# Patient Record
Sex: FEMALE | Race: WHITE | NOT HISPANIC OR LATINO | Employment: OTHER | URBAN - METROPOLITAN AREA
[De-identification: names, ages, dates, MRNs, and addresses within clinical notes are randomized per-mention and may not be internally consistent; named-entity substitution may affect disease eponyms.]

---

## 2018-06-22 ENCOUNTER — HOSPITAL ENCOUNTER (EMERGENCY)
Facility: CLINIC | Age: 83
Discharge: HOME | End: 2018-06-22
Attending: EMERGENCY MEDICINE | Admitting: EMERGENCY MEDICINE
Payer: COMMERCIAL

## 2018-06-22 VITALS
HEIGHT: 65 IN | BODY MASS INDEX: 30.66 KG/M2 | OXYGEN SATURATION: 96 % | WEIGHT: 184 LBS | HEART RATE: 84 BPM | SYSTOLIC BLOOD PRESSURE: 136 MMHG | DIASTOLIC BLOOD PRESSURE: 83 MMHG | RESPIRATION RATE: 16 BRPM | TEMPERATURE: 97.6 F

## 2018-06-22 DIAGNOSIS — S81.001A OPEN KNEE WOUND, RIGHT, INITIAL ENCOUNTER: ICD-10-CM

## 2018-06-22 LAB
BASOPHILS # BLD AUTO: 0.1 10E9/L (ref 0–0.2)
BASOPHILS NFR BLD AUTO: 0.5 %
DIFFERENTIAL METHOD BLD: NORMAL
EOSINOPHIL # BLD AUTO: 0.3 10E9/L (ref 0–0.7)
EOSINOPHIL NFR BLD AUTO: 3 %
ERYTHROCYTE [DISTWIDTH] IN BLOOD BY AUTOMATED COUNT: 14 % (ref 10–15)
HCT VFR BLD AUTO: 40.4 % (ref 35–47)
HGB BLD-MCNC: 13.2 G/DL (ref 11.7–15.7)
IMM GRANULOCYTES # BLD: 0 10E9/L (ref 0–0.4)
IMM GRANULOCYTES NFR BLD: 0.4 %
LYMPHOCYTES # BLD AUTO: 1.6 10E9/L (ref 0.8–5.3)
LYMPHOCYTES NFR BLD AUTO: 17.2 %
MCH RBC QN AUTO: 29.6 PG (ref 26.5–33)
MCHC RBC AUTO-ENTMCNC: 32.7 G/DL (ref 31.5–36.5)
MCV RBC AUTO: 91 FL (ref 78–100)
MONOCYTES # BLD AUTO: 0.5 10E9/L (ref 0–1.3)
MONOCYTES NFR BLD AUTO: 5.8 %
NEUTROPHILS # BLD AUTO: 6.8 10E9/L (ref 1.6–8.3)
NEUTROPHILS NFR BLD AUTO: 73.1 %
NRBC # BLD AUTO: 0 10*3/UL
NRBC BLD AUTO-RTO: 0 /100
PLATELET # BLD AUTO: 274 10E9/L (ref 150–450)
RBC # BLD AUTO: 4.46 10E12/L (ref 3.8–5.2)
WBC # BLD AUTO: 9.4 10E9/L (ref 4–11)

## 2018-06-22 PROCEDURE — 85025 COMPLETE CBC W/AUTO DIFF WBC: CPT | Performed by: EMERGENCY MEDICINE

## 2018-06-22 PROCEDURE — 99283 EMERGENCY DEPT VISIT LOW MDM: CPT | Mod: 25

## 2018-06-22 PROCEDURE — 11042 DBRDMT SUBQ TIS 1ST 20SQCM/<: CPT

## 2018-06-22 ASSESSMENT — ENCOUNTER SYMPTOMS
WOUND: 1
FEVER: 0
MYALGIAS: 0
NUMBNESS: 0
FATIGUE: 1
CHILLS: 0
JOINT SWELLING: 1

## 2018-06-22 NOTE — ED PROVIDER NOTES
History     Chief Complaint:  Wound Check     HPI   Floridalma Cunningham is a 83 year old female, with a history of hypertension, who presents with her daughter to the ED for evaluation of right knee wound check. The patient reports she fell onto vinyl floors while wearing [place] jeans 9 days ago. She was initially evaluated and had a x-ray done. The wound was bandaged. The patient notes she was advised to visit her PCP, and she went 2-3 times with the most recent visit yesterday. She was switched to Clindamycin 150mg TID. She was advised to be reevaluated for worsening redness and swelling, prompting her visit to the ED today. The patient report she has felt more fatigued and is sleeping more. She normally ambulates with a walker. The patient denies any fever, chills, calf pain, or new numbness/tingling. She otherwise feels well and is able to ambulate on the affected leg. No significant pain on movement of the right knee.     Right XR Knee 3 Views, 6/13/2018  FINDINGS/IMPRESSION:  1.  No fracture or malalignment.  2.  Moderate degenerative changes of the medial and lateral compartments as well as the patellofemoral joint. There may be a small joint effusion.  Ty Ulloa MD      Allergies:  Sulfa drugs      Medications:    Atorvastatin  Hydrochlorothiazide   Lisinopril      Past Medical History:    HTN    Past Surgical History:    History reviewed. No pertinent surgical history.    Family History:    History reviewed. No pertinent family history.     Social History:  Smoking status: Never smoker   Alcohol use: No  Presents to ED with daughter    Marital Status:   [5]     Review of Systems   Constitutional: Positive for fatigue. Negative for chills and fever.   Musculoskeletal: Positive for joint swelling. Negative for myalgias.   Skin: Positive for rash and wound.   Neurological: Negative for numbness.   All other systems reviewed and are negative.    Physical Exam     Patient Vitals for the past 24  "hrs:   BP Temp Temp src Pulse Resp SpO2 Height Weight   06/22/18 1711 - - - - - 96 % - -   06/22/18 1706 136/83 - - - - 96 % - -   06/22/18 1422 148/83 97.6  F (36.4  C) Temporal 84 16 95 % 1.651 m (5' 5\") 83.5 kg (184 lb)     Physical Exam  General: Well appearing, nontoxic. Resting comfortably  Head:  Scalp, face, and head appear normal  Eyes:  Pupils are equal, round, and reactive to light    Conjunctivae non-injected and sclerae white  ENT:    The external nose is normal    Pinnae are normal    The oropharynx is normal, mucous membranes moist    Uvula is in the midline  Neck:  Normal range of motion    There is no rigidity noted    Trachea is in the midline  CV:  Regular rate and rhythm     Normal S1/S2, no S3/S4    No murmur or rub  Resp:  Lungs are clear and equal bilaterally    There is no tachypnea    No increased work of breathing    No rales, wheezing, or rhonchi  GI:  Abdomen is soft, no rigidity or guarding    No distension    No tenderness or rebound tenderness   MS:  Normal muscular tone. Ecchymosis left medial knee    Right knee with large 7x5 open soft tissue avulsion wound with central hematoma. The lateral aspect of the wound covered by devitalized superficial skin flap with a small area of trapped serous fluid at the inferior aspect. No active bleeding. Minimal surrounding erythema. No induration or significant warmth.     Symmetric motor strength    No lower extremity edema  Skin:  Wound as noted above  Neuro:  Awake and alert    Speech is normal and fluent    Moves all extremities spontaneously  Psych: Normal affect.  Appropriate interactions.             Emergency Department Course     Laboratory:  CBC: o/w WNL (WBC 9.4, HGB 13.2, )    Procedures:     Wound Care     PERFORMED BY: Marco Trejo MD    LOCATION: Right knee     FUNCTION:  Distally sensation, circulation, motor, and tendon are intact.    ANESTHESIA: None    NOTE: the devitalized superficial tissue/skin flap that was " predominantly overlying the lateral aspect of the wound was debrided with blunt and sharp dissection. Patient tolerated the procedure well with no immediate complications.    Emergency Department Course:  Past medical records, nursing notes, and vitals reviewed.  1603: I performed an exam of the patient and obtained history, as documented above.    1632: I performed the wound care as noted above.    1734: I rechecked the patient.    Findings and plan explained to the Patient and daughter. Patient discharged home with instructions regarding supportive care, medications, and reasons to return. The importance of close follow-up was reviewed.     Impression & Plan      Medical Decision Making:  Floridalma Cunningham is a 83 year old female who presents for evaluation of an open wound on the right knee that she sustained after falling. This is consistent with soft tissue avulsion wound. Patient started on clindamycin 150mg TID by her PCP. There is mild surrounding erythema but no evidence to suggest significant soft tissue infection, cellulitis, or necrotizing infection. There was devitalized tissue overlying a portion of the wound which was debrided as noted above. Patient was full active and passive ROM of the right knee without significant pain making septic arthritis very unlikely. In addition patient as no systemic signs or symptoms of infection including fever, malaise, body aches or other symptoms. CBC including WBC entirely within normal limits. Based on appearance and risk for poor healing, will have them see wound specialist for reevaluation. Mepilex dressing with gauze and kerlix applied to the wound. Dressing supplies given for home.  Patient is already prescribed Clindamycin by her primary care physician and should continue this.  No signs of lymphadenitis, lymphangitis, necrotizing fascitis, osteomyelitis, abscess, cellulitis, etc. The area of erythema was outlined with skin marker. Very close return precautions  provided including worsening erythema, fever, malaise, joint pain or any other worsening of her condition which should prompt immediate return to the ED. Follow up at the wound clinic and PCP recommended. Patient discharged in stable condition.     Diagnosis:    ICD-10-CM   1. Open knee wound, right, initial encounter S81.001A     Disposition: Patient discharged to home with daughter     Trang Souza  6/22/2018    EMERGENCY DEPARTMENT    Scribe Disclosure:  I, Trang Souza, am serving as a scribe at 4:03 PM on 6/22/2018 to document services personally performed by Marco Trejo MD based on my observations and the provider's statements to me.        Marco Trejo MD  06/23/18 0103

## 2018-06-22 NOTE — DISCHARGE INSTRUCTIONS
Keep the dressing on for 2-3 days before changing it. Change it sooner if it comes soiled or soaked through. Follow up with the [facility]. Call today to schedule an appointment. Continue taking the Clindamycin as prescribed. Return immediately for any worsening fevers, spreading redness, lethargy, malaise, or worsening of your condition.    Skin Tear (Skin Avulsion)  A skin avulsion is a tearing of the top layer of skin. This commonly happens after a fall or other injury. It also tends to be more common in older people, or those taking blood thinners or steroids for long periods of time.  Home care  These guidelines will help you care for your wound at home:    Keep the wound clean and dry for the first 24 to 48 hours, or as your healthcare provider advises.    If there is a dressing or bandage, change it when it gets wet or dirty. Otherwise, leave it on for the first 24 hours, then change it once a day or as often as the doctor says.    If stitches or staples were used, check the wound every day.    After taking off the dressing, wash the area gently with soap and water. Clean as close to the stitches as you can. Avoid washing or rubbing the stitches directly.    After 3 days you can keep the bandages off the wound, unless told otherwise, or there is continued drainage.  Allow the wound to be open to the air.    Keep a thin layer of antibiotic ointment on the cut. This will keep the wound clean, make it easier to remove the stitches, and reduce scarring.    If your wound is oozing, you can put a nonstick dressing over it. Then, reapply the bandage or dressing as you were told.    You can shower as usual after the first 24 hours, but don't soak the area in water (no baths or swimming) until the stitches or staples are taken out.    If surgical tape was used, keep the area clean and dry. If it becomes wet, blot it dry with a clean towel.    If skin glue was used, don't put any creams, lotions, or  antibiotic ointments on it. These can dissolve the glue. Usually the glue will flake off in about 5 to 10 days by itself. Try to resist picking it off before that so the wound doesn't open up. When it gets wet, pat it dry.  Here is some information about medicine:    You may use over-the-counter medicine such as acetaminophen or ibuprofen to control pain, unless another pain medicine was given. If you have chronic liver or kidney disease or ever had a stomach ulcer or gastrointestinal bleeding, talk with your doctor before using these medicines.    If you were given antibiotics, take them until they are all used up. It is important to finish the antibiotics even if the wound looks better. This will ensure that the infection has cleared.  Follow-up care  Follow up with your healthcare provider, or as advised.    Watch for any signs of infection, such as increasing redness, swelling, or pus coming out. If this happens, don't wait for your scheduled visit. Instead, see a doctor sooner.    Stitches or staples are usually taken out within 5 to 14 days. This varies depending on what part of your body they are on, and the type of wound. The doctor will tell you how long stitches should be left in.     If surgical tape was used, it is usually left on for 7 to 10 days. You can remove surgical tape after that unless you were told otherwise. If you try to remove it, and it is too hard, soaking can help. Surgical tape strips will eventually fall off on their own. If the edges of the cut pull apart, stop removing the tape or strips and follow up with your doctor    As mentioned above, skin glue will flake off by itself in 5 to 10 days, so you don't need to pull it off.  If any X-rays were done, you will be notified of any changes that may affect your care.  When to seek medical advice  Call your healthcare provider right away if any of these occur:    Increasing pain in the wound    Redness, swelling, or pus coming from the  wound    Fever of 100.4 F (38 C) or higher, or as directed by your healthcare provider    Sutures or staples come apart or fall out before your next appointment and the wound edges look as if they will re-open    Surgical tape closures fall off before 7 days, and the wound edges look as if they will re-open    Bleeding not controlled by direct pressure  Date Last Reviewed: 9/1/2016 2000-2017 [employer], [employer]. [address] [place] [address], [place] [address]. All rights reserved. This information is not intended as a substitute for professional medical care. Always follow your healthcare professional's instructions.

## 2018-06-22 NOTE — ED AVS SNAPSHOT
Emergency Department    [address] [place] [id number]    Phone:  [phone]    Fax:  [phone]                                       Floridalma Cunningham   MRN: [id number]    Department:   Emergency Department   Date of Visit:  6/22/2018           After Visit Summary Signature Page     I have received my discharge instructions, and my questions have been answered. I have discussed any challenges I see with this plan with the nurse or doctor.    ..........................................................................................................................................  Patient/Patient Representative Signature      ..........................................................................................................................................  Patient Representative Print Name and Relationship to Patient    ..................................................               ................................................  Date                                            Time    ..........................................................................................................................................  Reviewed by Signature/Title    ...................................................              ..............................................  Date                                                            Time

## 2018-06-22 NOTE — ED AVS SNAPSHOT
Emergency Department    [address] [facility] [address]    Phone:  [phone]    Fax:  [phone]                                       Floridalma Cunningham   MRN: [id number]    Department:   Emergency Department   Date of Visit:  6/22/2018           Patient Information     Date Of Birth          1935        Your diagnoses for this visit were:     Open knee wound, right, initial encounter        You were seen by Marco Trejo MD.      Follow-up Information     Follow up with [facility]. Schedule an appointment as soon as possible for a visit in 2 days.    Why:  For close follow up of your wound    Contact information:    [facility]  [address], [address]  [place] [address] [phone]        Go to  Emergency Department.    Specialty:  EMERGENCY MEDICINE    Why:  If symptoms worsen    Contact information:    [address] [facility] [address] [phone]        Follow up with Naz Peacock MD. Schedule an appointment as soon as possible for a visit in 3 days.    Why:  For wound re-check    Contact information:    [employer]  [address] [facility] [id number] [phone]          Discharge Instructions         Keep the dressing on for 2-3 days before changing it. Change it sooner if it comes soiled or soaked through. Follow up with the [facility]. Call today to schedule an appointment. Continue taking the Clindamycin as prescribed. Return immediately for any worsening fevers, spreading redness, lethargy, malaise, or worsening of your condition.    Skin Tear (Skin Avulsion)  A skin avulsion is a tearing of the top layer of skin. This commonly happens after a fall or other injury. It also tends to be more common in older people, or those taking blood thinners or steroids for long periods of time.  Home care  These guidelines will help you care for your wound at home:    Keep the wound clean and dry  for the first 24 to 48 hours, or as your healthcare provider advises.    If there is a dressing or bandage, change it when it gets wet or dirty. Otherwise, leave it on for the first 24 hours, then change it once a day or as often as the doctor says.    If stitches or staples were used, check the wound every day.    After taking off the dressing, wash the area gently with soap and water. Clean as close to the stitches as you can. Avoid washing or rubbing the stitches directly.    After 3 days you can keep the bandages off the wound, unless told otherwise, or there is continued drainage.  Allow the wound to be open to the air.    Keep a thin layer of antibiotic ointment on the cut. This will keep the wound clean, make it easier to remove the stitches, and reduce scarring.    If your wound is oozing, you can put a nonstick dressing over it. Then, reapply the bandage or dressing as you were told.    You can shower as usual after the first 24 hours, but don't soak the area in water (no baths or swimming) until the stitches or staples are taken out.    If surgical tape was used, keep the area clean and dry. If it becomes wet, blot it dry with a clean towel.    If skin glue was used, don't put any creams, lotions, or antibiotic ointments on it. These can dissolve the glue. Usually the glue will flake off in about 5 to 10 days by itself. Try to resist picking it off before that so the wound doesn't open up. When it gets wet, pat it dry.  Here is some information about medicine:    You may use over-the-counter medicine such as acetaminophen or ibuprofen to control pain, unless another pain medicine was given. If you have chronic liver or kidney disease or ever had a stomach ulcer or gastrointestinal bleeding, talk with your doctor before using these medicines.    If you were given antibiotics, take them until they are all used up. It is important to finish the antibiotics even if the wound looks better. This will ensure that  the infection has cleared.  Follow-up care  Follow up with your healthcare provider, or as advised.    Watch for any signs of infection, such as increasing redness, swelling, or pus coming out. If this happens, don't wait for your scheduled visit. Instead, see a doctor sooner.    Stitches or staples are usually taken out within 5 to 14 days. This varies depending on what part of your body they are on, and the type of wound. The doctor will tell you how long stitches should be left in.     If surgical tape was used, it is usually left on for 7 to 10 days. You can remove surgical tape after that unless you were told otherwise. If you try to remove it, and it is too hard, soaking can help. Surgical tape strips will eventually fall off on their own. If the edges of the cut pull apart, stop removing the tape or strips and follow up with your doctor    As mentioned above, skin glue will flake off by itself in 5 to 10 days, so you don't need to pull it off.  If any X-rays were done, you will be notified of any changes that may affect your care.  When to seek medical advice  Call your healthcare provider right away if any of these occur:    Increasing pain in the wound    Redness, swelling, or pus coming from the wound    Fever of 100.4 F (38 C) or higher, or as directed by your healthcare provider    Sutures or staples come apart or fall out before your next appointment and the wound edges look as if they will re-open    Surgical tape closures fall off before 7 days, and the wound edges look as if they will re-open    Bleeding not controlled by direct pressure  Date Last Reviewed: 9/1/2016 2000-2017 [employer]. [address] [place] [address]. All rights reserved. This information is not intended as a substitute for professional medical care. Always follow your healthcare professional's instructions.          24 Hour Appointment Hotline       To make an appointment at any [facility], call  [phone] ([phone]). If you don't have a family doctor or clinic, we will help you find one. [facility] clinics are conveniently located to serve the needs of you and your family.             Review of your medicines      Our records show that you are taking the medicines listed below. If these are incorrect, please call your family doctor or clinic.        Dose / Directions Last dose taken    ATORVASTATIN CALCIUM PO        Refills:  0        HYDROCHLOROTHIAZIDE PO        Refills:  0        LISINOPRIL PO        Refills:  0                Procedures and tests performed during your visit     CBC with platelets differential      Orders Needing Specimen Collection     None      Pending Results     No orders found from 6/20/2018 to 6/23/2018.            Pending Culture Results     No orders found from 6/20/2018 to 6/23/2018.            Pending Results Instructions     If you had any lab results that were not finalized at the time of your Discharge, you can call the ED Lab Result RN at [phone]. You will be contacted by this team for any positive Lab results or changes in treatment. The nurses are available 7 days a week from 10A to 6:30P.  You can leave a message 24 hours per day and they will return your call.        Test Results From Your Hospital Stay        6/22/2018  5:04 PM      Component Results     Component Value Ref Range & Units Status    WBC 9.4 4.0 - 11.0 10e9/L Final    RBC Count 4.46 3.8 - 5.2 10e12/L Final    Hemoglobin 13.2 11.7 - 15.7 g/dL Final    Hematocrit 40.4 35.0 - 47.0 % Final    MCV 91 78 - 100 fl Final    MCH 29.6 26.5 - 33.0 pg Final    MCHC 32.7 31.5 - 36.5 g/dL Final    RDW 14.0 10.0 - 15.0 % Final    Platelet Count 274 150 - 450 10e9/L Final    Diff Method Automated Method  Final    % Neutrophils 73.1 % Final    % Lymphocytes 17.2 % Final    % Monocytes 5.8 % Final    % Eosinophils 3.0 % Final    % Basophils 0.5 % Final    % Immature Granulocytes 0.4 % Final    Nucleated RBCs  0 0 /100 Final    Absolute Neutrophil 6.8 1.6 - 8.3 10e9/L Final    Absolute Lymphocytes 1.6 0.8 - 5.3 10e9/L Final    Absolute Monocytes 0.5 0.0 - 1.3 10e9/L Final    Absolute Eosinophils 0.3 0.0 - 0.7 10e9/L Final    Absolute Basophils 0.1 0.0 - 0.2 10e9/L Final    Abs Immature Granulocytes 0.0 0 - 0.4 10e9/L Final    Absolute Nucleated RBC 0.0  Final                Clinical Quality Measure: Blood Pressure Screening     Your blood pressure was checked while you were in the emergency department today. The last reading we obtained was  BP: 136/83 . Please read the guidelines below about what these numbers mean and what you should do about them.  If your systolic blood pressure (the top number) is less than 120 and your diastolic blood pressure (the bottom number) is less than 80, then your blood pressure is normal. There is nothing more that you need to do about it.  If your systolic blood pressure (the top number) is 120-139 or your diastolic blood pressure (the bottom number) is 80-89, your blood pressure may be higher than it should be. You should have your blood pressure rechecked within a year by a primary care provider.  If your systolic blood pressure (the top number) is 140 or greater or your diastolic blood pressure (the bottom number) is 90 or greater, you may have high blood pressure. High blood pressure is treatable, but if left untreated over time it can put you at risk for heart attack, stroke, or kidney failure. You should have your blood pressure rechecked by a primary care provider within the next 4 weeks.  If your provider in the emergency department today gave you specific instructions to follow-up with your doctor or provider even sooner than that, you should follow that instruction and not wait for up to 4 weeks for your follow-up visit.        Thank you for choosing [facility]       Thank you for choosing [facility] for your care. Our goal is always to provide you with excellent care. Hearing back  "from our patients is one way we can continue to improve our services. Please take a few minutes to complete the written survey that you may receive in the mail after you visit with us. Thank you!        [employer] Information     [employer] lets you send messages to your doctor, view your test results, renew your prescriptions, schedule appointments and more. To sign up, go to [email or web address] . Click on \"Log in\" on the left side of the screen, which will take you to the Welcome page. Then click on \"Sign up Now\" on the right side of the page.     You will be asked to enter the access code listed below, as well as some personal information. Please follow the directions to create your username and password.     Your access code is: [email or web address]  Expires: 2018  5:36 PM     Your access code will  in 90 days. If you need help or a new code, please call your [facility] clinic or [phone].        Care EveryWhere ID     This is your Care EveryWhere ID. This could be used by other organizations to access your [facility] medical records  [id number]        Equal Access to Services     ASIM GOMEZ : Hadwallace Paige, stephany frazier, maria elena barrow, alena ford . So [facility] [phone].    ATENCIÓN: Si habla español, tiene a babin disposición servicios gratuitos de asistencia lingüística. Colleen al [phone].    We comply with applicable federal civil rights laws and [place] laws. We do not discriminate on the basis of race, color, national origin, age, disability, sex, sexual orientation, or gender identity.            After Visit Summary       This is your record. Keep this with you and show to your community pharmacist(s) and doctor(s) at your next visit.                  "

## 2018-08-01 ENCOUNTER — HOSPITAL ENCOUNTER (OUTPATIENT)
Dept: WOUND CARE | Facility: CLINIC | Age: 83
Discharge: HOME | End: 2018-08-01
Attending: PHYSICIAN ASSISTANT | Admitting: PHYSICIAN ASSISTANT
Payer: COMMERCIAL

## 2018-08-01 VITALS
DIASTOLIC BLOOD PRESSURE: 71 MMHG | SYSTOLIC BLOOD PRESSURE: 139 MMHG | BODY MASS INDEX: 30.67 KG/M2 | HEART RATE: 94 BPM | RESPIRATION RATE: 18 BRPM | HEIGHT: 65 IN | TEMPERATURE: 96.8 F | WEIGHT: 184.08 LBS

## 2018-08-01 DIAGNOSIS — S81.801D TRAUMATIC OPEN WOUND OF RIGHT LOWER LEG, SUBSEQUENT ENCOUNTER: ICD-10-CM

## 2018-08-01 PROBLEM — S81.801A TRAUMATIC OPEN WOUND OF RIGHT LOWER LEG: Status: ACTIVE | Noted: 2018-08-01

## 2018-08-01 PROCEDURE — 11042 DBRDMT SUBQ TIS 1ST 20SQCM/<: CPT

## 2018-08-01 PROCEDURE — G0463 HOSPITAL OUTPT CLINIC VISIT: HCPCS | Mod: 25

## 2018-08-01 PROCEDURE — A6212 FOAM DRG <=16 SQ IN W/BORDER: HCPCS

## 2018-08-01 PROCEDURE — 11042 DBRDMT SUBQ TIS 1ST 20SQCM/<: CPT | Performed by: PHYSICIAN ASSISTANT

## 2018-08-01 PROCEDURE — A6248 HYDROGEL DRSG GEL FILLER: HCPCS

## 2018-08-01 PROCEDURE — 99203 OFFICE O/P NEW LOW 30 MIN: CPT | Mod: 25 | Performed by: PHYSICIAN ASSISTANT

## 2018-08-01 NOTE — DISCHARGE INSTRUCTIONS
[facility]  [address] [facility] [address] [address]  Appointment Phone [phone] Nurse Advisors [phone]    Floridalma Cunningham      1935    Wound Dressing Change to right knee  Cleanse wound with:soap and water, may shower  Cover wound with Woun'dres gel   Cover wound with Telfa pad  Change dressing daily    Keep wound covered at all times, do not let it air out. New skin cells can not grow in a dry environment.    A diet high in protein is important for wound healing, we recommend getting 60 grams of protein per day. Taking protein shakes or bars are a good way to get extra protein in your diet.      Kell Pulido PA-C. August 1, 2018    Call us at [phone] if you have any questions about your wounds, have redness or swelling around your wound, have a fever of 101 or greater or if you have any other problems or concerns. We answer the phone Monday through Friday 8 am to 4 pm, please leave a message as we check the voicemail frequently throughout the day.     Follow up with Camila in 2-3 weeks

## 2018-08-01 NOTE — PROGRESS NOTES
Patient arrived for wound care visit. Certified Wound Care Nurse time spent evaluating patient record, completed a full evaluation and documented wound(s) & jonathan-wound skin; provided recommendation based on treatment plan. Applied dressing, reviewed discharge instructions, patient education, and discussed plan of care with appropriate medical team staff members and patient and/or family members.

## 2018-08-01 NOTE — MR AVS SNAPSHOT
MRN:[id number]                      After Visit Summary   8/1/2018    Floridalma Cunningham    MRN: [id number]           Visit Information        Provider Department      8/1/2018  2:00 PM Kell Pulido PA-C [facility]          Further instructions from your care team             [facility]  [address] [facility] [address], [place] [address]  Appointment Phone [phone] Nurse Advisors [phone]    Floridalma Cunningham      1935    Wound Dressing Change to right knee  Cleanse wound with:soap and water, may shower  Cover wound with Woun'dres gel   Cover wound with Telfa pad  Change dressing daily    Keep wound covered at all times, do not let it air out. New skin cells can not grow in a dry environment.    A diet high in protein is important for wound healing, we recommend getting 60 grams of protein per day. Taking protein shakes or bars are a good way to get extra protein in your diet.      Kell Pulido PA-C. August 1, 2018    Call us at [phone] if you have any questions about your wounds, have redness or swelling around your wound, have a fever of 101 or greater or if you have any other problems or concerns. We answer the phone Monday through Friday 8 am to 4 pm, please leave a message as we check the voicemail frequently throughout the day.     Follow up with Camila in 2-3 weeks          Care EveryWhere ID     This is your Care EveryWhere ID. This could be used by other organizations to access your [facility] medical records  [id number]        Equal Access to Services     ASIM GOMEZ : Hadii bernie jiango Solani, waaxda luqadaha, qaybta kaalmada pushpa, alena idideon sanders. So [facility] [phone].    ATENCIÓN: Si habla español, tiene a babin disposición servicios gratuitos de asistencia lingüística. Llame al [phone].    We comply with applicable federal civil rights laws and [place]  laws. We do not discriminate on the basis of race, color, national origin, age, disability, sex, sexual orientation, or gender identity.

## 2018-08-01 NOTE — PROGRESS NOTES
"[facility]    HISTORY OF PRESENT ILLNESS: Floridalma Cunningham is a 83 year old female who presents today for a right knee wound. She fell back in June onto hard arline. Fortunately there was no bony involvement. She has been on several rounds of antibiotics and is frustrated with her slow healing. She has decreased her activity dramatically to avoid movement of the knee joint.     DATE WOUND ACQUIRED: 6/2018    PAST MEDICAL HISTORY:  has a past medical history of Hypertension.    MEDICATIONS:   Current Outpatient Prescriptions   Medication     ATORVASTATIN CALCIUM PO     HYDROCHLOROTHIAZIDE PO     LISINOPRIL PO     No current facility-administered medications for this encounter.      REVIEW OF SYSTEMS:  CONSTITUTIONAL: Denies fevers or acute illness  CARDIOVASCULAR: Denies circulatory issues or previous vascular procedures  ENDOCRINE: Denies diabetes    VITALS: /71  Pulse 94  Temp 96.8  F (36  C) (Temporal)  Resp 18  Ht 1.651 m (5' 5\")  Wt 83.5 kg (184 lb 1.4 oz)  BMI 30.63 kg/m2    PHYSICAL EXAM:  GENERAL: Patient is alert and oriented and in no acute distress  INTEGUMENTARY:   WOUND ASSESSMENT #1:     Location: right knee     Size: 3.0 cm x 2.5 cm with a depth of 0.3 cm    Drainage: copious amount of serosanguinous drainage    Wound description: focal pockets of hematoma and slough        PROCEDURE: 4% topical lidocaine was applied to the wound by the [facility]. Patient was determined to be capable of making their own medical decisions and informed consent was obtained. Using a sharp curette a surgical debridement was performed down to and including subcutaneous tissue of <20 cm. Hemostasis was achieved with pressure. The patient tolerated the procedure well.      ASSESSMENT: full-thickness, traumatic, right knee ulcer with fat-layer exposed    PLAN:   1. Primary dressing: WounDres gel; Secondary dressing: Telfa Pad    FOLLOW-UP: 2-3 weeks    IRIS HERRERA PA-C ([facility])    "

## 2018-08-14 NOTE — ADDENDUM NOTE
Encounter addended by: Kell Pulido PA-C on: 8/14/2018 10:52 AM<BR>     Actions taken: Pend clinical note, Sign clinical note

## 2018-08-15 ENCOUNTER — HOSPITAL ENCOUNTER (OUTPATIENT)
Dept: WOUND CARE | Facility: CLINIC | Age: 83
Discharge: HOME | End: 2018-08-15
Attending: PHYSICIAN ASSISTANT | Admitting: PHYSICIAN ASSISTANT
Payer: COMMERCIAL

## 2018-08-15 VITALS
HEART RATE: 95 BPM | SYSTOLIC BLOOD PRESSURE: 135 MMHG | RESPIRATION RATE: 18 BRPM | TEMPERATURE: 98.1 F | DIASTOLIC BLOOD PRESSURE: 78 MMHG

## 2018-08-15 DIAGNOSIS — S81.801D TRAUMATIC OPEN WOUND OF RIGHT LOWER LEG, SUBSEQUENT ENCOUNTER: ICD-10-CM

## 2018-08-15 PROCEDURE — 97597 DBRDMT OPN WND 1ST 20 CM/<: CPT

## 2018-08-15 PROCEDURE — 97597 DBRDMT OPN WND 1ST 20 CM/<: CPT | Performed by: PHYSICIAN ASSISTANT

## 2018-08-15 PROCEDURE — 97598 DBRDMT OPN WND ADDL 20CM/<: CPT

## 2018-08-15 NOTE — DISCHARGE INSTRUCTIONS
[facility]  [address] [facility] [address] [address]  Appointment Phone [phone] Nurse Advisors [phone]     Floridalma Cunningham      1935     Wound Dressing Change to right knee  Cleanse wound with:soap and water, may shower  Cover wound with Woun'dres gel   Cover wound with BANDAID  Change dressing daily     Keep wound covered at all times, do not let it air out. New skin cells can not grow in a dry environment.     A diet high in protein is important for wound healing, we recommend getting 60 grams of protein per day. Taking protein shakes or bars are a good way to get extra protein in your diet.       Kell Pulido PA-C. August 15, 2018     Call us at [phone] if you have any questions about your wounds, have redness or swelling around your wound, have a fever of 101 or greater or if you have any other problems or concerns. We answer the phone Monday through Friday 8 am to 4 pm, please leave a message as we check the voicemail frequently throughout the day.      Follow up with Camila if needed

## 2018-08-15 NOTE — MR AVS SNAPSHOT
MRN:[id number]                      After Visit Summary   8/15/2018    Floridalma Cunningham    MRN: [id number]           Visit Information        Provider Department      8/15/2018  2:15 PM Kell Pulido PA-C [facility]          Further instructions from your care team             [facility]  [address] [facility] [address], [place] [address]  Appointment Phone [phone] Nurse Advisors [phone]     Floriadlma Cunningham      1935     Wound Dressing Change to right knee  Cleanse wound with:soap and water, may shower  Cover wound with Woun'dres gel   Cover wound with BANDAID  Change dressing daily     Keep wound covered at all times, do not let it air out. New skin cells can not grow in a dry environment.     A diet high in protein is important for wound healing, we recommend getting 60 grams of protein per day. Taking protein shakes or bars are a good way to get extra protein in your diet.       Kell Pulido PA-C. August 15, 2018     Call us at [phone] if you have any questions about your wounds, have redness or swelling around your wound, have a fever of 101 or greater or if you have any other problems or concerns. We answer the phone Monday through Friday 8 am to 4 pm, please leave a message as we check the voicemail frequently throughout the day.      Follow up with Camila if needed       Care EveryWhere ID     This is your Care EveryWhere ID. This could be used by other organizations to access your [facility] medical records  [id number]        Equal Access to Services     [facility] PATRICIA : Hadii bernie resendez hadasho Sosmithali, waaxda luqadaha, qaybta kaalmada adeegyada, alena sanders. So [facility] [phone].    ATENCIÓN: Si habla español, tiene a babin disposición servicios gratuitos de asistencia lingüística. Llame al [phone].    We comply with applicable federal civil rights laws and  [place] laws. We do not discriminate on the basis of race, color, national origin, age, disability, sex, sexual orientation, or gender identity.

## 2018-08-22 NOTE — PROGRESS NOTES
[facility]    HISTORY OF PRESENT ILLNESS: Floridalma Cunningham is a 83 year old female who presents today for a right knee wound. She fell back in June onto hard arline. Fortunately there was no bony involvement. She has been on several rounds of antibiotics and is frustrated with her slow healing. She has decreased her activity dramatically to avoid movement of the knee joint.     She has greatly improved since our last visit and is nearly healed.     WOUND CARE: WounDres    DATE WOUND ACQUIRED: 6/2018    PAST MEDICAL HISTORY:  has a past medical history of Hypertension.    MEDICATIONS:   Current Outpatient Prescriptions   Medication     ATORVASTATIN CALCIUM PO     HYDROCHLOROTHIAZIDE PO     LISINOPRIL PO     No current facility-administered medications for this encounter.      REVIEW OF SYSTEMS:  CONSTITUTIONAL: Denies fevers or acute illness  CARDIOVASCULAR: Denies circulatory issues or previous vascular procedures  ENDOCRINE: Denies diabetes    VITALS: /78  Pulse 95  Temp 98.1  F (36.7  C) (Temporal)  Resp 18    PHYSICAL EXAM:  GENERAL: Patient is alert and oriented and in no acute distress  INTEGUMENTARY:   WOUND ASSESSMENT #1:     Location: right knee     Size: 0.2 cm x 0.7 cm with a depth of 0.1 cm    Drainage: copious amount of serosanguinous drainage    Wound description: focal pocket of hematoma and slough        PROCEDURE: 4% topical lidocaine was applied to the wound by the CMA. Patient was determined to be capable of making their own medical decisions and informed consent was obtained. Using a sharp curette a selective debridement of non-viable tissue was performed of <20 cm. Hemostasis was achieved with pressure. The patient tolerated the procedure well.      ASSESSMENT: full-thickness, traumatic, right knee ulcer with fat-layer exposed    PLAN:   1. Primary dressing: WounDres gel; Secondary dressing: Telfa Pad    FOLLOW-UP: 2-3 weeks    IRIS HERRERA PA-C ([facility])

## 2018-08-22 NOTE — ADDENDUM NOTE
Encounter addended by: Kell Pulido PA-C on: 8/22/2018 12:59 PM<BR>     Actions taken: Pend clinical note

## 2019-04-04 ENCOUNTER — OFFICE VISIT (OUTPATIENT)
Dept: FAMILY MEDICINE | Facility: CLINIC | Age: 84
End: 2019-04-04
Payer: COMMERCIAL

## 2019-04-04 VITALS
HEART RATE: 64 BPM | DIASTOLIC BLOOD PRESSURE: 70 MMHG | WEIGHT: 179 LBS | BODY MASS INDEX: 29.79 KG/M2 | SYSTOLIC BLOOD PRESSURE: 134 MMHG | TEMPERATURE: 98.5 F

## 2019-04-04 DIAGNOSIS — E78.5 HYPERLIPIDEMIA LDL GOAL <130: ICD-10-CM

## 2019-04-04 DIAGNOSIS — M17.10 ARTHRITIS OF KNEE: ICD-10-CM

## 2019-04-04 DIAGNOSIS — I10 ESSENTIAL HYPERTENSION: ICD-10-CM

## 2019-04-04 DIAGNOSIS — Z76.89 ESTABLISHING CARE WITH NEW DOCTOR, ENCOUNTER FOR: ICD-10-CM

## 2019-04-04 DIAGNOSIS — Z78.0 ASYMPTOMATIC POSTMENOPAUSAL STATUS: Primary | ICD-10-CM

## 2019-04-04 PROBLEM — S81.801A TRAUMATIC OPEN WOUND OF RIGHT LOWER LEG: Status: RESOLVED | Noted: 2018-08-01 | Resolved: 2019-04-04

## 2019-04-04 PROCEDURE — 36415 COLL VENOUS BLD VENIPUNCTURE: CPT | Performed by: FAMILY MEDICINE

## 2019-04-04 PROCEDURE — 80061 LIPID PANEL: CPT | Performed by: FAMILY MEDICINE

## 2019-04-04 PROCEDURE — 99203 OFFICE O/P NEW LOW 30 MIN: CPT | Performed by: FAMILY MEDICINE

## 2019-04-04 PROCEDURE — 80053 COMPREHEN METABOLIC PANEL: CPT | Performed by: FAMILY MEDICINE

## 2019-04-04 RX ORDER — OXYMETAZOLINE HCL 0.05 %
AEROSOL, SPRAY WITH PUMP (ML) NASAL
COMMUNITY
Start: 2010-08-12 | End: 2019-04-04

## 2019-04-04 RX ORDER — ATORVASTATIN CALCIUM 10 MG/1
10 TABLET, FILM COATED ORAL DAILY
Qty: 90 TABLET | Refills: 1 | Status: SHIPPED | OUTPATIENT
Start: 2019-04-04 | End: 2019-09-07

## 2019-04-04 RX ORDER — HYDROCHLOROTHIAZIDE 25 MG/1
25 TABLET ORAL DAILY
Qty: 90 TABLET | Refills: 1 | Status: SHIPPED | OUTPATIENT
Start: 2019-04-04 | End: 2020-04-28

## 2019-04-04 RX ORDER — ATORVASTATIN CALCIUM 10 MG/1
10 TABLET, FILM COATED ORAL DAILY
Refills: 1 | COMMUNITY
Start: 2018-12-26 | End: 2019-04-04

## 2019-04-04 RX ORDER — LISINOPRIL 20 MG/1
20 TABLET ORAL DAILY
Qty: 90 TABLET | Refills: 1 | Status: SHIPPED | OUTPATIENT
Start: 2019-04-04 | End: 2020-03-13

## 2019-04-04 RX ORDER — LISINOPRIL 20 MG/1
20 TABLET ORAL
COMMUNITY
Start: 2018-03-28 | End: 2019-04-04

## 2019-04-04 RX ORDER — HYDROCHLOROTHIAZIDE 25 MG/1
25 TABLET ORAL DAILY
Refills: 1 | COMMUNITY
Start: 2018-12-26 | End: 2019-04-04

## 2019-04-04 SDOH — HEALTH STABILITY: MENTAL HEALTH: HOW OFTEN DO YOU HAVE A DRINK CONTAINING ALCOHOL?: MONTHLY OR LESS

## 2019-04-04 NOTE — LETTER
April 8, 2019      Floridalma Cunningham  [address]  [place] [address]        Dear ,    We are writing to inform you of your test results.    Normal lipid panel.   Normal liver function tests, kidney functions, glucose and  electrolytes(various salts in your body)        Resulted Orders   Comprehensive metabolic panel   Result Value Ref Range    Sodium 143 133 - 144 mmol/L    Potassium 4.1 3.4 - 5.3 mmol/L    Chloride 109 94 - 109 mmol/L    Carbon Dioxide 27 20 - 32 mmol/L    Anion Gap 7 3 - 14 mmol/L    Glucose 98 70 - 99 mg/dL      Comment:      Fasting specimen    Urea Nitrogen 13 7 - 30 mg/dL    Creatinine 0.67 0.52 - 1.04 mg/dL    GFR Estimate 81 >60 mL/min/[1.73_m2]      Comment:      Non  GFR Calc  Starting 12/18/2018, serum creatinine based estimated GFR (eGFR) will be   calculated using the Chronic Kidney Disease Epidemiology Collaboration   (CKD-EPI) equation.      GFR Estimate If Black >90 >60 mL/min/[1.73_m2]      Comment:       GFR Calc  Starting 12/18/2018, serum creatinine based estimated GFR (eGFR) will be   calculated using the Chronic Kidney Disease Epidemiology Collaboration   (CKD-EPI) equation.      Calcium 9.7 8.5 - 10.1 mg/dL    Bilirubin Total 0.9 0.2 - 1.3 mg/dL    Albumin 3.8 3.4 - 5.0 g/dL    Protein Total 7.2 6.8 - 8.8 g/dL    Alkaline Phosphatase 86 40 - 150 U/L    ALT 27 0 - 50 U/L    AST 23 0 - 45 U/L   Lipid panel reflex to direct LDL Fasting   Result Value Ref Range    Cholesterol 169 <200 mg/dL    Triglycerides 125 <150 mg/dL      Comment:      Fasting specimen    HDL Cholesterol 51 >49 mg/dL    LDL Cholesterol Calculated 93 <100 mg/dL      Comment:      Desirable:       <100 mg/dl    Non HDL Cholesterol 118 <130 mg/dL       If you have any questions or concerns, please call the clinic at the number listed above.       Sincerely,        Dinah Green MD

## 2019-04-04 NOTE — PROGRESS NOTES
SUBJECTIVE:   Floridalma Cunningham is a 83 year old female who presents to clinic today for the following health issues:      New Patient/Transfer of Care from [facility]      Hyperlipidemia Follow-Up      Rate your low fat/cholesterol diet?: fair    Taking statin?  Yes, no muscle aches from statin  Other lipid medications/supplements?:  Fish oil/Omega 3    Hypertension Follow-up      Outpatient blood pressures are not being checked.    Low Salt Diet: not monitoring salt      Amount of exercise or physical activity: Lives at [place] and it is done there 2 days per week    Problems taking medications regularly: No    Medication side effects: none    Diet: regular (no restrictions)        PROBLEMS TO ADD ON...  Does not recall having bone density test, willing to proceed.Trying to take vit-d and calcium through her multivitamin .      Feels well except has ongoing issue with knee arthritis but seeing orthopedic and she is doing ok.     Problem list and histories reviewed & adjusted, as indicated.  Additional history: as documented    Patient Active Problem List   Diagnosis     Essential hypertension     Hyperlipidemia LDL goal <130     Asymptomatic postmenopausal status     Arthritis of knee     No past surgical history on file.    Social History     Tobacco Use     Smoking status: Never Smoker     Smokeless tobacco: Never Used   Substance Use Topics     Alcohol use: Yes     Frequency: Monthly or less     History reviewed. No pertinent family history.        Reviewed and updated as needed this visit by clinical staff  Tobacco  Allergies  Meds  Fam Hx  Soc Hx      Reviewed and updated as needed this visit by Provider         ROS:  Constitutional, HEENT, cardiovascular, pulmonary, GI, , musculoskeletal, neuro, skin, endocrine and psych systems are negative, except as otherwise noted.    OBJECTIVE:     /70   Pulse 64   Temp 98.5  F (36.9  C) (Tympanic)   Wt 81.2 kg (179 lb)   BMI 29.79 kg/m    Body mass  index is 29.79 kg/m .  GENERAL: healthy, alert and no distress  RESP: lungs clear to auscultation - no rales, rhonchi or wheezes  CV: regular rate and rhythm, normal S1 S2, no S3 or S4, no murmur, click or rub, no peripheral edema and peripheral pulses strong  ABDOMEN: soft, nontender, no hepatosplenomegaly, no masses and bowel sounds normal  MS: no edema        ASSESSMENT/PLAN:       (Z76.89) Establishing care with new doctor, encounter for  Comment:   Plan:     (E78.5) Hyperlipidemia LDL goal <130  Comment: stable   Plan: Comprehensive metabolic panel, Lipid panel         reflex to direct LDL Fasting, atorvastatin         (LIPITOR) 10 MG tablet            (I10) Essential hypertension  Comment: stable   Plan: Comprehensive metabolic panel, lisinopril         (PRINIVIL/ZESTRIL) 20 MG tablet,         hydrochlorothiazide (HYDRODIURIL) 25 MG tablet            (M17.10) Arthritis of knee  Comment: knees mostly, seeing orthopedic   Plan: seeing ortho     (Z78.0) Asymptomatic postmenopausal status  (primary encounter diagnosis)  Comment: she wish to proceed with test as she does not recall doing it previously   Plan: DX Hip/Pelvis/Spine            Check labs. refill sent.Cares and  treatment discussed follow. up if problem   Patient expressed understanding and agreement with treatment plan. All patient's questions were answered, will let me know if has more later.  Medications: Rx's: Reviewed the potential side effects/complications of medications prescribed.       Dinah Green MD  [facility] [place]

## 2019-04-05 LAB
ALBUMIN SERPL-MCNC: 3.8 G/DL (ref 3.4–5)
ALP SERPL-CCNC: 86 U/L (ref 40–150)
ALT SERPL W P-5'-P-CCNC: 27 U/L (ref 0–50)
ANION GAP SERPL CALCULATED.3IONS-SCNC: 7 MMOL/L (ref 3–14)
AST SERPL W P-5'-P-CCNC: 23 U/L (ref 0–45)
BILIRUB SERPL-MCNC: 0.9 MG/DL (ref 0.2–1.3)
BUN SERPL-MCNC: 13 MG/DL (ref 7–30)
CALCIUM SERPL-MCNC: 9.7 MG/DL (ref 8.5–10.1)
CHLORIDE SERPL-SCNC: 109 MMOL/L (ref 94–109)
CHOLEST SERPL-MCNC: 169 MG/DL
CO2 SERPL-SCNC: 27 MMOL/L (ref 20–32)
CREAT SERPL-MCNC: 0.67 MG/DL (ref 0.52–1.04)
GFR SERPL CREATININE-BSD FRML MDRD: 81 ML/MIN/{1.73_M2}
GLUCOSE SERPL-MCNC: 98 MG/DL (ref 70–99)
HDLC SERPL-MCNC: 51 MG/DL
LDLC SERPL CALC-MCNC: 93 MG/DL
NONHDLC SERPL-MCNC: 118 MG/DL
POTASSIUM SERPL-SCNC: 4.1 MMOL/L (ref 3.4–5.3)
PROT SERPL-MCNC: 7.2 G/DL (ref 6.8–8.8)
SODIUM SERPL-SCNC: 143 MMOL/L (ref 133–144)
TRIGL SERPL-MCNC: 125 MG/DL

## 2019-08-14 ENCOUNTER — HOSPITAL ENCOUNTER (OUTPATIENT)
Dept: BONE DENSITY | Facility: CLINIC | Age: 84
Discharge: HOME | End: 2019-08-14
Attending: FAMILY MEDICINE | Admitting: FAMILY MEDICINE
Payer: COMMERCIAL

## 2019-08-14 ENCOUNTER — HOSPITAL ENCOUNTER (OUTPATIENT)
Dept: MAMMOGRAPHY | Facility: CLINIC | Age: 84
End: 2019-08-14
Attending: FAMILY MEDICINE
Payer: COMMERCIAL

## 2019-08-14 DIAGNOSIS — Z12.31 VISIT FOR SCREENING MAMMOGRAM: ICD-10-CM

## 2019-08-14 DIAGNOSIS — Z78.0 ASYMPTOMATIC POSTMENOPAUSAL STATUS: ICD-10-CM

## 2019-08-14 PROCEDURE — 77080 DXA BONE DENSITY AXIAL: CPT

## 2019-08-14 PROCEDURE — 77063 BREAST TOMOSYNTHESIS BI: CPT

## 2019-09-07 DIAGNOSIS — E78.5 HYPERLIPIDEMIA LDL GOAL <130: ICD-10-CM

## 2019-09-09 RX ORDER — ATORVASTATIN CALCIUM 10 MG/1
TABLET, FILM COATED ORAL
Qty: 90 TABLET | Refills: 1 | Status: SHIPPED | OUTPATIENT
Start: 2019-09-09 | End: 2020-03-13

## 2019-09-09 NOTE — TELEPHONE ENCOUNTER
Prescription approved per [facility], [facility] or [facility] refill protocol.  Kelly MARISCAL RN   [facility]

## 2019-09-09 NOTE — TELEPHONE ENCOUNTER
"Requested Prescriptions   Pending Prescriptions Disp Refills     atorvastatin (LIPITOR) 10 MG tablet [Pharmacy Med Name: ATORVASTATIN 10 MG TABLET] 90 tablet 1     Sig: TAKE 1 TABLET BY MOUTH EVERY DAY  Last Written Prescription Date:  4/4/19  Last Fill Quantity: 90,  # refills: 1   Last office visit: 4/4/2019 with prescribing provider:  Peter   Future Office Visit:           Statins Protocol Passed - 9/7/2019 12:27 AM        Passed - LDL on file in past 12 months     Recent Labs   Lab Test 04/04/19  1138   LDL 93             Passed - No abnormal creatine kinase in past 12 months     No lab results found.             Passed - Recent (12 mo) or future (30 days) visit within the authorizing provider's specialty     Patient had office visit in the last 12 months or has a visit in the next 30 days with authorizing provider or within the authorizing provider's specialty.  See \"Patient Info\" tab in inbasket, or \"Choose Columns\" in Meds & Orders section of the refill encounter.              Passed - Medication is active on med list        Passed - Patient is age 18 or older        Passed - No active pregnancy on record        Passed - No positive pregnancy test in past 12 months          "

## 2019-09-29 ENCOUNTER — HEALTH MAINTENANCE LETTER (OUTPATIENT)
Age: 84
End: 2019-09-29

## 2020-03-15 ENCOUNTER — HEALTH MAINTENANCE LETTER (OUTPATIENT)
Age: 85
End: 2020-03-15

## 2020-04-21 DIAGNOSIS — E78.5 HYPERLIPIDEMIA LDL GOAL <130: ICD-10-CM

## 2020-04-21 RX ORDER — ATORVASTATIN CALCIUM 10 MG/1
TABLET, FILM COATED ORAL
Qty: 30 TABLET | Refills: 0 | Status: SHIPPED | OUTPATIENT
Start: 2020-04-21 | End: 2020-04-29

## 2020-04-21 NOTE — TELEPHONE ENCOUNTER
Script refill faxed. Remind pt to do follow up for med check , phone or video visit  labs, since she is due.

## 2020-04-21 NOTE — TELEPHONE ENCOUNTER
Routing refill request to provider for review/approval because:  Labs not current:  LDL  Patient needs to be seen because it has been more than 1 year since last office visit.    Leona Ortega RN, BSN  [facility] [place]

## 2020-04-21 NOTE — LETTER
April 24, 2020      Floridalma Cunningham  [address]  [place] [address]        Dear Floridalma,    I care about your health and have reviewed your health plan. I have reviewed your medical conditions, medication list, and lab results and am making recommendations based on this review, to better manage your health.    You are in particular need of attention regarding:  -Labs and medications    I am recommending that you:  -Schedule a lab appointment and telephone or video visit with me    Here is a list of Health Maintenance topics that are due now or due soon:  Health Maintenance Due   Topic Date Due     Discuss Advance Care Planning  1935     Annual Wellness Visit  04/29/2000     Zoster (Shingles) Vaccine (2 of 3) 04/09/2009     Flu Vaccine (1) 09/01/2019     PHQ-2  01/01/2020     Comprehensive Metabolic Panel  04/04/2020     Cholesterol Lab  04/04/2020     FALL RISK ASSESSMENT  04/04/2020       Please call us at [phone] (or use [employer]) to address the above recommendations.     Thank you for trusting [facility] and we appreciate the opportunity to serve you.  We look forward to supporting your healthcare needs in the future.    Healthy Regards,    Dinah Green MD

## 2020-04-22 NOTE — TELEPHONE ENCOUNTER
Panchitot message sent.    .Cindy NOLASCO    [facility] Ty[facility] [place]

## 2020-04-23 NOTE — TELEPHONE ENCOUNTER
Left vm to schedule a telephone or video visit.    .Cindy NOLASCO    [facility]

## 2020-04-24 NOTE — TELEPHONE ENCOUNTER
Left vm, letter sent.  Patient needs a lab appointment and telephone or vid visit to follow up as it's been more than a year.    .Cindy NOLASCO    [facility]ra Esmeralda

## 2020-04-28 DIAGNOSIS — I10 ESSENTIAL HYPERTENSION: ICD-10-CM

## 2020-04-28 RX ORDER — HYDROCHLOROTHIAZIDE 25 MG/1
25 TABLET ORAL DAILY
Qty: 30 TABLET | Refills: 0 | Status: SHIPPED | OUTPATIENT
Start: 2020-04-28 | End: 2020-04-29

## 2020-04-28 NOTE — TELEPHONE ENCOUNTER
Routing refill request to provider for review/approval because:  Labs not current:  BP, CR, K+. NA  Overdue for office visit.     Leona Ortega RN, BSN  [facility] [place]

## 2020-04-28 NOTE — TELEPHONE ENCOUNTER
Patient is scheduled 04/29 with pcp.    .Cindy NOLASCO    [facility]ra [place]

## 2020-04-28 NOTE — TELEPHONE ENCOUNTER
Script refill faxed. Remind pt to do follow up for med check/ phone or video visit since she is due.

## 2020-04-28 NOTE — TELEPHONE ENCOUNTER
Reason for Call:  Hydrochlorothiazide 25 mg tab refill    Floridalma' daughter would like to  her Rx's on Friday 5/1.    Best phone number to reach pt at is: [phone]  Ok to leave a message with medical info? yes    Pharmacy preferred (if calling for a refill): [facility]    Arabella Alexander  Patient Representative

## 2020-04-29 ENCOUNTER — VIRTUAL VISIT (OUTPATIENT)
Dept: FAMILY MEDICINE | Facility: CLINIC | Age: 85
End: 2020-04-29
Payer: COMMERCIAL

## 2020-04-29 DIAGNOSIS — E78.5 HYPERLIPIDEMIA LDL GOAL <130: ICD-10-CM

## 2020-04-29 DIAGNOSIS — I10 ESSENTIAL HYPERTENSION: ICD-10-CM

## 2020-04-29 PROCEDURE — 99214 OFFICE O/P EST MOD 30 MIN: CPT | Mod: 95 | Performed by: FAMILY MEDICINE

## 2020-04-29 RX ORDER — HYDROCHLOROTHIAZIDE 25 MG/1
25 TABLET ORAL DAILY
Qty: 90 TABLET | Refills: 1 | Status: SHIPPED | OUTPATIENT
Start: 2020-04-29 | End: 2020-11-19

## 2020-04-29 RX ORDER — MULTIPLE VITAMINS W/ MINERALS TAB 9MG-400MCG
1 TAB ORAL DAILY
COMMUNITY

## 2020-04-29 RX ORDER — ATORVASTATIN CALCIUM 10 MG/1
10 TABLET, FILM COATED ORAL DAILY
Qty: 90 TABLET | Refills: 1 | Status: SHIPPED | OUTPATIENT
Start: 2020-04-29 | End: 2020-11-20

## 2020-04-29 RX ORDER — LISINOPRIL 20 MG/1
20 TABLET ORAL DAILY
Qty: 90 TABLET | Refills: 1 | Status: SHIPPED | OUTPATIENT
Start: 2020-04-29 | End: 2020-11-19

## 2020-04-29 NOTE — PROGRESS NOTES
"Floridalma Cunningham is a 85 year old female who is being evaluated via a billable telephone visit.      The patient has been notified of following:     \"This telephone visit will be conducted via a call between you and your physician/provider. We have found that certain health care needs can be provided without the need for a physical exam.  This service lets us provide the care you need with a short phone conversation.  If a prescription is necessary we can send it directly to your pharmacy.  If lab work is needed we can place an order for that and you can then stop by our lab to have the test done at a later time.    Telephone visits are billed at different rates depending on your insurance coverage. During this emergency period, for some insurers they may be billed the same as an in-person visit.  Please reach out to your insurance provider with any questions.    If during the course of the call the physician/provider feels a telephone visit is not appropriate, you will not be charged for this service.\"    Patient has given verbal consent for Telephone visit?  Yes    How would you like to obtain your AVS? Shadiahart    Subjective     Floridalma Cunningham is a 85 year old female who presents to clinic today for the following health issues:    HPI  Hyperlipidemia Follow-Up      Are you regularly taking any medication or supplement to lower your cholesterol?   Yes- Atrovastatin    Are you having muscle aches or other side effects that you think could be caused by your cholesterol lowering medication?  No    Hypertension Follow-up      Do you check your blood pressure regularly outside of the clinic? No  But  She is checked by the facility staff and it is mostly good      Are you following a low salt diet? No    Are your blood pressures ever more than 140 on the top number (systolic) OR more   than 90 on the bottom number (diastolic), for example 140/90? No      How many servings of fruits and vegetables do you eat daily?  " 0-1    On average, how many sweetened beverages do you drink each day (Examples: soda, juice, sweet tea, etc.  Do NOT count diet or artificially sweetened beverages)?   0    How many days per week do you exercise enough to make your heart beat faster? NONE    How many minutes a day do you exercise enough to make your heart beat faster? NA    How many days per week do you miss taking your medication? 0         PROBLEMS TO ADD ON...    Patient Active Problem List   Diagnosis     Essential hypertension     Hyperlipidemia LDL goal <130     Asymptomatic postmenopausal status     Arthritis of knee     No past surgical history on file.    Social History     Tobacco Use     Smoking status: Never Smoker     Smokeless tobacco: Never Used   Substance Use Topics     Alcohol use: Yes     Frequency: Monthly or less     Family History   Problem Relation Age of Onset     Coronary Artery Disease Father          of MI AT AGE 59      Bladder Cancer Brother            Reviewed and updated as needed this visit by Provider         Review of Systems   ROS COMP: Constitutional, HEENT, cardiovascular, pulmonary, GI, , musculoskeletal, neuro, skin, endocrine and psych systems are negative, except as otherwise noted.             Assessment/Plan:    1. Hyperlipidemia LDL goal <130     Has bee stable previously, due for labs need need refill   - atorvastatin (LIPITOR) 10 MG tablet; Take 1 tablet (10 mg) by mouth daily  Dispense: 90 tablet; Refill: 1  - Lipid panel reflex to direct LDL Fasting; Future  - **Comprehensive metabolic panel FUTURE anytime; Future    2. Essential hypertension    - hydrochlorothiazide (HYDRODIURIL) 25 MG tablet; Take 1 tablet (25 mg) by mouth daily  Dispense: 90 tablet; Refill: 1  - Lipid panel reflex to direct LDL Fasting; Future  - **Comprehensive metabolic panel FUTURE anytime; Future  - lisinopril (ZESTRIL) 20 MG tablet; Take 1 tablet (20 mg) by mouth daily  Dispense: 90 tablet; Refill: 1    BP has been in  adequate control. Discussed cares, low fat low salt  diet etc. She will return for labs    Refill given. encouraged  BP monitoring. Follow up recheck in 4-6  months, sooner if problem.       Check labs,she will schedule . refill sent.Cares and  treatment discussed.  follow up if problem   Patient expressed understanding and agreement with treatment plan. All patient's questions were answered, will let me know if has more later.  Medications: Rx's: Reviewed the potential side effects/complications of medications prescribed.   She was also debating to come for shingle vaccine a later time. She will check with insurance and decide     Phone call duration:  13 minutes    Dinah Green MD

## 2020-06-22 ENCOUNTER — TELEPHONE (OUTPATIENT)
Dept: FAMILY MEDICINE | Facility: CLINIC | Age: 85
End: 2020-06-22

## 2020-06-22 DIAGNOSIS — R35.0 URINARY FREQUENCY: Primary | ICD-10-CM

## 2020-06-22 NOTE — TELEPHONE ENCOUNTER
Reason for call:  Patient reporting a symptom    Symptom or request: UTI - increased urinary frequency    Duration (how long have symptoms been present): 2 days    Have you been treated for this before? Yes    Additional comments: Patient calling, states she has a hx of UTIs and is confident this is one. Patient would like to know if she can get medication sent to [facility] [address] without a visit.    Phone Number patient can be reached at:  Home number on file [phone] (home)    Best Time:  anytime    Can we leave a detailed message on this number:  NO    Call taken on 6/22/2020 at 9:12 AM by Areli HERNANDEZ

## 2020-06-22 NOTE — TELEPHONE ENCOUNTER
Called patient to gather more information on symptoms. Patient states she has a history of bladder infections and was advised to be on cranberry pills, which has improved patients symptoms from reoccurring frequently.     Patient states she usually is asymptotic when she has been diagnosed with bladder infections. Patient states she lives in a [facility] and does not have a car to come into the clinic.     Patient is requesting to have medication prescribed without being seen.     Current Symptoms:    -Urinary frequency    No blood in urine, no burning with urination, cloudy/foul smelling urine, fevers, flank pain.       Pharmacy pended. Routing to  providers to advise. Thank you.       Okay to leave a detailed message? YES    Leona Ortega RN, BSN  [facility] [place]

## 2020-06-22 NOTE — TELEPHONE ENCOUNTER
Called patient and informed her of MD response below. Patient verbalized understanding and agrees with plan.     Leona Ortega RN, BSN  [facility] [place]

## 2020-06-22 NOTE — TELEPHONE ENCOUNTER
Ideally we would have a urine culturebut for this situation an empiric antibiotic would be okay.  I sent in keflex.  If she isn't noting any improvement in her symptoms in 48hrs, will need to come in to leave a sample     Eliza Banegas MD

## 2020-07-06 ENCOUNTER — TELEPHONE (OUTPATIENT)
Dept: FAMILY MEDICINE | Facility: CLINIC | Age: 85
End: 2020-07-06

## 2020-07-06 DIAGNOSIS — N39.0 URINARY TRACT INFECTION WITHOUT HEMATURIA, SITE UNSPECIFIED: ICD-10-CM

## 2020-07-06 DIAGNOSIS — B96.89 BV (BACTERIAL VAGINOSIS): ICD-10-CM

## 2020-07-06 DIAGNOSIS — R35.0 URINARY FREQUENCY: Primary | ICD-10-CM

## 2020-07-06 DIAGNOSIS — N76.0 BV (BACTERIAL VAGINOSIS): ICD-10-CM

## 2020-07-06 NOTE — TELEPHONE ENCOUNTER
Patient denies any other symptoms except urinary frequency. Denies fever, back pain, blood in urine, foul odor of urine.     Patient states that in the past she has had UTI without symptoms.    Patient had frequency on 6/22 and was treated without urinalysis with Keflex. Patient states that she was better in 2 days, but now her symptoms have returned.    Patient has future lab orders in her chart for CMP and FLP. Patient is scheduled for fasting lab appointment at 10:15 tomorrow.    Please order urinalysis. Please advise if you would like patient to schedule telephone encounter.  rAaseli Miranda RN

## 2020-07-06 NOTE — TELEPHONE ENCOUNTER
Order placed for UA an wet prep , bc  of her recurrent sx. If she is comfortable to wait till tomorrow then I would rather wait to see lab results , before starting  treatment

## 2020-07-06 NOTE — TELEPHONE ENCOUNTER
Called patient and informed her of MD note below. Patient verbalized understanding and agrees with plan.     Leona Ortega RN, BSN  [facility]

## 2020-07-06 NOTE — TELEPHONE ENCOUNTER
Reason for Call:  Other call back    Detailed comments: Pt had uti a couple weeks ago and feels is back again. Pt has no pain but frequent urination.  Pt would like another script  -Pt uses [facility] [place]    Phone Number Patient can be reached at: Home number on file [phone] (home)    Best Time: anytime    Can we leave a detailed message on this number? YES    Call taken on 7/6/2020 at 7:30 AM by Paola Villegas

## 2020-07-07 DIAGNOSIS — I10 ESSENTIAL HYPERTENSION: ICD-10-CM

## 2020-07-07 DIAGNOSIS — E78.5 HYPERLIPIDEMIA LDL GOAL <130: ICD-10-CM

## 2020-07-07 DIAGNOSIS — R35.0 URINARY FREQUENCY: ICD-10-CM

## 2020-07-07 LAB
ALBUMIN SERPL-MCNC: 3.7 G/DL (ref 3.4–5)
ALBUMIN UR-MCNC: 30 MG/DL
ALP SERPL-CCNC: 101 U/L (ref 40–150)
ALT SERPL W P-5'-P-CCNC: 33 U/L (ref 0–50)
ANION GAP SERPL CALCULATED.3IONS-SCNC: 8 MMOL/L (ref 3–14)
APPEARANCE UR: ABNORMAL
AST SERPL W P-5'-P-CCNC: 22 U/L (ref 0–45)
BACTERIA #/AREA URNS HPF: ABNORMAL /HPF
BILIRUB SERPL-MCNC: 1 MG/DL (ref 0.2–1.3)
BILIRUB UR QL STRIP: NEGATIVE
BUN SERPL-MCNC: 10 MG/DL (ref 7–30)
CALCIUM SERPL-MCNC: 9.4 MG/DL (ref 8.5–10.1)
CHLORIDE SERPL-SCNC: 98 MMOL/L (ref 94–109)
CHOLEST SERPL-MCNC: 164 MG/DL
CO2 SERPL-SCNC: 24 MMOL/L (ref 20–32)
COLOR UR AUTO: YELLOW
CREAT SERPL-MCNC: 0.66 MG/DL (ref 0.52–1.04)
GFR SERPL CREATININE-BSD FRML MDRD: 81 ML/MIN/{1.73_M2}
GLUCOSE SERPL-MCNC: 112 MG/DL (ref 70–99)
GLUCOSE UR STRIP-MCNC: NEGATIVE MG/DL
HDLC SERPL-MCNC: 68 MG/DL
HGB UR QL STRIP: ABNORMAL
KETONES UR STRIP-MCNC: NEGATIVE MG/DL
LDLC SERPL CALC-MCNC: 72 MG/DL
LEUKOCYTE ESTERASE UR QL STRIP: ABNORMAL
NITRATE UR QL: POSITIVE
NON-SQ EPI CELLS #/AREA URNS LPF: ABNORMAL /LPF
NONHDLC SERPL-MCNC: 96 MG/DL
PH UR STRIP: 6 PH (ref 5–7)
POTASSIUM SERPL-SCNC: 3.9 MMOL/L (ref 3.4–5.3)
PROT SERPL-MCNC: 7.5 G/DL (ref 6.8–8.8)
RBC #/AREA URNS AUTO: ABNORMAL /HPF
SODIUM SERPL-SCNC: 130 MMOL/L (ref 133–144)
SOURCE: ABNORMAL
SP GR UR STRIP: 1.02 (ref 1–1.03)
SPECIMEN SOURCE: ABNORMAL
TRIGL SERPL-MCNC: 119 MG/DL
UROBILINOGEN UR STRIP-ACNC: 0.2 EU/DL (ref 0.2–1)
WBC #/AREA URNS AUTO: >100 /HPF
WET PREP SPEC: ABNORMAL

## 2020-07-07 PROCEDURE — 80061 LIPID PANEL: CPT | Performed by: FAMILY MEDICINE

## 2020-07-07 PROCEDURE — 87088 URINE BACTERIA CULTURE: CPT | Performed by: FAMILY MEDICINE

## 2020-07-07 PROCEDURE — 81001 URINALYSIS AUTO W/SCOPE: CPT | Performed by: FAMILY MEDICINE

## 2020-07-07 PROCEDURE — 87210 SMEAR WET MOUNT SALINE/INK: CPT | Performed by: FAMILY MEDICINE

## 2020-07-07 PROCEDURE — 80053 COMPREHEN METABOLIC PANEL: CPT | Performed by: FAMILY MEDICINE

## 2020-07-07 PROCEDURE — 87186 SC STD MICRODIL/AGAR DIL: CPT | Performed by: FAMILY MEDICINE

## 2020-07-07 PROCEDURE — 36415 COLL VENOUS BLD VENIPUNCTURE: CPT | Performed by: FAMILY MEDICINE

## 2020-07-07 PROCEDURE — 87086 URINE CULTURE/COLONY COUNT: CPT | Performed by: FAMILY MEDICINE

## 2020-07-08 RX ORDER — METRONIDAZOLE 7.5 MG/G
1 GEL VAGINAL DAILY
Qty: 25 G | Refills: 0 | Status: SHIPPED | OUTPATIENT
Start: 2020-07-08 | End: 2020-07-13

## 2020-07-08 RX ORDER — CIPROFLOXACIN 250 MG/1
250 TABLET, FILM COATED ORAL 2 TIMES DAILY
Qty: 10 TABLET | Refills: 0 | Status: SHIPPED | OUTPATIENT
Start: 2020-07-08 | End: 2020-07-15

## 2020-07-09 LAB
BACTERIA SPEC CULT: ABNORMAL
BACTERIA SPEC CULT: ABNORMAL
SPECIMEN SOURCE: ABNORMAL

## 2020-07-15 ENCOUNTER — TELEPHONE (OUTPATIENT)
Dept: FAMILY MEDICINE | Facility: CLINIC | Age: 85
End: 2020-07-15

## 2020-07-15 DIAGNOSIS — N39.0 URINARY TRACT INFECTION WITHOUT HEMATURIA, SITE UNSPECIFIED: ICD-10-CM

## 2020-07-15 DIAGNOSIS — R35.0 URINARY FREQUENCY: ICD-10-CM

## 2020-07-15 RX ORDER — CIPROFLOXACIN 250 MG/1
250 TABLET, FILM COATED ORAL 2 TIMES DAILY
Qty: 10 TABLET | Refills: 0 | Status: SHIPPED | OUTPATIENT
Start: 2020-07-15 | End: 2021-04-12

## 2020-07-15 NOTE — TELEPHONE ENCOUNTER
RN called patient to triage symptoms. Patient was diagnosed with UTI from  on 7/7/2020. Patient finished her course of antibiotics on Monday. Patient stated her symptoms had improved and now have come back.     The only symptoms patient has currently  urinary frequency.  Patient states that she never has the other symptoms besides urinary frequency. Patient denies fever, back pain/flank pain, blood in urine, foul odor of urine, burning with urination, pain with urination.     Patient is currently up at her Cabin and is requesting if the antibiotic prescription could be extended.     Dr. Green only worked until noon today and patient would appreciate it if this can be addressed today. Routing to  tpoviders to advise.     Pharmacy pended.       Call back patient at [phone]  Okay to leave a detailed message? YES    Leona Ortega RN, BSN  [facility] [place]

## 2020-07-15 NOTE — TELEPHONE ENCOUNTER
Called patient and informed her of MD response below. Patient verbalized understanding and agrees with plan.     Leona Ortega RN, BSN  [facility] [place]

## 2020-07-15 NOTE — TELEPHONE ENCOUNTER
Reason for Call:  Other prescription    Detailed comments: Pt was given a prescription for a UTI, sxs got better and now have came back. Pt is wondering if she can get prescriptions sent again (ciprofloxacin (CIPRO) 250 MG tablet).    [place] is [place] their cabin. Wants rx sent to:  [address] [place] [address]  [phone]     Pt hoping to get tonight or tomorrow, they will be home Friday.    Phone Number Patient can be reached at: Cell number on file:    Pt cell; [phone]       Best Time: any    Can we leave a detailed message on this number? YES    Call taken on 7/15/2020 at 3:44 PM by Sofy oH

## 2020-08-21 ENCOUNTER — VIRTUAL VISIT (OUTPATIENT)
Dept: FAMILY MEDICINE | Facility: CLINIC | Age: 85
End: 2020-08-21
Payer: COMMERCIAL

## 2020-08-21 DIAGNOSIS — N39.0 URINARY TRACT INFECTION WITHOUT HEMATURIA, SITE UNSPECIFIED: Primary | ICD-10-CM

## 2020-08-21 PROCEDURE — 99213 OFFICE O/P EST LOW 20 MIN: CPT | Mod: 95 | Performed by: INTERNAL MEDICINE

## 2020-08-21 RX ORDER — CIPROFLOXACIN 250 MG/1
250 TABLET, FILM COATED ORAL 2 TIMES DAILY
Qty: 10 TABLET | Refills: 0 | Status: SHIPPED | OUTPATIENT
Start: 2020-08-21 | End: 2020-08-26

## 2020-08-21 NOTE — PROGRESS NOTES
"Floridalma Cunningham is a 85 year old female who is being evaluated via a billable telephone visit.      The patient has been notified of following:     \"This telephone visit will be conducted via a call between you and your physician/provider. We have found that certain health care needs can be provided without the need for a physical exam.  This service lets us provide the care you need with a short phone conversation.  If a prescription is necessary we can send it directly to your pharmacy.  If lab work is needed we can place an order for that and you can then stop by our lab to have the test done at a later time.    Telephone visits are billed at different rates depending on your insurance coverage. During this emergency period, for some insurers they may be billed the same as an in-person visit.  Please reach out to your insurance provider with any questions.    If during the course of the call the physician/provider feels a telephone visit is not appropriate, you will not be charged for this service.\"    Patient has given verbal consent for Telephone visit?  Yes    What phone number would you like to be contacted at? [phone]    How would you like to obtain your AVS? MyChart    Subjective     Floridalma Cunningham is a 85 year old female who presents via phone visit today for the following health issues:    HPI    Genitourinary - Female    Onset/Duration: 2 days  Description:   Painful urination (Dysuria): no           Frequency: YES  Blood in urine (Hematuria): no  Delay in urine (Hesitency): no  Intensity: moderate  Progression of Symptoms:  same  Accompanying Signs & Symptoms:  Fever/chills: no  Flank pain: no  Nausea and vomiting: no  Vaginal symptoms: itching  Abdominal/Pelvic Pain: no  History:   History of frequent UTI s: Recently treated in July  History of kidney stones: no  Sexually Active: no  Possibility of pregnancy: No  Precipitating or alleviating factors: None  Therapies tried and outcome: " Jewelsberry    Floridalma is calling with concern for a UTI.  She had them frequently in the past, then started taking cranberry pills and hadn't had for 10 years or so.  But this summer she has been on antibiotics 3 times already.  Wondering if maybe the new brand of incontinence pads has triggered this?  She is having her usual symptom which is just frequency, and moreso at night than during the day.  The last time she was treated with cipro and that seemed to work well.       Review of Systems   Const, GI,  reviewed,  otherwise negative unless noted above.         Objective          Vitals:  No vitals were obtained today due to virtual visit.    healthy, alert and no distress  PSYCH: Alert and oriented times 3; coherent speech, normal   rate and volume, able to articulate logical thoughts, able   to abstract reason, no tangential thoughts, no hallucinations   or delusions  Her affect is normal  RESP: No cough, no audible wheezing, able to talk in full sentences  Remainder of exam unable to be completed due to telephone visits    Last Urine culture done 7/7/2020 grew pan-sensitive E coli        Assessment/Plan:    Assessment & Plan     Urinary tract infection without hematuria, site unspecified  Will treat again with cipro based on last culture.  If happens again, consider urology referral.   - ciprofloxacin (CIPRO) 250 MG tablet; Take 1 tablet (250 mg) by mouth 2 times daily for 5 days           Return in about 2 months (around 10/21/2020) for Physical Exam.    Eliza Banegas MD  [facility] [place]    Phone call duration:  7 minutes

## 2020-08-28 ENCOUNTER — TELEPHONE (OUTPATIENT)
Dept: FAMILY MEDICINE | Facility: CLINIC | Age: 85
End: 2020-08-28

## 2020-08-28 DIAGNOSIS — N39.0 RECURRENT UTI: Primary | ICD-10-CM

## 2020-08-28 NOTE — TELEPHONE ENCOUNTER
Patient was notified with information noted by provider and agreed with plan.  Patient will call back to schedule a lab only appointment once she is able to find a ride.  Patient would not schedule an appointment at this time.  NICK CadenaN, RN  [employer] Triage

## 2020-08-28 NOTE — TELEPHONE ENCOUNTER
Medication Question or Refill    Who is calling: patient    What medication are you calling about (include dose and sig)?: Floridalma states she has been struggling with a UTI for weeks now and has gone through 4 bottles of antibiotics. States she discussed with Dr. Banegas at last virtual visit possibly starting a low dose of antibiotics to take every day for a longer period of time. Floridalma is interested in trying that.    Who prescribed the medication?: Dr. Banegas    Do you need a refill? Yes    When did you use the medication last? New rx    Patient offered an appointment? Yes - Pt prefers to send a msg    Do you have any questions or concerns?  No    Requested Pharmacy: [facility] [address]    Okay to leave a detailed message?: Yes at Home number on file [phone] (home)

## 2020-08-28 NOTE — TELEPHONE ENCOUNTER
It would be ideal to get a culture since the daily dose doesn't actually treat a UTI. So we should make sure the bacteria are all the way gone before starting.     Eliza Banegas MD

## 2020-08-28 NOTE — TELEPHONE ENCOUNTER
Did the symptoms from her most recent UTI clear, or are they still present? If they are resolved, we can just go ahead and start the prophylactic medication.  If she is having symptoms, I would like to have a urine culture done prior to starting the prophylactic antibiotic.    Eliza Banegas MD

## 2020-08-28 NOTE — TELEPHONE ENCOUNTER
S/w pt who states the most recent sxs of UTI was cleared with abx but last night she was up 6 times to urinate.  Pt states she is drinking a lot of water during the day and does not keep track how many times she goes to the bathroom.  Pt states she does not have a car now and would be hard to get to the clinic.  Was hoping Dr. Banegas would order the abx.    Pt can be reached at [phone].    Rocío CLEANING RN  EP Triage

## 2020-08-31 DIAGNOSIS — N39.0 RECURRENT UTI: ICD-10-CM

## 2020-08-31 LAB
ALBUMIN UR-MCNC: NEGATIVE MG/DL
APPEARANCE UR: ABNORMAL
BACTERIA #/AREA URNS HPF: ABNORMAL /HPF
BILIRUB UR QL STRIP: NEGATIVE
COLOR UR AUTO: YELLOW
GLUCOSE UR STRIP-MCNC: NEGATIVE MG/DL
HGB UR QL STRIP: NEGATIVE
KETONES UR STRIP-MCNC: ABNORMAL MG/DL
LEUKOCYTE ESTERASE UR QL STRIP: ABNORMAL
NITRATE UR QL: NEGATIVE
NON-SQ EPI CELLS #/AREA URNS LPF: ABNORMAL /LPF
PH UR STRIP: 7 PH (ref 5–7)
RBC #/AREA URNS AUTO: ABNORMAL /HPF
SOURCE: ABNORMAL
SP GR UR STRIP: 1.02 (ref 1–1.03)
UROBILINOGEN UR STRIP-ACNC: 1 EU/DL (ref 0.2–1)
WBC #/AREA URNS AUTO: ABNORMAL /HPF

## 2020-08-31 PROCEDURE — 87086 URINE CULTURE/COLONY COUNT: CPT | Performed by: INTERNAL MEDICINE

## 2020-08-31 PROCEDURE — 81001 URINALYSIS AUTO W/SCOPE: CPT | Performed by: INTERNAL MEDICINE

## 2020-09-01 LAB
BACTERIA SPEC CULT: NORMAL
SPECIMEN SOURCE: NORMAL

## 2020-09-01 RX ORDER — TRIMETHOPRIM 100 MG/1
100 TABLET ORAL DAILY
Qty: 90 TABLET | Refills: 0 | Status: SHIPPED | OUTPATIENT
Start: 2020-09-01 | End: 2020-11-19

## 2020-09-01 NOTE — TELEPHONE ENCOUNTER
Thank you for coming in for the sample.  Urine culture is negative.  I sent in trimethoprim to take once daily.  She should reassess in 3 months with Dr. Green.     Eliza Banegas MD

## 2020-09-01 NOTE — TELEPHONE ENCOUNTER
Patient given message from Dr. Banegas. Patient states that she is still having some urgency during the night, but her symptoms have improved.   Patient agrees to follow up in 3 months with Dr. Green or sooner if new or worsening symptoms. Araseli Miranda RN

## 2020-11-19 DIAGNOSIS — E78.5 HYPERLIPIDEMIA LDL GOAL <130: ICD-10-CM

## 2020-11-19 DIAGNOSIS — N39.0 RECURRENT UTI: ICD-10-CM

## 2020-11-19 DIAGNOSIS — I10 ESSENTIAL HYPERTENSION: ICD-10-CM

## 2020-11-19 RX ORDER — HYDROCHLOROTHIAZIDE 25 MG/1
TABLET ORAL
Qty: 90 TABLET | Refills: 0 | Status: SHIPPED | OUTPATIENT
Start: 2020-11-19 | End: 2021-02-16

## 2020-11-19 RX ORDER — LISINOPRIL 20 MG/1
TABLET ORAL
Qty: 90 TABLET | Refills: 0 | Status: SHIPPED | OUTPATIENT
Start: 2020-11-19 | End: 2021-04-12

## 2020-11-19 RX ORDER — TRIMETHOPRIM 100 MG/1
TABLET ORAL
Qty: 90 TABLET | Refills: 0 | Status: SHIPPED | OUTPATIENT
Start: 2020-11-19 | End: 2021-02-16

## 2020-11-19 NOTE — TELEPHONE ENCOUNTER
Routing refill request to provider for review/approval because:  Drug not on the [facility] refill protocol   Araseli Miranda RN

## 2020-11-19 NOTE — LETTER
November 27, 2020      Floridalma Cunningham  [address] [place] [address]        Dear Floridalma,       Our records indicates that it is time for you to be seen for an office visit with Dr. Green.    Please call our office at [phone] to schedule an appointment for Virtual visit for a blood pressure follow up before you are due for any further refills.     Please disregard this notice if you have already made an appointment.    If you are no longer a [facility] patient; Please contact us and let us know that as well. You will also need to the let the pharmacy know the name of your current Provider so that they can send future request to them.       Sincerely,    [facility] [facility]

## 2020-11-19 NOTE — TELEPHONE ENCOUNTER
Routing refill request to provider for review/approval because:  Labs out of range:    Sodium   Date Value Ref Range Status   07/07/2020 130 (L) 133 - 144 mmol/L Final     BP Readings from Last 3 Encounters:   04/04/19 134/70   08/15/18 135/78   08/01/18 139/71     Over a year since last BP.   Araseli Miranda RN

## 2020-11-19 NOTE — TELEPHONE ENCOUNTER
Panchitot message sent.    .Cindy NOLASCO    [facility] [place]

## 2020-11-19 NOTE — TELEPHONE ENCOUNTER
Script refill faxed. Remind pt to do follow up for med check  since she is due. Last BP was out of range, Virtual/ Video visit ok

## 2020-11-19 NOTE — TELEPHONE ENCOUNTER
Script refill faxed. Remind pt to do follow up for med check and annual wellness exam , since she is due. Virtual/ Video visit is ok too ( if per insurance no issue with virtual for wellness exam)

## 2020-11-20 RX ORDER — ATORVASTATIN CALCIUM 10 MG/1
TABLET, FILM COATED ORAL
Qty: 90 TABLET | Refills: 1 | Status: SHIPPED | OUTPATIENT
Start: 2020-11-20 | End: 2021-02-16

## 2020-11-20 NOTE — TELEPHONE ENCOUNTER
Pres[place] per [facility] [place]    Rocío CLEANING RN  EP Triage

## 2020-11-24 NOTE — TELEPHONE ENCOUNTER
Left vm to call back and schedule a vrt apt.    .Cindy NOLASCO    [facility] [place] [place]

## 2021-01-14 ENCOUNTER — HEALTH MAINTENANCE LETTER (OUTPATIENT)
Age: 86
End: 2021-01-14

## 2021-02-16 ENCOUNTER — OFFICE VISIT (OUTPATIENT)
Dept: FAMILY MEDICINE | Facility: CLINIC | Age: 86
End: 2021-02-16
Payer: COMMERCIAL

## 2021-02-16 VITALS
HEART RATE: 60 BPM | TEMPERATURE: 98.1 F | WEIGHT: 168 LBS | BODY MASS INDEX: 27.96 KG/M2 | DIASTOLIC BLOOD PRESSURE: 50 MMHG | OXYGEN SATURATION: 97 % | SYSTOLIC BLOOD PRESSURE: 96 MMHG

## 2021-02-16 DIAGNOSIS — I10 ESSENTIAL HYPERTENSION: ICD-10-CM

## 2021-02-16 DIAGNOSIS — E78.5 HYPERLIPIDEMIA LDL GOAL <130: ICD-10-CM

## 2021-02-16 DIAGNOSIS — N39.0 RECURRENT UTI: ICD-10-CM

## 2021-02-16 PROCEDURE — 36415 COLL VENOUS BLD VENIPUNCTURE: CPT | Performed by: FAMILY MEDICINE

## 2021-02-16 PROCEDURE — 80053 COMPREHEN METABOLIC PANEL: CPT | Performed by: FAMILY MEDICINE

## 2021-02-16 PROCEDURE — 99214 OFFICE O/P EST MOD 30 MIN: CPT | Performed by: FAMILY MEDICINE

## 2021-02-16 RX ORDER — TRIMETHOPRIM 100 MG/1
100 TABLET ORAL DAILY
Qty: 90 TABLET | Refills: 1 | Status: SHIPPED | OUTPATIENT
Start: 2021-02-16 | End: 2021-08-25

## 2021-02-16 RX ORDER — LISINOPRIL 20 MG/1
20 TABLET ORAL DAILY
Qty: 90 TABLET | Refills: 1 | Status: SHIPPED | OUTPATIENT
Start: 2021-02-16 | End: 2021-08-25

## 2021-02-16 RX ORDER — HYDROCHLOROTHIAZIDE 25 MG/1
25 TABLET ORAL DAILY
Qty: 90 TABLET | Refills: 1 | Status: SHIPPED | OUTPATIENT
Start: 2021-02-16 | End: 2021-08-25

## 2021-02-16 RX ORDER — LISINOPRIL 20 MG/1
20 TABLET ORAL DAILY
Qty: 90 TABLET | Refills: 0 | Status: CANCELLED | OUTPATIENT
Start: 2021-02-16

## 2021-02-16 RX ORDER — ATORVASTATIN CALCIUM 10 MG/1
10 TABLET, FILM COATED ORAL DAILY
Qty: 90 TABLET | Refills: 1 | Status: SHIPPED | OUTPATIENT
Start: 2021-02-16 | End: 2021-08-25

## 2021-02-16 NOTE — PROGRESS NOTES
Assessment & Plan     (I10) Essential hypertension  Comment:   Plan: hydrochlorothiazide (HYDRODIURIL) 25 MG tablet,        lisinopril (ZESTRIL) 20 MG tablet,         Comprehensive metabolic panel        BP in adequate control although a bit on low side today  Discussed cares, low fat low salt  diet etc. Her sodium was bit low last time, so curious to see results.             Check labs, call pt with results.          Refill given. encouraged home BP monitoring. Follow up recheck in 6 months, sooner if problem.       (E78.5) Hyperlipidemia LDL goal <130  Comment:   Plan: atorvastatin (LIPITOR) 10 MG tablet,         Comprehensive metabolic panel              (N39.0) Recurrent UTI  Comment: doing well and wants to continue   Plan: trimethoprim (TRIMPEX) 100 MG tablet            Check labs. refill sent.Cares and  treatment discussed follow. up if problem   Patient expressed understanding and agreement with treatment plan. All patient's questions were answered, will let me know if has more later.  Medications: Rx's: Reviewed the potential side effects/complications of medications prescribed.     Dinah Green MD  [facility] Hedrick is a 85 year old who presents for the following health issues     HPI       Hyperlipidemia Follow-Up      Are you regularly taking any medication or supplement to lower your cholesterol?   Yes- Atorvastatin    Are you having muscle aches or other side effects that you think could be caused by your cholesterol lowering medication?  No    Hypertension Follow-up      Do you check your blood pressure regularly outside of the clinic? No  But she thinsk she is doing good. Denies any chest pain palpitation sob leg edema etc.     sometimes has gas upset stomach feeling not sure what could be causing the problem although she thinks  she always has been somewhat like that so  she does not think it is related to medication , since  sh has been  on same  med's for a long time     Last visit sodium was low so needed a follow up checking not fasting for labs today     Are you following a low salt diet? No    Are your blood pressures ever more than 140 on the top number (systolic) OR more   than 90 on the bottom number (diastolic), for example 140/90? NA      How many servings of fruits and vegetables do you eat daily?  0-1    On average, how many sweetened beverages do you drink each day (Examples: soda, juice, sweet tea, etc.  Do NOT count diet or artificially sweetened beverages)?   0    How many days per week do you exercise enough to make your heart beat faster? 7    How many minutes a day do you exercise enough to make your heart beat faster? 30 - 60    How many days per week do you miss taking your medication? 0    ADD ON PROBLEM   Has hx of recurrent UTI, so she has been placed on trimethoprim few months ago and it has helped and she has had no recurrence and she wasn't to continue. Denies any problem taking med's and manuelito like get refill     Review of Systems   Constitutional, HEENT, cardiovascular, pulmonary, GI, , musculoskeletal, neuro, skin, endocrine and psych systems are negative, except as otherwise noted.      Objective    BP 96/50   Pulse 60   Temp 98.1  F (36.7  C) (Tympanic)   Wt 76.2 kg (168 lb)   SpO2 97%   BMI 27.96 kg/m    Body mass index is 27.96 kg/m .  Physical Exam   GENERAL: pleasant , alert and no distress, but she declined to climb on exam table,  so prefers to just stay in her  chair to be examined . She has walker with wheels  to walk looks and looks comfortable,   HENT: oropharynx clear and oral mucous membranes moist  NECK: no adenopathy  RESP: lungs clear to auscultation - no rales, rhonchi or wheezes  CV: regular rate and rhythm, normal S1 S2, no S3 or S4,   ABDOMEN: soft, nontender,   MS: no edema  NEURO: grossly normal  mentation intact and speech normal  PSYCH: mentation appears normal, affect normal

## 2021-02-16 NOTE — PATIENT INSTRUCTIONS
Take medications as directed.  Check labs  Cares and symptomatic cares discussed   Follow up if problem or concern

## 2021-02-17 LAB
ALBUMIN SERPL-MCNC: 3.5 G/DL (ref 3.4–5)
ALP SERPL-CCNC: 104 U/L (ref 40–150)
ALT SERPL W P-5'-P-CCNC: 37 U/L (ref 0–50)
ANION GAP SERPL CALCULATED.3IONS-SCNC: 8 MMOL/L (ref 3–14)
AST SERPL W P-5'-P-CCNC: 28 U/L (ref 0–45)
BILIRUB SERPL-MCNC: 0.6 MG/DL (ref 0.2–1.3)
BUN SERPL-MCNC: 15 MG/DL (ref 7–30)
CALCIUM SERPL-MCNC: 9.5 MG/DL (ref 8.5–10.1)
CHLORIDE SERPL-SCNC: 101 MMOL/L (ref 94–109)
CO2 SERPL-SCNC: 27 MMOL/L (ref 20–32)
CREAT SERPL-MCNC: 1.01 MG/DL (ref 0.52–1.04)
GFR SERPL CREATININE-BSD FRML MDRD: 50 ML/MIN/{1.73_M2}
GLUCOSE SERPL-MCNC: 167 MG/DL (ref 70–99)
POTASSIUM SERPL-SCNC: 4 MMOL/L (ref 3.4–5.3)
PROT SERPL-MCNC: 6.8 G/DL (ref 6.8–8.8)
SODIUM SERPL-SCNC: 136 MMOL/L (ref 133–144)

## 2021-04-12 ENCOUNTER — VIRTUAL VISIT (OUTPATIENT)
Dept: FAMILY MEDICINE | Facility: CLINIC | Age: 86
End: 2021-04-12
Payer: COMMERCIAL

## 2021-04-12 DIAGNOSIS — M17.10 ARTHRITIS OF KNEE: ICD-10-CM

## 2021-04-12 DIAGNOSIS — E78.5 HYPERLIPIDEMIA LDL GOAL <130: ICD-10-CM

## 2021-04-12 DIAGNOSIS — G60.9 IDIOPATHIC PERIPHERAL NEUROPATHY: ICD-10-CM

## 2021-04-12 DIAGNOSIS — Z00.00 ENCOUNTER FOR WELLNESS EXAMINATION IN ADULT: Primary | ICD-10-CM

## 2021-04-12 DIAGNOSIS — M79.18 CHRONIC GLUTEAL PAIN: ICD-10-CM

## 2021-04-12 DIAGNOSIS — R73.03 PREDIABETES: ICD-10-CM

## 2021-04-12 DIAGNOSIS — Z13.29 SCREENING FOR THYROID DISORDER: ICD-10-CM

## 2021-04-12 DIAGNOSIS — G89.29 CHRONIC GLUTEAL PAIN: ICD-10-CM

## 2021-04-12 DIAGNOSIS — I10 ESSENTIAL HYPERTENSION: ICD-10-CM

## 2021-04-12 PROCEDURE — 99214 OFFICE O/P EST MOD 30 MIN: CPT | Mod: 25 | Performed by: FAMILY MEDICINE

## 2021-04-12 PROCEDURE — G0438 PPPS, INITIAL VISIT: HCPCS | Mod: 95 | Performed by: FAMILY MEDICINE

## 2021-04-12 NOTE — PATIENT INSTRUCTIONS
Patient Education     Prevention Guidelines, Women Ages 65 and Older  Screening tests and vaccines are an important part of managing your health. A screening test is done to find possible disorders or diseases in people who don't have any symptoms. The goal is to find a disease early so lifestyle changes can be made and you can be watched more closely to reduce the risk of disease, or to detect it early enough to treat it most effectively. Screening tests are not considered diagnostic, but are used to determine if more testing is needed. Health counseling is essential, too. Below are guidelines for these, for women ages 65 and older. Talk with your healthcare provider to make sure you re up to date on what you need.  Screening Who needs it How often   Type 2 diabetes or prediabetes All women beginning at age 45 and women without symptoms at any age who are overweight or obese and have 1 or more additional risk factors for diabetes At least every 3 years   Type 2 diabetes All women with prediabetes Every year   Unhealthy alcohol use All women in this age group At routine exams   Blood pressure All women in this age group Yearly checkup if your blood pressure is normal  Normal blood pressure is less than 120/80 mm Hg  If your blood pressure reading is higher than normal, follow the advice of your healthcare provider   Breast cancer All women of average risk Mammograms should be done every 1 or 2 years. Talk with your healthcare provider about your risk factors and how often you need the test and for how long.   Cervical cancer Only women who had abnormal screening results before age 65 Talk with your healthcare provider   Chlamydia Women at increased risk for infection At routine exams   Colorectal cancer All women at average risk in this age group through age 75 who are in good health. For women ages 76 to 85, talk with your healthcare provider about whether to continue screening. For women 85 and older, screening  is not needed. Multiple tests are available and are used at different times. Possible tests include:    Flexible sigmoidoscopy every 5 years, or    Colonoscopy every 10 years, or    CT colonography (virtual colonoscopy) every 5 years, or    Yearly fecal occult blood test, or    Yearly fecal immunochemical test every year, or    Stool DNA test, every 3 years  If you choose a test other than a colonoscopy and have an abnormal test result, you will need to follow-up with a colonoscopy. Talk with your healthcare provider which test is best for you.  Some people should be screened using a different schedule because of their personal or family health history. Talk with your healthcare provider about your health history.   Depression All women in this age group At routine exams   Gonorrhea Sexually active women at increased risk for infection At yearly routine exams   Hepatitis C Anyone at increased risk; 1 time for those born between 1945 and 1965 At routine exams   High cholesterol or triglycerides All women in this age group who are at risk for coronary artery disease At least every 5 years; talk with your healthcare provider about your risk   HIV Women at increased risk for infection At routine exams; talk with your healthcare provider about your risk   Lung cancer Adults ages 55 to 74 who are in fairly good health and are at higher risk for lung cancer    Currently smoke or have quit within the past 15 years    30-pack year smoking history  Eligibility criteria and age limit (possibly up to age 80) may vary across major organizations Yearly lung cancer screening with a low dose CT scan (LDCT); talk with your healthcare provider   Obesity All women in this age group At yearly routine exams   Osteoporosis All women in this age group Routinely done every 2 years, but repeat as advised by your healthcare provider   Syphilis Women at increased risk for infection At routine exams; talk with your healthcare provider    Thyroid-stimulating hormone (TSH) Only women in this age group with symptoms of thyroid dysfunction Talk with your healthcare provider   Tuberculosis Women at increased risk for infection Talk with your healthcare provider   Vision All women in this age group Every 1 to 2 years; if you have a chronic health condition, ask your healthcare provider if you need exams more often   Vaccine Who needs it How often   Chickenpox (varicella) All women in this age group who have no record of this infection or vaccine 2 doses; second dose should be given at least 4 weeks after the first dose   Hepatitis A Women at increased risk for infection 2 or 3 doses (depending on the vaccine) given at least 6 months apart; talk with your healthcare provider   Hepatitis B Women at increased risk for infection 2 or 3 doses (depending on the vaccine); second dose should be given 1 month after the first dose; if a the third dose, it should be given at least 2 months after the second dose and at least 4 months after the first dose   Haemophilus influenza type B (HIB) Women at increased risk for infection 1 to 3 doses; talk with your healthcare provider   Influenza (flu) All women in this age group Once a year   Pneumococcal conjugate vaccine (PCV13) and pneumococcal polysaccharide vaccine (PPSV23) All women in this age group  PPSV 23: women who have not been vaccinated or have not had infection  PCV 13: women at increased risk for infection PPSV: 1 dose at age 65 or older  PCV 13: 1 dose at age 65 or older; talk with your healthcare provider   Tetanus/diphtheria/pertussis (Td/Tdap) booster All women in this age group Td every 10 years, or a 1-time dose of Tdap instead of a Td booster after age 18, then Td every 10 years   Zoster (shingles) All women in this age group 2 vaccines are available:    Recombinant zoster vaccine (RZV; Shigrix) is recommended as the preferred shingles vaccine. It's given in a series of 2 doses. The 2nd dose is given  2 to 6 months after the first. This is given even if you've had shingles before or had a previous zoster live vaccine.    Zoster live vaccine live (ZVL; Zostavax) may be given to healthy adults over age 60. It's given in one dose.   Counseling Who needs it How often   Diet and exercise Women who are overweight or obese When diagnosed, and then at routine exams   Fall prevention (exercise and vitamin D supplements) All women in this age group At yearly routine exams   Sexually transmitted infection prevention Women at increased risk for infection-talk with your healthcare provider At routine exams   Use of daily aspirin Women up to age 70 who are at high risk for cardiovascular problems and not at increased risk for bleeding as identified by your healthcare provider When your risk is known   Use of tobacco and the health effects it can cause All women in this age group Every exam   Rachid last reviewed this educational content on 7/1/2020 2000-2021 [employer]. All rights reserved. This information is not intended as a substitute for professional medical care. Always follow your healthcare professional's instructions.

## 2021-04-12 NOTE — PROGRESS NOTES
Floridalma is a 85 year old who is being evaluated via a billable telephone visit.      What phone number would you like to be contacted at? [phone]  How would you like to obtain your AVS? Kit    Assessment & Plan     (Z00.00) Encounter for wellness examination in adult  (primary encounter diagnosis)  Comment:   Plan:     (I10) Essential hypertension  Comment:   Plan: Comprehensive metabolic panel, Hemoglobin A1c        BP in adequate control. Although has been on low side . Sh may consider reducing dose of hydrochlorothiazide to see if helps  as her GFR  has been borderline low. encouraged home BP monitoring/ nurse vist   Discussed cares, low fat low salt  diet etc. Check labs, call pt with results.Follow up recheck  sooner if problem.       (E78.5) Hyperlipidemia LDL goal <130  Comment:   Plan: Lipid panel reflex to direct LDL Fasting            (R73.03) Prediabetes  Comment:   Plan: Hemoglobin A1c            (Z13.29) Screening for thyroid disorder  Comment:   Plan: TSH with free T4 reflex            (G60.9) Idiopathic peripheral neuropathy  Comment: per pt , mostly feet numbness comes and goes , 8 year - soles of feet - stable. no pain , no progression and staying stable   Plan: Comprehensive metabolic panel, Hemoglobin A1c,         Lipid panel reflex to direct LDL Fasting,         Vitamin B12, Folate        Discussed cares concerns check labs. She is comfortable watching will do f/u as needed     (M17.10) Arthritis of knee  Comment: seeing orthopedic   Plan:     (M79.18,  G89.29) Chronic gluteal pain  Comment: both buttock area on nad off, possible arhtritis related. She is  comfortable watching   Plan: Cares and symptomatic treatment discussed. She plans to do  follow up with ortho but will f/u here  if problem           Check labs. Cares and  treatment discussed.  follow up if problem       HCM issues discussed. Diet/ exercise discussed. Check labs , call patiens with results. follow up as needed.   Patient  "expressed understanding and agreement with treatment plan. All patient's questions were answered, will let me know if has more later.  Medications: Rx's: Reviewed the potential side effects/complications of medications prescribed.     Dinah Green MD  [facility] NACHO Hedrick is a 85 year old who presents for the following health issues     HPI     Annual Wellness Visit  Patient has been advised of split billing requirements and indicates understanding: Yes     Are you in the first 12 months of your Medicare Part B coverage?  No    Physical Health:    In general, how would you rate your overall physical health? good    Outside of work, how many days during the week do you exercise?6-7 days/week    Outside of work, approximately how many minutes a day do you exercise?30-45 minutes    If you drink alcohol do you typically have >3 drinks per day or >7 drinks per week? No    Do you usually eat at least 4 servings of fruit and vegetables a day, include whole grains & fiber and avoid regularly eating high fat or \"junk\" foods? NO    Do you have any problems taking medications regularly? No    Do you have any side effects from medications? none    Needs assistance for the following daily activities: no assistance needed    Which of the following safety concerns are present in your home?  none identified     Hearing impairment: No    In the past 6 months, have you been bothered by leaking of urine? yes    There were no vitals taken for this visit.  Weight: Unable to obtain due to telephone visit  Height: Unable to obtain due to telephone visit  BMI: Unable to obtain due to telephone visit  Blood Pressure: Unable to obtain due to telephone visit    Mental Health:    In general, how would you rate your overall mental or emotional health? excellent  PHQ-2 Score:      Do you feel safe in your environment? Yes    Have you ever done Advance Care Planning? (For example, a Health Directive, " POLST, or a discussion with a medical provider or your loved ones about your wishes)? No, advance care planning information given to patient to review.  Patient plans to discuss their wishes with loved ones or provider.      Fall risk:  Fallen 2 or more times in the past year?: No  Any fall with injury in the past year?: No  click delete button to remove this line now  Cognitive Screening: Unable to complete due to virtual visit; need for additional assessment in future face-to-face visit    Do you have sleep apnea, excessive snoring or daytime drowsiness?: no    Current providers sharing in care for this patient include:   Patient Care Team:  Dinah Green MD as PCP - General (Family Practice)  Dinah Green MD as Assigned PCP        Pre Diabetes Follow-up      How often are you checking your blood sugar? Not at all      What concerns do you have today about your diabetes? None     Do you have any of these symptoms? (Select all that apply)  Numbness in feet,  But no  Redness, sores, or blisters on feet, no Excessive thirst, Blurry vision, no Weight loss and Weight gain      Feet numbnes / arthritis in  knee        per pt ,Numbness in feet,- localized  mostlysoles of feet , numbness comes and goes ,has it for  8 year - stabel no pain , no progression and staying stable and not too concerned ,   Has hx of knee  Arthritis/ but back pain or radiating pain or numbness in legs . Has some sorness in buttock area  on nad off, sometimes  comfortbale watching ans she is seeing ortho.    her knees are bad possible arhtritis related  debating knee replacement     BP Readings from Last 2 Encounters:   02/16/21 96/50   04/04/19 134/70     LDL Cholesterol Calculated (mg/dL)   Date Value   07/07/2020 72   04/04/2019 93         Hyperlipidemia Follow-Up      Are you regularly taking any medication or supplement to lower your cholesterol?   Yes- see epic     Are you having muscle aches or other side effects that you  think could be caused by your cholesterol lowering medication?  No    Hypertension Follow-up      Do you check your blood pressure regularly outside of the clinic? Yes always lower side 110/ 70 no ddzziness etc she was told to dcrease dose butsh kept it same bu willing to lower the dose of hctx=z to lexis see if her BP saty o as her gfr/ has benndown a bit     Are you following a low salt diet? Yes    Are your blood pressures ever more than 140 on the top number (systolic) OR more   than 90 on the bottom number (diastolic), for example 140/90? No        Review of Systems   CONSTITUTIONAL: NEGATIVE for fever, chills, change in weight  INTEGUMENTARY/SKIN: NEGATIVE for worrisome rashes, moles or lesions  EYES: NEGATIVE for vision changes or irritation  ENT/MOUTH: NEGATIVE for ear, mouth and throat problems  RESP: NEGATIVE for significant cough or SOB  BREAST: NEGATIVE for masses, tenderness or discharge  CV: NEGATIVE for chest pain, palpitations or peripheral edema  GI: NEGATIVE for nausea, abdominal pain, heartburn, or change in bowel habits  : NEGATIVE for frequency, dysuria, or hematuria  MUSCULOSKELETAL:NEGATIVE for significant arthralgias or myalgia and both hip semtimes has buttock pain , no shooting apin r numbess d shooting seeing ortho   NEURO: NEGATIVE for weakness, dizziness or paresthesias  ENDOCRINE: NEGATIVE for temperature intolerance, skin/hair changes  HEME: NEGATIVE for bleeding problems  PSYCHIATRIC: NEGATIVE for changes in mood or affect      Objective           Vitals:  No vitals were obtained today due to virtual visit.    Physical Exam   healthy, alert and no distress  PSYCH: Alert and oriented times 3; coherent speech, normal   rate and volume, able to articulate logical thoughts, able   to abstract reason, no tangential thoughts, no hallucinations   or delusions  Her affect is normal  RESP: No cough, no audible wheezing, able to talk in full sentences  Remainder of exam unable to be completed  due to telephone visits          Phone call duration: 30  minutes

## 2021-05-18 ENCOUNTER — TELEPHONE (OUTPATIENT)
Dept: FAMILY MEDICINE | Facility: CLINIC | Age: 86
End: 2021-05-18

## 2021-05-18 NOTE — TELEPHONE ENCOUNTER
Glad that she is feeling ok although does she wants to come in today or tomorrow ? Just to be on safe side

## 2021-05-18 NOTE — TELEPHONE ENCOUNTER
Patient states that she does not have a ride for today or tomorrow. States that she does not drive and needs to take a shuttle to the clinic.  Patient states that she was really surprised to learn of the irregular heart beat. States that she feels perfectly fine. Agrees to call back with any new or worsening symptoms. Araseli Miranda RN

## 2021-05-18 NOTE — TELEPHONE ENCOUNTER
Pt calling she states they said she had an irregular heartrate on her EKG at the oral surgeon and should follow up . No distress with breathing or palpations per pt. Seeing you Thursday .  .Milly Bruno RN

## 2021-05-20 ENCOUNTER — OFFICE VISIT (OUTPATIENT)
Dept: FAMILY MEDICINE | Facility: CLINIC | Age: 86
End: 2021-05-20
Payer: COMMERCIAL

## 2021-05-20 VITALS
WEIGHT: 165.2 LBS | BODY MASS INDEX: 27.49 KG/M2 | OXYGEN SATURATION: 97 % | DIASTOLIC BLOOD PRESSURE: 60 MMHG | TEMPERATURE: 98.9 F | RESPIRATION RATE: 18 BRPM | SYSTOLIC BLOOD PRESSURE: 104 MMHG | HEART RATE: 84 BPM

## 2021-05-20 DIAGNOSIS — I49.3 FREQUENT PVCS: ICD-10-CM

## 2021-05-20 DIAGNOSIS — E78.5 HYPERLIPIDEMIA LDL GOAL <130: ICD-10-CM

## 2021-05-20 DIAGNOSIS — Z82.49 FAMILY HISTORY OF CARDIAC ARRHYTHMIA: ICD-10-CM

## 2021-05-20 DIAGNOSIS — I49.9 IRREGULAR HEART BEAT: Primary | ICD-10-CM

## 2021-05-20 DIAGNOSIS — I10 ESSENTIAL HYPERTENSION: ICD-10-CM

## 2021-05-20 DIAGNOSIS — G60.9 IDIOPATHIC PERIPHERAL NEUROPATHY: ICD-10-CM

## 2021-05-20 DIAGNOSIS — R73.03 PREDIABETES: ICD-10-CM

## 2021-05-20 DIAGNOSIS — Z13.29 SCREENING FOR THYROID DISORDER: ICD-10-CM

## 2021-05-20 LAB
ALBUMIN SERPL-MCNC: 3.7 G/DL (ref 3.4–5)
ALP SERPL-CCNC: 93 U/L (ref 40–150)
ALT SERPL W P-5'-P-CCNC: 30 U/L (ref 0–50)
ANION GAP SERPL CALCULATED.3IONS-SCNC: 4 MMOL/L (ref 3–14)
AST SERPL W P-5'-P-CCNC: 20 U/L (ref 0–45)
BILIRUB SERPL-MCNC: 0.9 MG/DL (ref 0.2–1.3)
BUN SERPL-MCNC: 19 MG/DL (ref 7–30)
CALCIUM SERPL-MCNC: 9.5 MG/DL (ref 8.5–10.1)
CHLORIDE SERPL-SCNC: 106 MMOL/L (ref 94–109)
CHOLEST SERPL-MCNC: 163 MG/DL
CO2 SERPL-SCNC: 30 MMOL/L (ref 20–32)
CREAT SERPL-MCNC: 0.8 MG/DL (ref 0.52–1.04)
ERYTHROCYTE [DISTWIDTH] IN BLOOD BY AUTOMATED COUNT: 16.5 % (ref 10–15)
FOLATE SERPL-MCNC: 45.5 NG/ML
GFR SERPL CREATININE-BSD FRML MDRD: 67 ML/MIN/{1.73_M2}
GLUCOSE SERPL-MCNC: 90 MG/DL (ref 70–99)
HBA1C MFR BLD: 5.3 % (ref 0–5.6)
HCT VFR BLD AUTO: 45.4 % (ref 35–47)
HDLC SERPL-MCNC: 67 MG/DL
HGB BLD-MCNC: 15.1 G/DL (ref 11.7–15.7)
LDLC SERPL CALC-MCNC: 74 MG/DL
MAGNESIUM SERPL-MCNC: 2.1 MG/DL (ref 1.6–2.3)
MCH RBC QN AUTO: 30.7 PG (ref 26.5–33)
MCHC RBC AUTO-ENTMCNC: 33.3 G/DL (ref 31.5–36.5)
MCV RBC AUTO: 92 FL (ref 78–100)
NONHDLC SERPL-MCNC: 96 MG/DL
PLATELET # BLD AUTO: 238 10E9/L (ref 150–450)
POTASSIUM SERPL-SCNC: 4.3 MMOL/L (ref 3.4–5.3)
PROT SERPL-MCNC: 7.2 G/DL (ref 6.8–8.8)
RBC # BLD AUTO: 4.92 10E12/L (ref 3.8–5.2)
SODIUM SERPL-SCNC: 140 MMOL/L (ref 133–144)
T4 FREE SERPL-MCNC: 1.14 NG/DL (ref 0.76–1.46)
TRIGL SERPL-MCNC: 112 MG/DL
TSH SERPL DL<=0.005 MIU/L-ACNC: 0.8 MU/L (ref 0.4–4)
TSH SERPL DL<=0.005 MIU/L-ACNC: 0.8 MU/L (ref 0.4–4)
VIT B12 SERPL-MCNC: 265 PG/ML (ref 193–986)
WBC # BLD AUTO: 13.9 10E9/L (ref 4–11)

## 2021-05-20 PROCEDURE — 93000 ELECTROCARDIOGRAM COMPLETE: CPT | Performed by: FAMILY MEDICINE

## 2021-05-20 PROCEDURE — 99214 OFFICE O/P EST MOD 30 MIN: CPT | Performed by: FAMILY MEDICINE

## 2021-05-20 PROCEDURE — 80061 LIPID PANEL: CPT | Performed by: FAMILY MEDICINE

## 2021-05-20 PROCEDURE — 36415 COLL VENOUS BLD VENIPUNCTURE: CPT | Performed by: FAMILY MEDICINE

## 2021-05-20 PROCEDURE — 83036 HEMOGLOBIN GLYCOSYLATED A1C: CPT | Performed by: FAMILY MEDICINE

## 2021-05-20 PROCEDURE — 82746 ASSAY OF FOLIC ACID SERUM: CPT | Performed by: FAMILY MEDICINE

## 2021-05-20 PROCEDURE — 84443 ASSAY THYROID STIM HORMONE: CPT | Performed by: FAMILY MEDICINE

## 2021-05-20 PROCEDURE — 82607 VITAMIN B-12: CPT | Performed by: FAMILY MEDICINE

## 2021-05-20 PROCEDURE — 84439 ASSAY OF FREE THYROXINE: CPT | Performed by: FAMILY MEDICINE

## 2021-05-20 PROCEDURE — 85027 COMPLETE CBC AUTOMATED: CPT | Performed by: FAMILY MEDICINE

## 2021-05-20 PROCEDURE — 83735 ASSAY OF MAGNESIUM: CPT | Performed by: FAMILY MEDICINE

## 2021-05-20 PROCEDURE — 80053 COMPREHEN METABOLIC PANEL: CPT | Performed by: FAMILY MEDICINE

## 2021-05-20 ASSESSMENT — PAIN SCALES - GENERAL: PAINLEVEL: NO PAIN (0)

## 2021-05-20 NOTE — PATIENT INSTRUCTIONS
"  Patient Education     About Arrhythmias    Electrical impulses cause the normal heart to beat 60 to 100 times a minute while at rest. These impulses come from a natural pacemaker called the sinus node. It is,  inside the right upper heart chamber. Electrical impulses travel throughout the upper heart chambers (the atria) before reaching the bottom muscle chambers ((the ventricles) through an electrical connection called the AV node. Each impulse causes the heart muscle to contract. This causes the blood to flow through the heart and out to the tissues and organs of your body.  An arrhythmia is a change from the normal speed or pattern of these electrical impulses. This can cause the heart to beat too fast (tachycardia), too slow (bradycardia), or in an unsteady pattern (irregular rhythm).  Symptoms of arrhythmias  Different people experience arrhythmias differently. And different arrhythmias can cause different symptoms. Sometimes you may not have symptoms, but just notice a change in your pulse. Symptoms can include:    Fluttering feeling in the chest    Shortness of breath    Chest pain or pressure    Neck fullness    Lightheadedness or dizziness    Fainting or almost fainting    Palpitations. This is the sense that your heart is fluttering or beating fast or hard or irregularly.    Tiredness, fatigue, or weakness    Cardiac arrest, and death, in serious arrhythmias  Causes of arrhythmias  Arrhythmias are most often caused by heart disease, such as:    Coronary artery disease (\"blocked arteries\")    Heart valve disease    Enlarged heart    High blood pressure    Heart failure  Other causes of arrhythmia include:    Certain medicines such as asthma inhalers and decongestants    Some herbal supplements    Cardiac stimulant drugs such as cocaine, amphetamine, and diet pills, and certain decongestant cold medicines, caffeine, and nicotine    Heavy use of alcohol    Anxiety and panic disorder    Thyroid " disease    Anemia    Diabetes    Sleep apnea    Obesity    Congenital heart disease    Cardiac genetic diseases    Electrolyte imbalance. Electrolytes are substances that help regulate normal heartbeat., High or low levels of certain electrolytes such as potassium or magnesium may affect the heartbeat and contribute to arrhythmia.  Arrhythmias can often be prevented. The cause and type of arrhythmia determines the best treatment. Sometimes your doctor may want to monitor your heart rate over a 24-hour period or longer. This can help find the cause of your arrhythmia and find the best treatment. This can be done with a Holter monitor. This is a portable electrocardiogram (ECG) recording device attached by wires to your chest. Or you may get an event monitor, which you can place over the skin in front of your heart to record heart rhythms. You can carry this with you as you go about your routine activities during the monitoring period. Implantable loop recorders may also be used to monitor the heart rhythm for up to 3 years. This miniature device is placed underneath the skin over the heart.  Home care  These guidelines will help you care for yourself at home:    Stay away from cardiac stimulants such as cocaine, amphetamine, diet pills, certain decongestant cold medicines, caffeine, and nicotine.    If you smoke, stop smoking. Contact your doctor or a local stop-smoking program for help.    Tell your doctor about any prescription, over-the-counter, or herbal medicines you take. These may be affecting your heart rhythm.  Follow-up care  Follow up with your healthcare provider, or as advised. If a Holter monitor has been recommended, contact the cardiologist you have been referred to as soon as you can  the device. Other outpatient tests may also be arranged for you at that time.  Call 911  This is the fastest and safest way to get to the emergency department. The paramedics can also start treatment on the way  "to the hospital, if needed.  Don't wait until your symptoms are severe to call 911. Other reasons to call 911 besides chest pain include:    Chest pain radiating to the shoulder, arm, neck, or back.    Shortness of breath    Feeling lightheaded, faint, or dizzy    Unexplained fainting    Rapid heart beat    Slower than usual heart rate compared to your normal    Very irregular heartbeat    Chest pain (angina) with weakness, dizziness, heavy sweating, nausea, or vomiting    Extreme drowsiness, or confusion    Weakness of an arm or leg or one side of the face    Trouble with speech or vision  When to seek medical advice  Remember, things are not always like they are on TV. Sometimes it is not so obvious. You may only feel weak or just \"not right.\" If it is not clear or if you have any doubt, call for advice.    Seek help for chest pain, or if something feels different from usual, even if your symptoms are mild.    Don't drive yourself. Have someone else drive. If no one can drive you, call 911.    If your doctor has given you medicines to take when you have symptoms, take them, but don't delay getting help while trying to find them.  Remote last reviewed this educational content on 11/1/2019 2000-2021 [employer]. All rights reserved. This information is not intended as a substitute for professional medical care. Always follow your healthcare professional's instructions.           "

## 2021-05-20 NOTE — PROGRESS NOTES
Assessment & Plan     (I49.9) Irregular heart beat  (primary encounter diagnosis)  Comment: at her dentist office ,  pt asymptomatic   Plan: Magnesium, EKG 12-lead complete w/read -         Clinics, CBC with platelets, TSH, T4, free,         CARDIOLOGY EVAL ADULT REFERRAL            (Z82.49) Family history of cardiac arrhythmia  Comment: sister - on pace maker   Plan:     (G60.9) Idiopathic peripheral neuropathy  Comment: per pt , mostly feet numbness comes and goes , 8 year - soles of feet - stable no pain , no progression and staying stable   Plan:     (Z13.29) Screening for thyroid disorder  Comment:   Plan:     (E78.5) Hyperlipidemia LDL goal <130  Comment:   Plan:     (I10) Essential hypertension  Comment:   Plan: Magnesium            (R73.03) Prediabetes  Comment:   Plan:     (I49.3) Frequent PVC's  Comment:   Plan: CARDIOLOGY EVAL ADULT REFERRAL      Her EKG shows   Sinus  Rhythm  -Frequent PVC -ventricular trigeminy   -Right bundle branch block.   No previous for comparison, so not sure if this could be all new , although clinically pt is asymptomatic.   She is awaiting dental procedure so it is good idea to have her se cardiology to further evaluate to make sure she is ok before the procedure   Discussed cares concern and Talked about warning S/S for which should be seen urgently   Check labs.Cares and  treatment discussed. She will  follow up if problem   Patient expressed understanding and agreement with treatment plan. All patient's questions were answered, will let me know if has more later.  Medications: Rx's: Reviewed the potential side effects/complications of medications prescribed.       Dinah Green MD  [facility] NACHO Hedrick is a 86 year old who presents for the following health issues     HPI     Concern - Irregular heart rate per oral surgeon   Onset: 05/182021 , was placed on monitor for procedure and she had irregular heart rhythm so he  cancelled the procedure and wanted her to get checked first . She does not thinks she had any symptoms at that time and does not recall and previous hx of irregular heart beat problem although never had an EKG done to he recall  Description: None   Intensity: moderate  Progression of Symptoms:  same  Accompanying Signs & Symptoms: None   Previous history of similar problem: No. and she has been feeling ok and has not noted any new sx of chest pain, palpitation, any more then usual new  sob , leg edema etc .  per pt has  Family history of cardiac arrhythmia and  her sister has pacemaker     Precipitating factors:        Worsened by: None   Alleviating factors:        Improved by: None   Therapies tried and outcome: None    prediabetes Follow-up      How often are you checking your blood sugar? Not at all, wants her labs to make sure it is ok she has been well control by diet     What concerns do you have today about your diabetes? None     Do you have any of these symptoms? (Select all that apply No redness, sores or blisters on feet.  No complaints of excessive thirst.  No reports of blurry vision.  No significant changes to weight.  Has hx of  Peripheral neuropathy - numbness  in feet, for a long time and no new changes or concerns      BP Readings from Last 2 Encounters:   05/20/21 104/60   02/16/21 96/50     Hemoglobin A1C (%)   Date Value   05/20/2021 5.3     LDL Cholesterol Calculated (mg/dL)   Date Value   05/20/2021 74   07/07/2020 72               Hyperlipidemia Follow-Up      Are you regularly taking any medication or supplement to lower your cholesterol?   Yes- see epic     Are you having muscle aches or other side effects that you think could be caused by your cholesterol lowering medication?  No    Hypertension Follow-up        Do you check your blood pressure regularly outside of the clinic? Not as regular but her BP is usually ok     Are you following a low salt diet? Yes    Are your blood pressures  ever more than 140 on the top number (systolic) OR more   than 90 on the bottom number (diastolic), for example 140/90? No        Review of Systems   Constitutional, HEENT, cardiovascular, pulmonary, GI, , musculoskeletal, neuro, skin, endocrine and psych systems are negative, except as otherwise noted.      Objective    There were no vitals taken for this visit.  There is no height or weight on file to calculate BMI.  Physical Exam   GENERAL: healthy, alert and no distress  HENT:  mouth without ulcers or lesions  NECK: no adenopathy, no asymmetry, masses, or scars and thyroid normal to palpation  RESP: lungs clear to auscultation - no rales, rhonchi or wheezes  CV: regular rates and rhythm, but has some possible PVC , normal S1 S2, no S3 or S4 and no peripheral edema  ABDOMEN: soft, nontender, no hepatosplenomegaly, no masses and bowel sounds normal  MS: no edema  NEURO: Normal strength and tone, mentation intact and speech normal  PSYCH: mentation appears normal, affect normal/bright    EKG - Reviewed and interpreted by me appears normal, NSR, Right Bundle Branch Block, multiple PVC noted, ventricular trigeminy , no prior tracings available

## 2021-05-21 DIAGNOSIS — I49.3 PVC'S (PREMATURE VENTRICULAR CONTRACTIONS): Primary | ICD-10-CM

## 2021-05-21 NOTE — PROGRESS NOTES
Chart prep for OV 6/1/2021: will check wit Dr. Antonio to see if imaging or monitor should be planned pre-visit. Patient has new RBBB and PVCs.

## 2021-05-21 NOTE — PROGRESS NOTES
Lexii Antonio MD Anderson, Barbara E, RN    Cc: LISANDRO Rosa [facility] Team 2   Caller: Unspecified (Today,  2:06 PM)               Agree. Echo and Holter.        Called patient to review recommendations. Pt is feeling overwhelmed, states she has never really had any serious health concerns and throughout her life has only been in the hospital for the births of her children. This is all so new for her and she would like to meet Dr Antonio first before pursuing any testing. She also mentions she does not drive and would have to coordinate a ride for these.

## 2021-06-01 ENCOUNTER — OFFICE VISIT (OUTPATIENT)
Dept: CARDIOLOGY | Facility: CLINIC | Age: 86
End: 2021-06-01
Attending: FAMILY MEDICINE
Payer: COMMERCIAL

## 2021-06-01 VITALS
BODY MASS INDEX: 26.99 KG/M2 | HEART RATE: 74 BPM | DIASTOLIC BLOOD PRESSURE: 80 MMHG | HEIGHT: 65 IN | SYSTOLIC BLOOD PRESSURE: 121 MMHG | OXYGEN SATURATION: 97 % | WEIGHT: 162 LBS

## 2021-06-01 DIAGNOSIS — I49.9 IRREGULAR HEART BEAT: Primary | ICD-10-CM

## 2021-06-01 DIAGNOSIS — I10 ESSENTIAL HYPERTENSION: ICD-10-CM

## 2021-06-01 DIAGNOSIS — I49.3 PVC'S (PREMATURE VENTRICULAR CONTRACTIONS): ICD-10-CM

## 2021-06-01 DIAGNOSIS — I45.10 RBBB (RIGHT BUNDLE BRANCH BLOCK): ICD-10-CM

## 2021-06-01 PROBLEM — E78.5 HYPERLIPIDEMIA LDL GOAL <130: Status: RESOLVED | Noted: 2019-04-04 | Resolved: 2021-06-01

## 2021-06-01 PROCEDURE — 93000 ELECTROCARDIOGRAM COMPLETE: CPT | Performed by: INTERNAL MEDICINE

## 2021-06-01 PROCEDURE — 99204 OFFICE O/P NEW MOD 45 MIN: CPT | Performed by: INTERNAL MEDICINE

## 2021-06-01 ASSESSMENT — MIFFLIN-ST. JEOR: SCORE: 1175.71

## 2021-06-01 NOTE — PROGRESS NOTES
"Service Date: 06/01/2021    PRIMARY CARE AND REFERRING PROVIDER:  Dinah Green MD    REASON FOR VISIT:  1.  Finding of \"irregular heartbeat on her EKG at the dentist's office.\"      HISTORY OF PRESENT ILLNESS:    Floridalma is a very pleasant 86-year-old  lady, accompanied by her daughter, Selina.  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    A few days ago, she went to her dentist to have her wisdom tooth removed.  EKG was reportedly done and the dentist advised her that he was not going to proceed because of an \"irregular heartbeat\" and advised that she follow up with her daughter regarding additional testing.    The patient had an EKG at Dr. Green's office on 05/20/2021.  I personally reviewed it.  It shows sinus rhythm with right bundle-branch block conduction and frequent monomorphic premature ventricular complexes.    In comparison, her ECG today shows normal sinus rhythm with a right bundle-branch block and no PVCs.    The patient has no cardiovascular symptoms.  She does chair exercises daily, swims once a week and does not have any symptoms of palpitations, dyspnea or chest pain.  She does not remember but had a pharmacological stress test 10 years ago which was negative for ischemia.  No previous ECG for comparison.  Not known to have atrial fibrillation, rarely drinks caffeine, rare alcohol.    LABS:  normal lipid panel, normal renal panel, normal CBC, normal A1c, normal TSH.    PHYSICAL EXAMINATION:    VITAL SIGNS:  /80, pulse 74 per minute.  BMI 27 kg/m2.  CARDIOVASCULAR:  Normal heart sounds, no audible murmur.  Apical impulse not palpable due to body habitus.  No parasternal heave or thrill.  No vascular bruit.  LUNGS:  Clear to auscultation.    Detailed exam attached to this note.    DIAGNOSES:    1.  Irregular heartbeat.  2.  Right bundle branch block.  3.  Treated benign essential hypertension.    ASSESSMENT:    Floridalma does not have symptoms of palpitations or any " other cardiac symptoms, blood pressure is well controlled, no history of previous arrhythmias.  I am not sure what was seen on her dentist EKG as I do not have access to the tracing.  The concern, of course, is if she has paroxysmal atrial fibrillation.  It is certainly possible that what was seen were frequent PVCs, which can be a normal phenomenon.  I think we need formal testing with a heart monitor and review.    PLAN:    1.  7-day heart monitor.  2.  Follow up video visit to discuss results.  3.  No new medications prescribed today.    Lexii Antonio MD        D: 2021   T: 2021   MT: [email or web address]    Name:     YUNG ORO  MRN:      [id number]        Account:      [id number]   :      1935           Service Date: 2021       Document: [id number]

## 2021-06-01 NOTE — LETTER
6/1/2021    Dinah Green MD  [address]  [place] [address]    RE: Floridalma Cunningham       Dear Colleague,    I had the pleasure of seeing Floridalma Cunningham in the [facility].    Clinic visit note dictated. Dictation reference number - [id number]        REVIEW OF SYSTEMS:  A comprehensive 10-point review of systems was completed and the pertinent positives are documented in the history of present illness.    Skin:  Negative     Eyes:  Positive for glasses  ENT:  Negative    Respiratory:  Negative    Cardiovascular:  Negative    Gastroenterology: Negative    Genitourinary:  Negative    Musculoskeletal:  Positive for arthritis  Neurologic:  Positive for numbness or tingling of feet  Psychiatric:  Positive for excessive stress  Heme/Lymph/Imm:  Negative    Endocrine:  Negative      CURRENT MEDICATIONS:  Current Outpatient Medications   Medication Sig Dispense Refill     atorvastatin (LIPITOR) 10 MG tablet Take 1 tablet (10 mg) by mouth daily 90 tablet 1     Co-Enzyme Q-10 100 MG CAPS        CRANBERRY PO Take 1 tablet by mouth daily       hydrochlorothiazide (HYDRODIURIL) 25 MG tablet Take 1 tablet (25 mg) by mouth daily 90 tablet 1     lisinopril (ZESTRIL) 20 MG tablet Take 1 tablet (20 mg) by mouth daily 90 tablet 1     multivitamin w/minerals (MULTI-VITAMIN) tablet Take 1 tablet by mouth daily       Omega-3 Fatty Acids (FISH OIL PO)        trimethoprim (TRIMPEX) 100 MG tablet Take 1 tablet (100 mg) by mouth daily 90 tablet 1         ALLERGIES:  Allergies   Allergen Reactions     Sulfa Drugs        PAST MEDICAL HISTORY:    Past Medical History:   Diagnosis Date     Hyperlipidemia      Hypertension      PVC's (premature ventricular contractions)      RBBB (right bundle branch block)        PAST SURGICAL HISTORY:    History reviewed. No pertinent surgical history.    FAMILY HISTORY:    Family History   Problem Relation Age of Onset     Coronary  "Artery Disease Father          of MI AT AGE 59      Bladder Cancer Brother      Irregular heart beat Mother        SOCIAL HISTORY:    Social History     Socioeconomic History     Marital status:      Spouse name: None     Number of children: None     Years of education: None     Highest education level: None   Occupational History     None   Social Needs     Financial resource strain: None     Food insecurity     Worry: None     Inability: None     Transportation needs     Medical: None     Non-medical: None   Tobacco Use     Smoking status: Former Smoker     Quit date: 1945     Years since quittin.0     Smokeless tobacco: Never Used     Tobacco comment: smoked for two years   Substance and Sexual Activity     Alcohol use: Yes     Frequency: Monthly or less     Comment: rarely     Drug use: No     Sexual activity: Not Currently   Lifestyle     Physical activity     Days per week: None     Minutes per session: None     Stress: None   Relationships     Social connections     Talks on phone: None     Gets together: None     Attends Mandaen service: None     Active member of club or organization: None     Attends meetings of clubs or organizations: None     Relationship status: None     Intimate partner violence     Fear of current or ex partner: None     Emotionally abused: None     Physically abused: None     Forced sexual activity: None   Other Topics Concern     Parent/sibling w/ CABG, MI or angioplasty before 65F 55M? Not Asked   Social History Narrative     None       PHYSICAL EXAM:    Vitals: /80   Pulse 74   Ht 1.651 m (5' 5\")   Wt 73.5 kg (162 lb)   SpO2 97%   BMI 26.96 kg/m    Wt Readings from Last 5 Encounters:   21 73.5 kg (162 lb)   21 74.9 kg (165 lb 3.2 oz)   21 76.2 kg (168 lb)   19 81.2 kg (179 lb)   18 83.5 kg (184 lb 1.4 oz)       Constitutional: Comfortable at rest. Cooperative, alert and oriented, well developed, well nourished.  Eyes: " Pupils equal and round, conjunctivae and lids unremarkable, sclera white, no xanthalasma,   ENT: Satisfactory dentition.  No cyanosis or pallor.  Neck: Carotid pulses are full and equal bilaterally, no carotid bruit, no thyromegaly    Respiratory: Normal respiratory effort with symmetrical chest wall movements and no use of accessory muscles. Good air entry with normal breath sounds and no rales or wheeze.  Cardiovascular: Normal jugular venous pulse and pressure.  Normal carotid pulse character and volume.  No carotid bruit.  No parasternal heave or thrill.  Heart sounds are normal and regular. No audible murmur. No S3, S4 or friction rub.    GI: Soft, nontender, no hepatosplenomegaly, no masses, active bowel sounds.    Skin: No rash, erythema, ecchymosis.  Musculoskeletal: Normal muscle tone and strength. Normal gait. No spinal deformities.  Neuropsychiatric: Oriented to time place and person.  Affect normal.  No gross motor deficits.  Extremities: No edema. No clubbing or deformities.        Encounter Diagnoses   Name Primary?     Irregular heart beat Yes     PVC's (premature ventricular contractions)      RBBB (right bundle branch block)      Essential hypertension        Orders Placed This Encounter   Procedures     Follow-Up with Cardiologist     Leadless EKG Monitor 3 to 7 Days     Thank you for allowing me to participate in the care of your patient.      Sincerely,     Lexii Antonio MD     [facility]    cc:   Dinah Green MD  [address] [place] [address] DR GRIFFITH [facility] [place] [address]

## 2021-06-01 NOTE — PROGRESS NOTES
Clinic visit note dictated. Dictation reference number - [id number]        REVIEW OF SYSTEMS:  A comprehensive 10-point review of systems was completed and the pertinent positives are documented in the history of present illness.    Skin:  Negative     Eyes:  Positive for glasses  ENT:  Negative    Respiratory:  Negative    Cardiovascular:  Negative    Gastroenterology: Negative    Genitourinary:  Negative    Musculoskeletal:  Positive for arthritis  Neurologic:  Positive for numbness or tingling of feet  Psychiatric:  Positive for excessive stress  Heme/Lymph/Imm:  Negative    Endocrine:  Negative      CURRENT MEDICATIONS:  Current Outpatient Medications   Medication Sig Dispense Refill     atorvastatin (LIPITOR) 10 MG tablet Take 1 tablet (10 mg) by mouth daily 90 tablet 1     Co-Enzyme Q-10 100 MG CAPS        CRANBERRY PO Take 1 tablet by mouth daily       hydrochlorothiazide (HYDRODIURIL) 25 MG tablet Take 1 tablet (25 mg) by mouth daily 90 tablet 1     lisinopril (ZESTRIL) 20 MG tablet Take 1 tablet (20 mg) by mouth daily 90 tablet 1     multivitamin w/minerals (MULTI-VITAMIN) tablet Take 1 tablet by mouth daily       Omega-3 Fatty Acids (FISH OIL PO)        trimethoprim (TRIMPEX) 100 MG tablet Take 1 tablet (100 mg) by mouth daily 90 tablet 1         ALLERGIES:  Allergies   Allergen Reactions     Sulfa Drugs        PAST MEDICAL HISTORY:    Past Medical History:   Diagnosis Date     Hyperlipidemia      Hypertension      PVC's (premature ventricular contractions)      RBBB (right bundle branch block)        PAST SURGICAL HISTORY:    History reviewed. No pertinent surgical history.    FAMILY HISTORY:    Family History   Problem Relation Age of Onset     Coronary Artery Disease Father          of MI AT AGE 59      Bladder Cancer Brother      Irregular heart beat Mother        SOCIAL HISTORY:    Social History     Socioeconomic History     Marital status:      Spouse name: None     Number of children: None  "    Years of education: None     Highest education level: None   Occupational History     None   Social Needs     Financial resource strain: None     Food insecurity     Worry: None     Inability: None     Transportation needs     Medical: None     Non-medical: None   Tobacco Use     Smoking status: Former Smoker     Quit date: 1945     Years since quittin.0     Smokeless tobacco: Never Used     Tobacco comment: smoked for two years   Substance and Sexual Activity     Alcohol use: Yes     Frequency: Monthly or less     Comment: rarely     Drug use: No     Sexual activity: Not Currently   Lifestyle     Physical activity     Days per week: None     Minutes per session: None     Stress: None   Relationships     Social connections     Talks on phone: None     Gets together: None     Attends Restorationism service: None     Active member of club or organization: None     Attends meetings of clubs or organizations: None     Relationship status: None     Intimate partner violence     Fear of current or ex partner: None     Emotionally abused: None     Physically abused: None     Forced sexual activity: None   Other Topics Concern     Parent/sibling w/ CABG, MI or angioplasty before 65F 55M? Not Asked   Social History Narrative     None       PHYSICAL EXAM:    Vitals: /80   Pulse 74   Ht 1.651 m (5' 5\")   Wt 73.5 kg (162 lb)   SpO2 97%   BMI 26.96 kg/m    Wt Readings from Last 5 Encounters:   21 73.5 kg (162 lb)   21 74.9 kg (165 lb 3.2 oz)   21 76.2 kg (168 lb)   19 81.2 kg (179 lb)   18 83.5 kg (184 lb 1.4 oz)       Constitutional: Comfortable at rest. Cooperative, alert and oriented, well developed, well nourished.  Eyes: Pupils equal and round, conjunctivae and lids unremarkable, sclera white, no xanthalasma,   ENT: Satisfactory dentition.  No cyanosis or pallor.  Neck: Carotid pulses are full and equal bilaterally, no carotid bruit, no thyromegaly    Respiratory: Normal " respiratory effort with symmetrical chest wall movements and no use of accessory muscles. Good air entry with normal breath sounds and no rales or wheeze.  Cardiovascular: Normal jugular venous pulse and pressure.  Normal carotid pulse character and volume.  No carotid bruit.  No parasternal heave or thrill.  Heart sounds are normal and regular. No audible murmur. No S3, S4 or friction rub.    GI: Soft, nontender, no hepatosplenomegaly, no masses, active bowel sounds.    Skin: No rash, erythema, ecchymosis.  Musculoskeletal: Normal muscle tone and strength. Normal gait. No spinal deformities.  Neuropsychiatric: Oriented to time place and person.  Affect normal.  No gross motor deficits.  Extremities: No edema. No clubbing or deformities.        Encounter Diagnoses   Name Primary?     Irregular heart beat Yes     PVC's (premature ventricular contractions)      RBBB (right bundle branch block)      Essential hypertension        Orders Placed This Encounter   Procedures     Follow-Up with Cardiologist     Leadless EKG Monitor 3 to 7 Days

## 2021-06-01 NOTE — PATIENT INSTRUCTIONS
1. 7 day monitor.    2. Follow up video visit with Dr. Antonio to discuss results.    If you have any questions or concerns, please contact my nurses at [phone].

## 2021-06-18 ENCOUNTER — HOSPITAL ENCOUNTER (OUTPATIENT)
Dept: CARDIOLOGY | Facility: CLINIC | Age: 86
Discharge: HOME | End: 2021-06-18
Attending: INTERNAL MEDICINE | Admitting: INTERNAL MEDICINE
Payer: COMMERCIAL

## 2021-06-18 DIAGNOSIS — I45.10 RBBB (RIGHT BUNDLE BRANCH BLOCK): ICD-10-CM

## 2021-06-18 DIAGNOSIS — I49.9 IRREGULAR HEART BEAT: ICD-10-CM

## 2021-06-18 DIAGNOSIS — I10 ESSENTIAL HYPERTENSION: ICD-10-CM

## 2021-06-18 DIAGNOSIS — I49.3 PVC'S (PREMATURE VENTRICULAR CONTRACTIONS): ICD-10-CM

## 2021-06-18 PROCEDURE — 93244 EXT ECG>48HR<7D REV&INTERPJ: CPT | Performed by: INTERNAL MEDICINE

## 2021-06-18 PROCEDURE — 93242 EXT ECG>48HR<7D RECORDING: CPT

## 2021-07-16 ENCOUNTER — TELEPHONE (OUTPATIENT)
Dept: CARDIOLOGY | Facility: CLINIC | Age: 86
End: 2021-07-16

## 2021-07-16 DIAGNOSIS — I45.10 RBBB (RIGHT BUNDLE BRANCH BLOCK): ICD-10-CM

## 2021-07-16 DIAGNOSIS — I10 ESSENTIAL HYPERTENSION: Primary | ICD-10-CM

## 2021-07-16 DIAGNOSIS — I49.3 PVC'S (PREMATURE VENTRICULAR CONTRACTIONS): ICD-10-CM

## 2021-07-16 NOTE — TELEPHONE ENCOUNTER
Per  Dr. Mixon's reply Patient called. Monitor results reviewed. Many questions.  Patient reluctantly agrees with echocardiogram next week. Prior to MAVERICK visit. Order entered.   Spoke with scheduling.     Patient is hoping for knee surgery in about 2 weeks.

## 2021-07-16 NOTE — TELEPHONE ENCOUNTER
F/Up visit with Michael 7-28-21.     Leadless monitor 6-17-21 to 6-22-21 - see report.   Predominant rhythm Sinus. Average HR 87 bpm  - 9 runs VT, average rate 167 bpm, fastest run was 4 beats and longest run was 5 beats.  -14 runs SVT, average rate 207 bpm, fastest run was 5 beats and longest run was 7 beats.17.8% PVC burden.    New Patient visit with Dr. Antonio 6-1-21 for irregular heart beats and RBBB.  Floridalma does not have symptoms of palpitations or any other cardiac symptoms, blood pressure is well controlled, no history of previous arrhythmias.  I am not sure what was seen on her dentist EKG as I do not have access to the tracing.  The concern, of course, is if she has paroxysmal atrial fibrillation.  It is certainly possible that what was seen were frequent PVCs, which can be a normal phenomenon.  I think we need formal testing with a heart monitor and review.      Will message cardiologist and MAVERICK.

## 2021-07-26 DIAGNOSIS — Z11.59 ENCOUNTER FOR SCREENING FOR OTHER VIRAL DISEASES: ICD-10-CM

## 2021-08-03 ENCOUNTER — HOSPITAL ENCOUNTER (OUTPATIENT)
Dept: CARDIOLOGY | Facility: CLINIC | Age: 86
Discharge: HOME | End: 2021-08-03
Attending: INTERNAL MEDICINE | Admitting: INTERNAL MEDICINE
Payer: COMMERCIAL

## 2021-08-03 DIAGNOSIS — I49.3 PVC'S (PREMATURE VENTRICULAR CONTRACTIONS): ICD-10-CM

## 2021-08-03 DIAGNOSIS — I45.10 RBBB (RIGHT BUNDLE BRANCH BLOCK): ICD-10-CM

## 2021-08-03 DIAGNOSIS — I10 ESSENTIAL HYPERTENSION: ICD-10-CM

## 2021-08-03 LAB — LVEF ECHO: NORMAL

## 2021-08-03 PROCEDURE — 999N000208 ECHOCARDIOGRAM COMPLETE

## 2021-08-03 PROCEDURE — 255N000002 HC RX 255 OP 636: Performed by: INTERNAL MEDICINE

## 2021-08-03 PROCEDURE — 93306 TTE W/DOPPLER COMPLETE: CPT | Mod: 26 | Performed by: INTERNAL MEDICINE

## 2021-08-03 RX ADMIN — HUMAN ALBUMIN MICROSPHERES AND PERFLUTREN 9 ML: 10; .22 INJECTION, SOLUTION INTRAVENOUS at 13:37

## 2021-08-06 ENCOUNTER — VIRTUAL VISIT (OUTPATIENT)
Dept: CARDIOLOGY | Facility: CLINIC | Age: 86
End: 2021-08-06
Attending: INTERNAL MEDICINE
Payer: COMMERCIAL

## 2021-08-06 DIAGNOSIS — I45.10 RBBB (RIGHT BUNDLE BRANCH BLOCK): ICD-10-CM

## 2021-08-06 DIAGNOSIS — I10 ESSENTIAL HYPERTENSION: ICD-10-CM

## 2021-08-06 DIAGNOSIS — I49.3 PVC'S (PREMATURE VENTRICULAR CONTRACTIONS): Primary | ICD-10-CM

## 2021-08-06 PROCEDURE — 99212 OFFICE O/P EST SF 10 MIN: CPT | Mod: 95 | Performed by: PHYSICIAN ASSISTANT

## 2021-08-06 NOTE — PROGRESS NOTES
Floridalma is a 86 year old who is being evaluated via a billable video visit.      How would you like to obtain your AVS? MyChart  If the video visit is dropped, the invitation should be resent by: Text to cell phone: [phone]  Will anyone else be joining your video visit? No          Review Of Systems  Skin: NEGATIVE  Eyes:Ears/Nose/Throat: NEGATIVE  Respiratory: NEGATIVE  Cardiovascular:NEGATIVE  Gastrointestinal: NEGATIVE  Genitourinary:NEGATIVE   Musculoskeletal: NEGATIVE  Neurologic: NEGATIVE  Psychiatric: NEGATIVE  Hematologic/Lymphatic/Immunologic: NEGATIVE  Endocrine:  NEGATIVE    -----------------------------------------------------------------------------------------------------------    Primary Cardiologist: Dr. Antonio    Reason for Phone Visit: Review echo results    HPI:  Floridalma is a pleasant 86 year old female with past medical history notable for PVC, HTN, RBBB, and osteoarthritis.     She was at a dental office in 6/2021 and was noted to have frequent skipped beats. ECG in PCP clinic showed frequent PVCs's. She was referred to Dr. Antonio who ordered a 7-day ZIOpatch monitor. This showed baseline rhythm to be NSR. She had intermittent SVT, and frequent PVC (burden of 18%). The results were reviewed by Dr. Mixon in the absence of Dr. Antonio who recommended obtaining echocardiogram. This showed preserved biventricular function with no valve disease. Of note, she had complete blood work at the time of her dental visit. She had normal thyroid function and had no evidence of electrolyte imbalances.     Floridalma reports no symptoms whatsoever. She is not interested in taking more medications. She is happy to hear her echocardiogram was normal.    A/P:   Floridalma is a pleasant 86 year old female with past medical history notable for PVC, HTN, RBBB, and osteoarthritis.    She appears to be doing well with no symptoms. She is not interested in starting metoprolol which I think is reasonable as she is feeling well and her heart  structure per echo is normal. We will not make any additional changes at this time. She will return to cardiology clinic as needed in the future. She is happy with this plan.    Call Duration: 5 minutes    This visit is being conducted as a virtual visit due to the emphasis on mitigation of the COVID-19 virus pandemic. The clinician has decided that the risk of an in-office visit outweighs the benefit for this patient.     Michael Patterson PA-C   8/6/2021  Pager: [phone]

## 2021-08-06 NOTE — LETTER
8/6/2021    Dinah Green MD  [address]  [place] [address]    RE: Floridalma Cunningham       Dear Colleague,    I had the pleasure of seeing Floridalma Cunningham in the [facility].    Floridalma is a 86 year old who is being evaluated via a billable video visit.      How would you like to obtain your AVS? MyChart  If the video visit is dropped, the invitation should be resent by: Text to cell phone: [phone]  Will anyone else be joining your video visit? No          Review Of Systems  Skin: NEGATIVE  Eyes:Ears/Nose/Throat: NEGATIVE  Respiratory: NEGATIVE  Cardiovascular:NEGATIVE  Gastrointestinal: NEGATIVE  Genitourinary:NEGATIVE   Musculoskeletal: NEGATIVE  Neurologic: NEGATIVE  Psychiatric: NEGATIVE  Hematologic/Lymphatic/Immunologic: NEGATIVE  Endocrine:  NEGATIVE    -----------------------------------------------------------------------------------------------------------    Primary Cardiologist: Dr. Antonio    Reason for Phone Visit: Review echo results    HPI:  Floridalma is a pleasant 86 year old female with past medical history notable for PVC, HTN, RBBB, and osteoarthritis.     She was at a dental office in 6/2021 and was noted to have frequent skipped beats. ECG in PCP clinic showed frequent PVCs's. She was referred to Dr. Antonio who ordered a 7-day ZIOpatch monitor. This showed baseline rhythm to be NSR. She had intermittent SVT, and frequent PVC (burden of 18%). The results were reviewed by Dr. Mixon in the absence of Dr. Antonio who recommended obtaining echocardiogram. This showed preserved biventricular function with no valve disease. Of note, she had complete blood work at the time of her dental visit. She had normal thyroid function and had no evidence of electrolyte imbalances.     Floridalma reports no symptoms whatsoever. She is not interested in taking more medications. She is happy to hear her echocardiogram was normal.    A/P:   Floridalma is a pleasant 86  year old female with past medical history notable for PVC, HTN, RBBB, and osteoarthritis.    She appears to be doing well with no symptoms. She is not interested in starting metoprolol which I think is reasonable as she is feeling well and her heart structure per echo is normal. We will not make any additional changes at this time. She will return to cardiology clinic as needed in the future. She is happy with this plan.    Call Duration: 5 minutes    This visit is being conducted as a virtual visit due to the emphasis on mitigation of the COVID-19 virus pandemic. The clinician has decided that the risk of an in-office visit outweighs the benefit for this patient.     Michael Patterson PA-C   8/6/2021  Pager: [phone]          Thank you for allowing me to participate in the care of your patient.      Sincerely,     Michael Patterson PA-C     [facility]  cc:   Lexii Antonio MD  [address] [place] [phone]

## 2021-08-09 ENCOUNTER — OFFICE VISIT (OUTPATIENT)
Dept: FAMILY MEDICINE | Facility: CLINIC | Age: 86
End: 2021-08-09
Payer: COMMERCIAL

## 2021-08-09 ENCOUNTER — LAB (OUTPATIENT)
Dept: URGENT CARE | Facility: URGENT CARE | Age: 86
End: 2021-08-09
Attending: ORTHOPAEDIC SURGERY
Payer: COMMERCIAL

## 2021-08-09 VITALS
RESPIRATION RATE: 18 BRPM | HEART RATE: 107 BPM | TEMPERATURE: 98.6 F | OXYGEN SATURATION: 94 % | BODY MASS INDEX: 27.99 KG/M2 | SYSTOLIC BLOOD PRESSURE: 118 MMHG | HEIGHT: 65 IN | DIASTOLIC BLOOD PRESSURE: 66 MMHG | WEIGHT: 168 LBS

## 2021-08-09 DIAGNOSIS — I49.9 IRREGULAR HEART BEAT: ICD-10-CM

## 2021-08-09 DIAGNOSIS — Z01.818 PREOPERATIVE EXAMINATION: Primary | ICD-10-CM

## 2021-08-09 DIAGNOSIS — M17.10 ARTHRITIS OF KNEE: ICD-10-CM

## 2021-08-09 DIAGNOSIS — Z11.59 ENCOUNTER FOR SCREENING FOR OTHER VIRAL DISEASES: ICD-10-CM

## 2021-08-09 DIAGNOSIS — N18.30 STAGE 3 CHRONIC KIDNEY DISEASE, UNSPECIFIED WHETHER STAGE 3A OR 3B CKD (H): ICD-10-CM

## 2021-08-09 DIAGNOSIS — I10 ESSENTIAL HYPERTENSION: ICD-10-CM

## 2021-08-09 LAB
ERYTHROCYTE [DISTWIDTH] IN BLOOD BY AUTOMATED COUNT: 14.6 % (ref 10–15)
HCT VFR BLD AUTO: 43.7 % (ref 35–47)
HGB BLD-MCNC: 15 G/DL (ref 11.7–15.7)
MCH RBC QN AUTO: 30.8 PG (ref 26.5–33)
MCHC RBC AUTO-ENTMCNC: 34.3 G/DL (ref 31.5–36.5)
MCV RBC AUTO: 90 FL (ref 78–100)
PLATELET # BLD AUTO: 274 10E3/UL (ref 150–450)
RBC # BLD AUTO: 4.87 10E6/UL (ref 3.8–5.2)
SARS-COV-2 RNA RESP QL NAA+PROBE: NEGATIVE
WBC # BLD AUTO: 12.2 10E3/UL (ref 4–11)

## 2021-08-09 PROCEDURE — U0003 INFECTIOUS AGENT DETECTION BY NUCLEIC ACID (DNA OR RNA); SEVERE ACUTE RESPIRATORY SYNDROME CORONAVIRUS 2 (SARS-COV-2) (CORONAVIRUS DISEASE [COVID-19]), AMPLIFIED PROBE TECHNIQUE, MAKING USE OF HIGH THROUGHPUT TECHNOLOGIES AS DESCRIBED BY CMS-2020-01-R: HCPCS

## 2021-08-09 PROCEDURE — 99214 OFFICE O/P EST MOD 30 MIN: CPT | Performed by: FAMILY MEDICINE

## 2021-08-09 PROCEDURE — 85027 COMPLETE CBC AUTOMATED: CPT | Performed by: FAMILY MEDICINE

## 2021-08-09 PROCEDURE — 36415 COLL VENOUS BLD VENIPUNCTURE: CPT | Performed by: FAMILY MEDICINE

## 2021-08-09 PROCEDURE — 80048 BASIC METABOLIC PNL TOTAL CA: CPT | Performed by: FAMILY MEDICINE

## 2021-08-09 PROCEDURE — U0005 INFEC AGEN DETEC AMPLI PROBE: HCPCS

## 2021-08-09 ASSESSMENT — PAIN SCALES - GENERAL: PAINLEVEL: MODERATE PAIN (4)

## 2021-08-09 ASSESSMENT — MIFFLIN-ST. JEOR: SCORE: 1202.92

## 2021-08-09 NOTE — H&P (VIEW-ONLY)
[address] [address]  Phone: [phone]  Primary Provider: Dinah Green        PREOPERATIVE EVALUATION:  Today's date: 8/9/2021    Floridalma Cunningham is a 86 year old female who presents for a preoperative evaluation.    Surgical Information:  Surgery/Procedure: Right Total Knee Athroplasty  Surgery Location: [facility]  Surgeon: Dr. Awan  Surgery Date: 08/11/2021  Time of Surgery: 7:30am  Where patient plans to recover: At a TCU (Transitional Care Unit)  Fax number for surgical facility: Note does not need to be faxed, will be available electronically in Epic.    Type of Anesthesia Anticipated: General    Assessment & Plan     The proposed surgical procedure is considered INTERMEDIATE risk.    Preoperative examination    - CBC with platelets; Future  - Basic metabolic panel  (Ca, Cl, CO2, Creat, Gluc, K, Na, BUN); Future    Irregular heart beat    - CBC with platelets; Future  - Basic metabolic panel  (Ca, Cl, CO2, Creat, Gluc, K, Na, BUN); Future    Essential hypertension    - CBC with platelets; Future  - Basic metabolic panel  (Ca, Cl, CO2, Creat, Gluc, K, Na, BUN); Future    Arthritis of knee    - CBC with platelets; Future  - Basic metabolic panel  (Ca, Cl, CO2, Creat, Gluc, K, Na, BUN); Future         Risks and Recommendations:  The patient has the following additional risks and recommendations for perioperative complications:  Cardiovascular:   - Cardiology consulted, and did echocardiogram for PVC's and RBBB    Medication Instructions:   - aspirin: Discontinue aspirin 7-10 days prior to procedure to reduce bleeding risk. It should be resumed postoperatively.     RECOMMENDATION:  APPROVAL GIVEN to proceed with proposed procedure, without further diagnostic evaluation.          Subjective       Preop Questions 8/9/2021   1. Have you ever had a heart attack or stroke? No   2. Have you ever had surgery on your heart  or blood vessels, such as a stent placement, a coronary artery bypass, or surgery on an artery in your head, neck, heart, or legs? No   3. Do you have chest pain with activity? No   4. Do you have a history of  heart failure? No   5. Do you currently have a cold, bronchitis or symptoms of other infection? No   6. Do you have a cough, shortness of breath, or wheezing? No   7. Do you or anyone in your family have previous history of blood clots? No   8. Do you or does anyone in your family have a serious bleeding problem such as prolonged bleeding following surgeries or cuts? No   9. Have you ever had problems with anemia or been told to take iron pills? No   10. Have you had any abnormal blood loss such as black, tarry or bloody stools, or abnormal vaginal bleeding? No   11. Have you ever had a blood transfusion? No   12. Are you willing to have a blood transfusion if it is medically needed before, during, or after your surgery? Yes   13. Have you or any of your relatives ever had problems with anesthesia? No   14. Do you have sleep apnea, excessive snoring or daytime drowsiness? No   15. Do you have any artifical heart valves or other implanted medical devices like a pacemaker, defibrillator, or continuous glucose monitor? No   16. Do you have artificial joints? No   17. Are you allergic to latex? No       Health Care Directive:  Patient does not have a Health Care Directive or Living Will: Discussed advance care planning with patient; however, patient declined at this time.    Preoperative Review of :   reviewed - no record of controlled substances prescribed.          Review of Systems  CONSTITUTIONAL: NEGATIVE for fever, chills, change in weight  ENT/MOUTH: NEGATIVE for ear, mouth and throat problems  RESP: NEGATIVE for significant cough or SOB  CV: NEGATIVE for chest pain, palpitations or peripheral edema    Patient Active Problem List    Diagnosis Date Noted     RBBB (right bundle branch block)       Priority: Medium     PVC's (premature ventricular contractions)      Priority: Medium     Essential hypertension 2019     Priority: Medium     Asymptomatic postmenopausal status 2019     Priority: Medium     Arthritis of knee 2019     Priority: Medium     Knees  mostly, seeing orthopedic         Past Medical History:   Diagnosis Date     Hyperlipidemia      Hypertension      PVC's (premature ventricular contractions)      RBBB (right bundle branch block)      No past surgical history on file.  Current Outpatient Medications   Medication Sig Dispense Refill     atorvastatin (LIPITOR) 10 MG tablet Take 1 tablet (10 mg) by mouth daily 90 tablet 1     Co-Enzyme Q-10 100 MG CAPS        CRANBERRY PO Take 1 tablet by mouth daily       hydrochlorothiazide (HYDRODIURIL) 25 MG tablet Take 1 tablet (25 mg) by mouth daily 90 tablet 1     lisinopril (ZESTRIL) 20 MG tablet Take 1 tablet (20 mg) by mouth daily 90 tablet 1     multivitamin w/minerals (MULTI-VITAMIN) tablet Take 1 tablet by mouth daily       Omega-3 Fatty Acids (FISH OIL PO)        trimethoprim (TRIMPEX) 100 MG tablet Take 1 tablet (100 mg) by mouth daily 90 tablet 1       Allergies   Allergen Reactions     Sulfa Drugs         Social History     Tobacco Use     Smoking status: Former Smoker     Quit date: 1945     Years since quittin.2     Smokeless tobacco: Never Used     Tobacco comment: smoked for two years   Substance Use Topics     Alcohol use: Yes     Comment: rarely     Family History   Problem Relation Age of Onset     Coronary Artery Disease Father          of MI AT AGE 59      Bladder Cancer Brother      Irregular heart beat Mother      History   Drug Use No         Objective     There were no vitals taken for this visit.    Physical Exam  GENERAL APPEARANCE: healthy, alert and no distress  HENT: ear canals and TM's normal and nose and mouth without ulcers or lesions  RESP: lungs clear to auscultation - no rales, rhonchi or  wheezes  CV: regular rate and rhythm, normal S1 S2, no S3 or S4 and no murmur, click or rub   ABDOMEN: soft, nontender, no HSM or masses and bowel sounds normal  NEURO: Normal strength and tone, sensory exam grossly normal, mentation intact and speech normal    Recent Labs   Lab Test 05/20/21  1115 02/16/21  1527   HGB 15.1  --      --     136   POTASSIUM 4.3 4.0   CR 0.80 1.01   A1C 5.3  --         Diagnostics:  Recent Results (from the past 48 hour(s))   SARS-COV2 (COVID-19) Virus RT-PCR    Collection Time: 08/09/21 10:22 AM    Specimen: Nasopharyngeal; Swab   Result Value Ref Range    SARS CoV2 PCR Negative Negative   CBC with platelets    Collection Time: 08/09/21  1:10 PM   Result Value Ref Range    WBC Count 12.2 (H) 4.0 - 11.0 10e3/uL    RBC Count 4.87 3.80 - 5.20 10e6/uL    Hemoglobin 15.0 11.7 - 15.7 g/dL    Hematocrit 43.7 35.0 - 47.0 %    MCV 90 78 - 100 fL    MCH 30.8 26.5 - 33.0 pg    MCHC 34.3 31.5 - 36.5 g/dL    RDW 14.6 10.0 - 15.0 %    Platelet Count 274 150 - 450 10e3/uL   Basic metabolic panel  (Ca, Cl, CO2, Creat, Gluc, K, Na, BUN)    Collection Time: 08/09/21  1:10 PM   Result Value Ref Range    Sodium 131 (L) 133 - 144 mmol/L    Potassium 4.0 3.4 - 5.3 mmol/L    Chloride 97 94 - 109 mmol/L    Carbon Dioxide (CO2) 24 20 - 32 mmol/L    Anion Gap 10 3 - 14 mmol/L    Urea Nitrogen 12 7 - 30 mg/dL    Creatinine 0.98 0.52 - 1.04 mg/dL    Calcium 9.5 8.5 - 10.1 mg/dL    Glucose 131 (H) 70 - 99 mg/dL    GFR Estimate 52 (L) >60 mL/min/1.73m2      EKG: sinus rhythm, RBBB, normal axis, normal intervals, no acute ST/T changes c/w ischemia  Echocardiogram(8-3-21)  - Interpretation Summary     1. The left ventricle is normal in size. The visual ejection fraction is  estimated at 55%.  2. The right ventricle is normal in structure, function and size.  3. No valve disease.     Sinus rhythm was noted. The patient exhibited frequent PVCs.    Revised Cardiac Risk Index (RCRI):  The patient has the  following serious cardiovascular risks for perioperative complications:   - No serious cardiac risks = 0 points     RCRI Interpretation: 0 points: Class I (very low risk - 0.4% complication rate)           Signed Electronically by: Alex Whitten MD  Copy of this evaluation report is provided to requesting physician.

## 2021-08-09 NOTE — PROGRESS NOTES
[address] [address]  Phone: [phone]  Primary Provider: Dinah Green        PREOPERATIVE EVALUATION:  Today's date: 8/9/2021    Floridalma Cunningham is a 86 year old female who presents for a preoperative evaluation.    Surgical Information:  Surgery/Procedure: Right Total Knee Athroplasty  Surgery Location: [facility]  Surgeon: Dr. Awan  Surgery Date: 08/11/2021  Time of Surgery: 7:30am  Where patient plans to recover: At a TCU (Transitional Care Unit)  Fax number for surgical facility: Note does not need to be faxed, will be available electronically in Epic.    Type of Anesthesia Anticipated: General    Assessment & Plan     The proposed surgical procedure is considered INTERMEDIATE risk.    Preoperative examination    - CBC with platelets; Future  - Basic metabolic panel  (Ca, Cl, CO2, Creat, Gluc, K, Na, BUN); Future    Irregular heart beat    - CBC with platelets; Future  - Basic metabolic panel  (Ca, Cl, CO2, Creat, Gluc, K, Na, BUN); Future    Essential hypertension    - CBC with platelets; Future  - Basic metabolic panel  (Ca, Cl, CO2, Creat, Gluc, K, Na, BUN); Future    Arthritis of knee    - CBC with platelets; Future  - Basic metabolic panel  (Ca, Cl, CO2, Creat, Gluc, K, Na, BUN); Future         Risks and Recommendations:  The patient has the following additional risks and recommendations for perioperative complications:  Cardiovascular:   - Cardiology consulted, and did echocardiogram for PVC's and RBBB    Medication Instructions:   - aspirin: Discontinue aspirin 7-10 days prior to procedure to reduce bleeding risk. It should be resumed postoperatively.     RECOMMENDATION:  APPROVAL GIVEN to proceed with proposed procedure, without further diagnostic evaluation.          Subjective       Preop Questions 8/9/2021   1. Have you ever had a heart attack or stroke? No   2. Have you ever had surgery on your heart  or blood vessels, such as a stent placement, a coronary artery bypass, or surgery on an artery in your head, neck, heart, or legs? No   3. Do you have chest pain with activity? No   4. Do you have a history of  heart failure? No   5. Do you currently have a cold, bronchitis or symptoms of other infection? No   6. Do you have a cough, shortness of breath, or wheezing? No   7. Do you or anyone in your family have previous history of blood clots? No   8. Do you or does anyone in your family have a serious bleeding problem such as prolonged bleeding following surgeries or cuts? No   9. Have you ever had problems with anemia or been told to take iron pills? No   10. Have you had any abnormal blood loss such as black, tarry or bloody stools, or abnormal vaginal bleeding? No   11. Have you ever had a blood transfusion? No   12. Are you willing to have a blood transfusion if it is medically needed before, during, or after your surgery? Yes   13. Have you or any of your relatives ever had problems with anesthesia? No   14. Do you have sleep apnea, excessive snoring or daytime drowsiness? No   15. Do you have any artifical heart valves or other implanted medical devices like a pacemaker, defibrillator, or continuous glucose monitor? No   16. Do you have artificial joints? No   17. Are you allergic to latex? No       Health Care Directive:  Patient does not have a Health Care Directive or Living Will: Discussed advance care planning with patient; however, patient declined at this time.    Preoperative Review of :   reviewed - no record of controlled substances prescribed.          Review of Systems  CONSTITUTIONAL: NEGATIVE for fever, chills, change in weight  ENT/MOUTH: NEGATIVE for ear, mouth and throat problems  RESP: NEGATIVE for significant cough or SOB  CV: NEGATIVE for chest pain, palpitations or peripheral edema    Patient Active Problem List    Diagnosis Date Noted     RBBB (right bundle branch block)       Priority: Medium     PVC's (premature ventricular contractions)      Priority: Medium     Essential hypertension 2019     Priority: Medium     Asymptomatic postmenopausal status 2019     Priority: Medium     Arthritis of knee 2019     Priority: Medium     Knees  mostly, seeing orthopedic         Past Medical History:   Diagnosis Date     Hyperlipidemia      Hypertension      PVC's (premature ventricular contractions)      RBBB (right bundle branch block)      No past surgical history on file.  Current Outpatient Medications   Medication Sig Dispense Refill     atorvastatin (LIPITOR) 10 MG tablet Take 1 tablet (10 mg) by mouth daily 90 tablet 1     Co-Enzyme Q-10 100 MG CAPS        CRANBERRY PO Take 1 tablet by mouth daily       hydrochlorothiazide (HYDRODIURIL) 25 MG tablet Take 1 tablet (25 mg) by mouth daily 90 tablet 1     lisinopril (ZESTRIL) 20 MG tablet Take 1 tablet (20 mg) by mouth daily 90 tablet 1     multivitamin w/minerals (MULTI-VITAMIN) tablet Take 1 tablet by mouth daily       Omega-3 Fatty Acids (FISH OIL PO)        trimethoprim (TRIMPEX) 100 MG tablet Take 1 tablet (100 mg) by mouth daily 90 tablet 1       Allergies   Allergen Reactions     Sulfa Drugs         Social History     Tobacco Use     Smoking status: Former Smoker     Quit date: 1945     Years since quittin.2     Smokeless tobacco: Never Used     Tobacco comment: smoked for two years   Substance Use Topics     Alcohol use: Yes     Comment: rarely     Family History   Problem Relation Age of Onset     Coronary Artery Disease Father          of MI AT AGE 59      Bladder Cancer Brother      Irregular heart beat Mother      History   Drug Use No         Objective     There were no vitals taken for this visit.    Physical Exam  GENERAL APPEARANCE: healthy, alert and no distress  HENT: ear canals and TM's normal and nose and mouth without ulcers or lesions  RESP: lungs clear to auscultation - no rales, rhonchi or  wheezes  CV: regular rate and rhythm, normal S1 S2, no S3 or S4 and no murmur, click or rub   ABDOMEN: soft, nontender, no HSM or masses and bowel sounds normal  NEURO: Normal strength and tone, sensory exam grossly normal, mentation intact and speech normal    Recent Labs   Lab Test 05/20/21  1115 02/16/21  1527   HGB 15.1  --      --     136   POTASSIUM 4.3 4.0   CR 0.80 1.01   A1C 5.3  --         Diagnostics:  Recent Results (from the past 48 hour(s))   SARS-COV2 (COVID-19) Virus RT-PCR    Collection Time: 08/09/21 10:22 AM    Specimen: Nasopharyngeal; Swab   Result Value Ref Range    SARS CoV2 PCR Negative Negative   CBC with platelets    Collection Time: 08/09/21  1:10 PM   Result Value Ref Range    WBC Count 12.2 (H) 4.0 - 11.0 10e3/uL    RBC Count 4.87 3.80 - 5.20 10e6/uL    Hemoglobin 15.0 11.7 - 15.7 g/dL    Hematocrit 43.7 35.0 - 47.0 %    MCV 90 78 - 100 fL    MCH 30.8 26.5 - 33.0 pg    MCHC 34.3 31.5 - 36.5 g/dL    RDW 14.6 10.0 - 15.0 %    Platelet Count 274 150 - 450 10e3/uL   Basic metabolic panel  (Ca, Cl, CO2, Creat, Gluc, K, Na, BUN)    Collection Time: 08/09/21  1:10 PM   Result Value Ref Range    Sodium 131 (L) 133 - 144 mmol/L    Potassium 4.0 3.4 - 5.3 mmol/L    Chloride 97 94 - 109 mmol/L    Carbon Dioxide (CO2) 24 20 - 32 mmol/L    Anion Gap 10 3 - 14 mmol/L    Urea Nitrogen 12 7 - 30 mg/dL    Creatinine 0.98 0.52 - 1.04 mg/dL    Calcium 9.5 8.5 - 10.1 mg/dL    Glucose 131 (H) 70 - 99 mg/dL    GFR Estimate 52 (L) >60 mL/min/1.73m2      EKG: sinus rhythm, RBBB, normal axis, normal intervals, no acute ST/T changes c/w ischemia  Echocardiogram(8-3-21)  - Interpretation Summary     1. The left ventricle is normal in size. The visual ejection fraction is  estimated at 55%.  2. The right ventricle is normal in structure, function and size.  3. No valve disease.     Sinus rhythm was noted. The patient exhibited frequent PVCs.    Revised Cardiac Risk Index (RCRI):  The patient has the  following serious cardiovascular risks for perioperative complications:   - No serious cardiac risks = 0 points     RCRI Interpretation: 0 points: Class I (very low risk - 0.4% complication rate)           Signed Electronically by: Alex Whitten MD  Copy of this evaluation report is provided to requesting physician.

## 2021-08-10 ENCOUNTER — ANESTHESIA EVENT (OUTPATIENT)
Dept: SURGERY | Facility: CLINIC | Age: 86
End: 2021-08-10
Payer: COMMERCIAL

## 2021-08-10 PROBLEM — N18.30 CHRONIC KIDNEY DISEASE, STAGE 3 (H): Status: ACTIVE | Noted: 2021-08-10

## 2021-08-10 LAB
ANION GAP SERPL CALCULATED.3IONS-SCNC: 10 MMOL/L (ref 3–14)
BUN SERPL-MCNC: 12 MG/DL (ref 7–30)
CALCIUM SERPL-MCNC: 9.5 MG/DL (ref 8.5–10.1)
CHLORIDE BLD-SCNC: 97 MMOL/L (ref 94–109)
CO2 SERPL-SCNC: 24 MMOL/L (ref 20–32)
CREAT SERPL-MCNC: 0.98 MG/DL (ref 0.52–1.04)
GFR SERPL CREATININE-BSD FRML MDRD: 52 ML/MIN/1.73M2
GLUCOSE BLD-MCNC: 131 MG/DL (ref 70–99)
POTASSIUM BLD-SCNC: 4 MMOL/L (ref 3.4–5.3)
SODIUM SERPL-SCNC: 131 MMOL/L (ref 133–144)

## 2021-08-10 ASSESSMENT — ENCOUNTER SYMPTOMS: DYSRHYTHMIAS: 1

## 2021-08-10 NOTE — PROGRESS NOTES
PTA medications updated by Medication Scribe prior to surgery via phone call with patient (last doses completed by Nurse)     Medication history sources: Patient, Surescripts, H&P and Patient's home med list  In the past week, patient estimated taking medication this percent of the time: Greater than 90%  Adherence assessment: N/A Not Observed    Significant changes made to the medication list:  Patient reports no longer taking the following meds (med scribe removed from PTA med list): Co-Enzyme Q-10      Additional medication history information:   None    Medication reconciliation completed by provider prior to medication history? No    Time spent in this activity: 20 minutes    The information provided in this note is only as accurate as the sources available at the time of update(s)      Prior to Admission medications    Medication Sig Last Dose Taking? Auth Provider   atorvastatin (LIPITOR) 10 MG tablet Take 1 tablet (10 mg) by mouth daily  at AM Yes Dinah Green MD   CRANBERRY PO Take 1 tablet by mouth daily  at AM Yes Reported, Patient   hydrochlorothiazide (HYDRODIURIL) 25 MG tablet Take 1 tablet (25 mg) by mouth daily  at AM Yes Dinah Green MD   lisinopril (ZESTRIL) 20 MG tablet Take 1 tablet (20 mg) by mouth daily  at AM Yes Dinah Green MD   multivitamin w/minerals (MULTI-VITAMIN) tablet Take 1 tablet by mouth daily  at AM Yes Reported, Patient   Omega-3 Fatty Acids (FISH OIL PO)   at AM Yes Reported, Patient   trimethoprim (TRIMPEX) 100 MG tablet Take 1 tablet (100 mg) by mouth daily  at AM Yes Dinah Green MD Gerard Burgess [facility]  Medication Scribe  [facility]

## 2021-08-10 NOTE — ANESTHESIA PREPROCEDURE EVALUATION
Anesthesia Pre-Procedure Evaluation    Patient: Floridalma Cunningham   MRN: [id number] : 1935        Preoperative Diagnosis: Arthrosis of knee [M17.10]   Procedure : Procedure(s):  RIGHT TOTAL KNEE ARTHROPLASTY     Past Medical History:   Diagnosis Date     Hyperlipidemia      Hypertension      PVC's (premature ventricular contractions)      RBBB (right bundle branch block)       No past surgical history on file.   Allergies   Allergen Reactions     Sulfa Drugs       Social History     Tobacco Use     Smoking status: Former Smoker     Quit date: 1945     Years since quittin.2     Smokeless tobacco: Never Used     Tobacco comment: smoked for two years   Substance Use Topics     Alcohol use: Yes     Comment: rarely      Wt Readings from Last 1 Encounters:   21 76.2 kg (168 lb)        Anesthesia Evaluation            ROS/MED HX  ENT/Pulmonary:     (+) tobacco use, Past use,  (-) sleep apnea   Neurologic:  - neg neurologic ROS     Cardiovascular:     (+) Dyslipidemia hypertension-----dysrhythmias, PVCs,     METS/Exercise Tolerance:     Hematologic:       Musculoskeletal:   (+) arthritis,     GI/Hepatic:    (-) GERD   Renal/Genitourinary:     (+) renal disease, type: CRI,     Endo:    (-) Type II DM   Psychiatric/Substance Use:       Infectious Disease:       Malignancy:       Other:            Physical Exam    Airway        Mallampati: II   TM distance: > 3 FB   Neck ROM: full   Mouth opening: > 3 cm    Respiratory Devices and Support         Dental  no notable dental history         Cardiovascular   cardiovascular exam normal          Pulmonary   pulmonary exam normal                OUTSIDE LABS:  CBC:   Lab Results   Component Value Date    WBC 12.2 (H) 2021    WBC 13.9 (H) 2021    HGB 15.0 2021    HGB 15.1 2021    HCT 43.7 2021    HCT 45.4 2021     2021     2021     BMP:   Lab Results   Component Value Date     (L) 2021    NA  140 05/20/2021    POTASSIUM 4.0 08/09/2021    POTASSIUM 4.3 05/20/2021    CHLORIDE 97 08/09/2021    CHLORIDE 106 05/20/2021    CO2 24 08/09/2021    CO2 30 05/20/2021    BUN 12 08/09/2021    BUN 19 05/20/2021    CR 0.98 08/09/2021    CR 0.80 05/20/2021     (H) 08/09/2021    GLC 90 05/20/2021     COAGS: No results found for: PTT, INR, FIBR  POC: No results found for: BGM, HCG, HCGS  HEPATIC:   Lab Results   Component Value Date    ALBUMIN 3.7 05/20/2021    PROTTOTAL 7.2 05/20/2021    ALT 30 05/20/2021    AST 20 05/20/2021    ALKPHOS 93 05/20/2021    BILITOTAL 0.9 05/20/2021     OTHER:   Lab Results   Component Value Date    A1C 5.3 05/20/2021    VLADIMIR 9.5 08/09/2021    MAG 2.1 05/20/2021    TSH 0.80 05/20/2021    TSH 0.80 05/20/2021    T4 1.14 05/20/2021       Anesthesia Plan    ASA Status:  2   NPO Status:  NPO Appropriate    Anesthesia Type: Spinal.              Consents    Anesthesia Plan(s) and associated risks, benefits, and realistic alternatives discussed. Questions answered and patient/representative(s) expressed understanding.     - Discussed with:  Patient         Postoperative Care    Pain management: Multi-modal analgesia, Peripheral nerve block (Single Shot).   PONV prophylaxis: Ondansetron (or other 5HT-3)     Comments:                Carolee Trujillo MD

## 2021-08-11 ENCOUNTER — APPOINTMENT (OUTPATIENT)
Dept: PHYSICAL THERAPY | Facility: CLINIC | Age: 86
End: 2021-08-11
Attending: ORTHOPAEDIC SURGERY
Payer: COMMERCIAL

## 2021-08-11 ENCOUNTER — HOSPITAL ENCOUNTER (OUTPATIENT)
Facility: CLINIC | Age: 86
LOS: 1 days | Discharge: NURSING, REHAB OR LONG-TERM CARE | End: 2021-08-12
Attending: ORTHOPAEDIC SURGERY | Admitting: ORTHOPAEDIC SURGERY
Payer: COMMERCIAL

## 2021-08-11 ENCOUNTER — APPOINTMENT (OUTPATIENT)
Dept: GENERAL RADIOLOGY | Facility: CLINIC | Age: 86
End: 2021-08-11
Attending: ORTHOPAEDIC SURGERY
Payer: COMMERCIAL

## 2021-08-11 ENCOUNTER — ANESTHESIA (OUTPATIENT)
Dept: SURGERY | Facility: CLINIC | Age: 86
End: 2021-08-11
Payer: COMMERCIAL

## 2021-08-11 DIAGNOSIS — Z96.651 H/O TOTAL KNEE REPLACEMENT, RIGHT: Primary | ICD-10-CM

## 2021-08-11 DIAGNOSIS — Z96.651 TOTAL KNEE REPLACEMENT STATUS, RIGHT: ICD-10-CM

## 2021-08-11 DIAGNOSIS — I10 ESSENTIAL HYPERTENSION: ICD-10-CM

## 2021-08-11 LAB
POTASSIUM BLD-SCNC: 4.1 MMOL/L (ref 3.4–5.3)
SODIUM SERPL-SCNC: 133 MMOL/L (ref 133–144)

## 2021-08-11 PROCEDURE — 97161 PT EVAL LOW COMPLEX 20 MIN: CPT | Mod: GP

## 2021-08-11 PROCEDURE — 250N000009 HC RX 250: Performed by: ANESTHESIOLOGY

## 2021-08-11 PROCEDURE — 258N000003 HC RX IP 258 OP 636: Performed by: ANESTHESIOLOGY

## 2021-08-11 PROCEDURE — 99207 PR CDG-CODE CATEGORY CHANGED: CPT | Performed by: PHYSICIAN ASSISTANT

## 2021-08-11 PROCEDURE — 36415 COLL VENOUS BLD VENIPUNCTURE: CPT | Performed by: ANESTHESIOLOGY

## 2021-08-11 PROCEDURE — 278N000051 HC OR IMPLANT GENERAL: Performed by: ORTHOPAEDIC SURGERY

## 2021-08-11 PROCEDURE — 370N000017 HC ANESTHESIA TECHNICAL FEE, PER MIN: Performed by: ORTHOPAEDIC SURGERY

## 2021-08-11 PROCEDURE — C1776 JOINT DEVICE (IMPLANTABLE): HCPCS | Performed by: ORTHOPAEDIC SURGERY

## 2021-08-11 PROCEDURE — 250N000011 HC RX IP 250 OP 636: Performed by: ORTHOPAEDIC SURGERY

## 2021-08-11 PROCEDURE — 999N000063 XR KNEE PORT RIGHT 1/2 VIEWS: Mod: RT

## 2021-08-11 PROCEDURE — 97530 THERAPEUTIC ACTIVITIES: CPT | Mod: GP

## 2021-08-11 PROCEDURE — 999N000141 HC STATISTIC PRE-PROCEDURE NURSING ASSESSMENT: Performed by: ORTHOPAEDIC SURGERY

## 2021-08-11 PROCEDURE — 250N000009 HC RX 250: Performed by: ORTHOPAEDIC SURGERY

## 2021-08-11 PROCEDURE — 84132 ASSAY OF SERUM POTASSIUM: CPT | Performed by: ANESTHESIOLOGY

## 2021-08-11 PROCEDURE — 272N000001 HC OR GENERAL SUPPLY STERILE: Performed by: ORTHOPAEDIC SURGERY

## 2021-08-11 PROCEDURE — 250N000013 HC RX MED GY IP 250 OP 250 PS 637: Performed by: ORTHOPAEDIC SURGERY

## 2021-08-11 PROCEDURE — 710N000009 HC RECOVERY PHASE 1, LEVEL 1, PER MIN: Performed by: ORTHOPAEDIC SURGERY

## 2021-08-11 PROCEDURE — 84295 ASSAY OF SERUM SODIUM: CPT | Performed by: ANESTHESIOLOGY

## 2021-08-11 PROCEDURE — 258N000001 HC RX 258: Performed by: ORTHOPAEDIC SURGERY

## 2021-08-11 PROCEDURE — 99203 OFFICE O/P NEW LOW 30 MIN: CPT | Performed by: PHYSICIAN ASSISTANT

## 2021-08-11 PROCEDURE — 250N000011 HC RX IP 250 OP 636: Performed by: ANESTHESIOLOGY

## 2021-08-11 PROCEDURE — 360N000077 HC SURGERY LEVEL 4, PER MIN: Performed by: ORTHOPAEDIC SURGERY

## 2021-08-11 PROCEDURE — 258N000003 HC RX IP 258 OP 636: Performed by: ORTHOPAEDIC SURGERY

## 2021-08-11 DEVICE — IMP COMP TIBIAL KNEE ASCENT 71MM 141223: Type: IMPLANTABLE DEVICE | Site: KNEE | Status: FUNCTIONAL

## 2021-08-11 DEVICE — IMP COMP PATELLA BIOM 3 PEG 34MM STD 184766: Type: IMPLANTABLE DEVICE | Site: KNEE | Status: FUNCTIONAL

## 2021-08-11 DEVICE — IMP INSERT TIBIAL BIOM PS PLUS BEARING 14X71/75MM 183744: Type: IMPLANTABLE DEVICE | Site: KNEE | Status: FUNCTIONAL

## 2021-08-11 DEVICE — IMP COMP FEMORAL VANGARD BIOM PS RT 62.5MM 183106: Type: IMPLANTABLE DEVICE | Site: KNEE | Status: FUNCTIONAL

## 2021-08-11 DEVICE — IMP COMP FEMORAL VANGARD BIOM FIXATION 183099: Type: IMPLANTABLE DEVICE | Site: KNEE | Status: FUNCTIONAL

## 2021-08-11 DEVICE — BONE CEMENT RADIOPAQUE SIMPLEX HV FULL DOSE 6194-1-001: Type: IMPLANTABLE DEVICE | Site: KNEE | Status: FUNCTIONAL

## 2021-08-11 RX ORDER — MEPERIDINE HYDROCHLORIDE 25 MG/ML
12.5 INJECTION INTRAMUSCULAR; INTRAVENOUS; SUBCUTANEOUS EVERY 5 MIN PRN
Status: DISCONTINUED | OUTPATIENT
Start: 2021-08-11 | End: 2021-08-11 | Stop reason: HOSPADM

## 2021-08-11 RX ORDER — TRANEXAMIC ACID 650 MG/1
1950 TABLET ORAL ONCE
Status: COMPLETED | OUTPATIENT
Start: 2021-08-11 | End: 2021-08-11

## 2021-08-11 RX ORDER — HYDROCODONE BITARTRATE AND ACETAMINOPHEN 5; 325 MG/1; MG/1
1-2 TABLET ORAL EVERY 4 HOURS PRN
Status: DISCONTINUED | OUTPATIENT
Start: 2021-08-11 | End: 2021-08-12 | Stop reason: HOSPADM

## 2021-08-11 RX ORDER — ACETAMINOPHEN 325 MG/1
975 TABLET ORAL EVERY 8 HOURS
Status: DISCONTINUED | OUTPATIENT
Start: 2021-08-11 | End: 2021-08-12 | Stop reason: HOSPADM

## 2021-08-11 RX ORDER — ONDANSETRON 4 MG/1
4 TABLET, ORALLY DISINTEGRATING ORAL EVERY 30 MIN PRN
Status: DISCONTINUED | OUTPATIENT
Start: 2021-08-11 | End: 2021-08-11 | Stop reason: HOSPADM

## 2021-08-11 RX ORDER — PROCHLORPERAZINE MALEATE 5 MG
5 TABLET ORAL EVERY 6 HOURS PRN
Status: DISCONTINUED | OUTPATIENT
Start: 2021-08-11 | End: 2021-08-12 | Stop reason: HOSPADM

## 2021-08-11 RX ORDER — BUPIVACAINE HYDROCHLORIDE 7.5 MG/ML
INJECTION, SOLUTION INTRASPINAL
Status: DISCONTINUED | OUTPATIENT
Start: 2021-08-11 | End: 2021-08-11

## 2021-08-11 RX ORDER — CEFAZOLIN SODIUM 2 G/100ML
2 INJECTION, SOLUTION INTRAVENOUS SEE ADMIN INSTRUCTIONS
Status: DISCONTINUED | OUTPATIENT
Start: 2021-08-11 | End: 2021-08-11 | Stop reason: HOSPADM

## 2021-08-11 RX ORDER — ONDANSETRON 2 MG/ML
4 INJECTION INTRAMUSCULAR; INTRAVENOUS EVERY 6 HOURS PRN
Status: DISCONTINUED | OUTPATIENT
Start: 2021-08-11 | End: 2021-08-12 | Stop reason: HOSPADM

## 2021-08-11 RX ORDER — HYDROXYZINE HYDROCHLORIDE 10 MG/1
10 TABLET, FILM COATED ORAL EVERY 6 HOURS PRN
Qty: 30 TABLET | Refills: 0 | Status: SHIPPED | OUTPATIENT
Start: 2021-08-11 | End: 2021-08-18

## 2021-08-11 RX ORDER — AMOXICILLIN 250 MG
1-2 CAPSULE ORAL 2 TIMES DAILY
Qty: 30 TABLET | Refills: 0 | Status: SHIPPED | OUTPATIENT
Start: 2021-08-11 | End: 2021-08-13

## 2021-08-11 RX ORDER — ONDANSETRON 2 MG/ML
4 INJECTION INTRAMUSCULAR; INTRAVENOUS EVERY 30 MIN PRN
Status: DISCONTINUED | OUTPATIENT
Start: 2021-08-11 | End: 2021-08-11 | Stop reason: HOSPADM

## 2021-08-11 RX ORDER — LIDOCAINE HYDROCHLORIDE 20 MG/ML
INJECTION, SOLUTION INFILTRATION; PERINEURAL PRN
Status: DISCONTINUED | OUTPATIENT
Start: 2021-08-11 | End: 2021-08-11

## 2021-08-11 RX ORDER — ASPIRIN 81 MG/1
162 TABLET ORAL 2 TIMES DAILY
Qty: 120 TABLET | Refills: 0 | Status: SHIPPED | OUTPATIENT
Start: 2021-08-11 | End: 2021-09-10

## 2021-08-11 RX ORDER — ASPIRIN 81 MG/1
162 TABLET ORAL 2 TIMES DAILY
Status: DISCONTINUED | OUTPATIENT
Start: 2021-08-11 | End: 2021-08-12 | Stop reason: HOSPADM

## 2021-08-11 RX ORDER — DIMENHYDRINATE 50 MG/ML
25 INJECTION, SOLUTION INTRAMUSCULAR; INTRAVENOUS
Status: DISCONTINUED | OUTPATIENT
Start: 2021-08-11 | End: 2021-08-11 | Stop reason: HOSPADM

## 2021-08-11 RX ORDER — SODIUM CHLORIDE, SODIUM LACTATE, POTASSIUM CHLORIDE, CALCIUM CHLORIDE 600; 310; 30; 20 MG/100ML; MG/100ML; MG/100ML; MG/100ML
INJECTION, SOLUTION INTRAVENOUS CONTINUOUS
Status: DISCONTINUED | OUTPATIENT
Start: 2021-08-11 | End: 2021-08-11 | Stop reason: HOSPADM

## 2021-08-11 RX ORDER — POLYETHYLENE GLYCOL 3350 17 G/17G
17 POWDER, FOR SOLUTION ORAL DAILY
Status: DISCONTINUED | OUTPATIENT
Start: 2021-08-12 | End: 2021-08-12 | Stop reason: HOSPADM

## 2021-08-11 RX ORDER — POLYETHYLENE GLYCOL 3350 17 G/17G
1 POWDER, FOR SOLUTION ORAL DAILY
Qty: 7 PACKET | Refills: 0 | Status: SHIPPED | OUTPATIENT
Start: 2021-08-11 | End: 2022-04-12

## 2021-08-11 RX ORDER — HYDROCHLOROTHIAZIDE 25 MG/1
25 TABLET ORAL DAILY
Status: DISCONTINUED | OUTPATIENT
Start: 2021-08-12 | End: 2021-08-11

## 2021-08-11 RX ORDER — ALBUTEROL SULFATE 0.83 MG/ML
2.5 SOLUTION RESPIRATORY (INHALATION) EVERY 4 HOURS PRN
Status: DISCONTINUED | OUTPATIENT
Start: 2021-08-11 | End: 2021-08-11 | Stop reason: HOSPADM

## 2021-08-11 RX ORDER — HYDRALAZINE HYDROCHLORIDE 20 MG/ML
2.5-5 INJECTION INTRAMUSCULAR; INTRAVENOUS EVERY 10 MIN PRN
Status: DISCONTINUED | OUTPATIENT
Start: 2021-08-11 | End: 2021-08-11 | Stop reason: HOSPADM

## 2021-08-11 RX ORDER — NALOXONE HYDROCHLORIDE 0.4 MG/ML
0.4 INJECTION, SOLUTION INTRAMUSCULAR; INTRAVENOUS; SUBCUTANEOUS
Status: DISCONTINUED | OUTPATIENT
Start: 2021-08-11 | End: 2021-08-12 | Stop reason: HOSPADM

## 2021-08-11 RX ORDER — ONDANSETRON 2 MG/ML
INJECTION INTRAMUSCULAR; INTRAVENOUS PRN
Status: DISCONTINUED | OUTPATIENT
Start: 2021-08-11 | End: 2021-08-11

## 2021-08-11 RX ORDER — AMOXICILLIN 250 MG
1 CAPSULE ORAL 2 TIMES DAILY
Status: DISCONTINUED | OUTPATIENT
Start: 2021-08-11 | End: 2021-08-12 | Stop reason: HOSPADM

## 2021-08-11 RX ORDER — FAMOTIDINE 20 MG/1
20 TABLET, FILM COATED ORAL DAILY
Status: DISCONTINUED | OUTPATIENT
Start: 2021-08-11 | End: 2021-08-12 | Stop reason: HOSPADM

## 2021-08-11 RX ORDER — HYDROMORPHONE HCL IN WATER/PF 6 MG/30 ML
0.2 PATIENT CONTROLLED ANALGESIA SYRINGE INTRAVENOUS
Status: DISCONTINUED | OUTPATIENT
Start: 2021-08-11 | End: 2021-08-12 | Stop reason: HOSPADM

## 2021-08-11 RX ORDER — NALOXONE HYDROCHLORIDE 0.4 MG/ML
0.2 INJECTION, SOLUTION INTRAMUSCULAR; INTRAVENOUS; SUBCUTANEOUS
Status: DISCONTINUED | OUTPATIENT
Start: 2021-08-11 | End: 2021-08-12 | Stop reason: HOSPADM

## 2021-08-11 RX ORDER — OXYCODONE HYDROCHLORIDE 5 MG/1
5 TABLET ORAL EVERY 4 HOURS PRN
Status: DISCONTINUED | OUTPATIENT
Start: 2021-08-11 | End: 2021-08-11

## 2021-08-11 RX ORDER — LIDOCAINE 40 MG/G
CREAM TOPICAL
Status: DISCONTINUED | OUTPATIENT
Start: 2021-08-11 | End: 2021-08-12 | Stop reason: HOSPADM

## 2021-08-11 RX ORDER — FENTANYL CITRATE 0.05 MG/ML
50 INJECTION, SOLUTION INTRAMUSCULAR; INTRAVENOUS EVERY 5 MIN PRN
Status: DISCONTINUED | OUTPATIENT
Start: 2021-08-11 | End: 2021-08-11 | Stop reason: HOSPADM

## 2021-08-11 RX ORDER — ACETAMINOPHEN 325 MG/1
650 TABLET ORAL EVERY 4 HOURS PRN
Status: DISCONTINUED | OUTPATIENT
Start: 2021-08-14 | End: 2021-08-12 | Stop reason: HOSPADM

## 2021-08-11 RX ORDER — DEXAMETHASONE SODIUM PHOSPHATE 10 MG/ML
4 INJECTION, SOLUTION INTRAMUSCULAR; INTRAVENOUS
Status: DISCONTINUED | OUTPATIENT
Start: 2021-08-11 | End: 2021-08-11 | Stop reason: HOSPADM

## 2021-08-11 RX ORDER — MAGNESIUM HYDROXIDE 1200 MG/15ML
LIQUID ORAL PRN
Status: DISCONTINUED | OUTPATIENT
Start: 2021-08-11 | End: 2021-08-11 | Stop reason: HOSPADM

## 2021-08-11 RX ORDER — ONDANSETRON 4 MG/1
4-8 TABLET, ORALLY DISINTEGRATING ORAL EVERY 8 HOURS PRN
Qty: 10 TABLET | Refills: 0 | Status: SHIPPED | OUTPATIENT
Start: 2021-08-11 | End: 2021-08-18 | Stop reason: DRUGHIGH

## 2021-08-11 RX ORDER — ONDANSETRON 4 MG/1
4 TABLET, ORALLY DISINTEGRATING ORAL EVERY 6 HOURS PRN
Status: DISCONTINUED | OUTPATIENT
Start: 2021-08-11 | End: 2021-08-12 | Stop reason: HOSPADM

## 2021-08-11 RX ORDER — EPHEDRINE SULFATE 50 MG/ML
INJECTION, SOLUTION INTRAMUSCULAR; INTRAVENOUS; SUBCUTANEOUS PRN
Status: DISCONTINUED | OUTPATIENT
Start: 2021-08-11 | End: 2021-08-11

## 2021-08-11 RX ORDER — ACETAMINOPHEN 325 MG/1
650 TABLET ORAL EVERY 4 HOURS PRN
Qty: 100 TABLET | Refills: 0 | Status: SHIPPED | OUTPATIENT
Start: 2021-08-11 | End: 2021-08-18 | Stop reason: DRUGHIGH

## 2021-08-11 RX ORDER — MAGNESIUM HYDROXIDE/ALUMINUM HYDROXICE/SIMETHICONE 120; 1200; 1200 MG/30ML; MG/30ML; MG/30ML
30 SUSPENSION ORAL EVERY 4 HOURS PRN
Status: DISCONTINUED | OUTPATIENT
Start: 2021-08-11 | End: 2021-08-12 | Stop reason: HOSPADM

## 2021-08-11 RX ORDER — HYDROMORPHONE HCL IN WATER/PF 6 MG/30 ML
0.4 PATIENT CONTROLLED ANALGESIA SYRINGE INTRAVENOUS EVERY 5 MIN PRN
Status: DISCONTINUED | OUTPATIENT
Start: 2021-08-11 | End: 2021-08-11 | Stop reason: HOSPADM

## 2021-08-11 RX ORDER — CEFAZOLIN SODIUM 1 G/3ML
1 INJECTION, POWDER, FOR SOLUTION INTRAMUSCULAR; INTRAVENOUS EVERY 8 HOURS
Status: COMPLETED | OUTPATIENT
Start: 2021-08-11 | End: 2021-08-11

## 2021-08-11 RX ORDER — PROPOFOL 10 MG/ML
INJECTION, EMULSION INTRAVENOUS PRN
Status: DISCONTINUED | OUTPATIENT
Start: 2021-08-11 | End: 2021-08-11

## 2021-08-11 RX ORDER — HYDROXYZINE HYDROCHLORIDE 10 MG/1
10 TABLET, FILM COATED ORAL EVERY 6 HOURS PRN
Status: DISCONTINUED | OUTPATIENT
Start: 2021-08-11 | End: 2021-08-12 | Stop reason: HOSPADM

## 2021-08-11 RX ORDER — PROPOFOL 10 MG/ML
INJECTION, EMULSION INTRAVENOUS CONTINUOUS PRN
Status: DISCONTINUED | OUTPATIENT
Start: 2021-08-11 | End: 2021-08-11

## 2021-08-11 RX ORDER — DEXAMETHASONE SODIUM PHOSPHATE 4 MG/ML
INJECTION, SOLUTION INTRA-ARTICULAR; INTRALESIONAL; INTRAMUSCULAR; INTRAVENOUS; SOFT TISSUE PRN
Status: DISCONTINUED | OUTPATIENT
Start: 2021-08-11 | End: 2021-08-11

## 2021-08-11 RX ORDER — LABETALOL HYDROCHLORIDE 5 MG/ML
10 INJECTION, SOLUTION INTRAVENOUS
Status: DISCONTINUED | OUTPATIENT
Start: 2021-08-11 | End: 2021-08-11 | Stop reason: HOSPADM

## 2021-08-11 RX ORDER — DIPHENHYDRAMINE HCL 12.5MG/5ML
12.5 LIQUID (ML) ORAL EVERY 6 HOURS PRN
Status: DISCONTINUED | OUTPATIENT
Start: 2021-08-11 | End: 2021-08-12 | Stop reason: HOSPADM

## 2021-08-11 RX ORDER — ONDANSETRON 4 MG/1
4 TABLET, ORALLY DISINTEGRATING ORAL EVERY 30 MIN PRN
Qty: 6 TABLET | Refills: 0 | Status: SHIPPED | OUTPATIENT
Start: 2021-08-11 | End: 2021-08-18

## 2021-08-11 RX ORDER — SODIUM CHLORIDE 9 MG/ML
INJECTION, SOLUTION INTRAVENOUS CONTINUOUS
Status: DISCONTINUED | OUTPATIENT
Start: 2021-08-11 | End: 2021-08-12 | Stop reason: HOSPADM

## 2021-08-11 RX ORDER — BISACODYL 10 MG
10 SUPPOSITORY, RECTAL RECTAL DAILY PRN
Status: DISCONTINUED | OUTPATIENT
Start: 2021-08-11 | End: 2021-08-12 | Stop reason: HOSPADM

## 2021-08-11 RX ORDER — TRIMETHOPRIM 100 MG/1
100 TABLET ORAL DAILY
Status: DISCONTINUED | OUTPATIENT
Start: 2021-08-12 | End: 2021-08-12 | Stop reason: HOSPADM

## 2021-08-11 RX ORDER — CEFAZOLIN SODIUM 2 G/100ML
2 INJECTION, SOLUTION INTRAVENOUS
Status: COMPLETED | OUTPATIENT
Start: 2021-08-11 | End: 2021-08-11

## 2021-08-11 RX ORDER — LISINOPRIL 20 MG/1
20 TABLET ORAL DAILY
Status: DISCONTINUED | OUTPATIENT
Start: 2021-08-12 | End: 2021-08-12 | Stop reason: HOSPADM

## 2021-08-11 RX ORDER — FENTANYL CITRATE 50 UG/ML
INJECTION, SOLUTION INTRAMUSCULAR; INTRAVENOUS PRN
Status: DISCONTINUED | OUTPATIENT
Start: 2021-08-11 | End: 2021-08-11

## 2021-08-11 RX ADMIN — PHENYLEPHRINE HYDROCHLORIDE 100 MCG: 10 INJECTION INTRAVENOUS at 07:56

## 2021-08-11 RX ADMIN — PROPOFOL 80 MCG/KG/MIN: 10 INJECTION, EMULSION INTRAVENOUS at 07:37

## 2021-08-11 RX ADMIN — ASPIRIN 162 MG: 81 TABLET, COATED ORAL at 11:52

## 2021-08-11 RX ADMIN — Medication 5 MG: at 08:37

## 2021-08-11 RX ADMIN — ONDANSETRON 4 MG: 2 INJECTION INTRAMUSCULAR; INTRAVENOUS at 08:59

## 2021-08-11 RX ADMIN — MIDAZOLAM HYDROCHLORIDE 0.5 MG: 1 INJECTION, SOLUTION INTRAMUSCULAR; INTRAVENOUS at 06:58

## 2021-08-11 RX ADMIN — PHENYLEPHRINE HYDROCHLORIDE 100 MCG: 10 INJECTION INTRAVENOUS at 08:44

## 2021-08-11 RX ADMIN — PHENYLEPHRINE HYDROCHLORIDE 100 MCG: 10 INJECTION INTRAVENOUS at 08:29

## 2021-08-11 RX ADMIN — CEFAZOLIN 1 G: 1 INJECTION, POWDER, FOR SOLUTION INTRAMUSCULAR; INTRAVENOUS at 22:35

## 2021-08-11 RX ADMIN — PHENYLEPHRINE HYDROCHLORIDE 100 MCG: 10 INJECTION INTRAVENOUS at 08:23

## 2021-08-11 RX ADMIN — BUPIVACAINE HYDROCHLORIDE IN DEXTROSE 1.5 ML: 7.5 INJECTION, SOLUTION SUBARACHNOID at 07:35

## 2021-08-11 RX ADMIN — PHENYLEPHRINE HYDROCHLORIDE 100 MCG: 10 INJECTION INTRAVENOUS at 08:11

## 2021-08-11 RX ADMIN — FENTANYL CITRATE 25 MCG: 50 INJECTION, SOLUTION INTRAMUSCULAR; INTRAVENOUS at 07:32

## 2021-08-11 RX ADMIN — SODIUM CHLORIDE: 9 INJECTION, SOLUTION INTRAVENOUS at 11:04

## 2021-08-11 RX ADMIN — ACETAMINOPHEN 975 MG: 325 TABLET, FILM COATED ORAL at 15:14

## 2021-08-11 RX ADMIN — Medication 1 LOZENGE: at 14:16

## 2021-08-11 RX ADMIN — PHENYLEPHRINE HYDROCHLORIDE 100 MCG: 10 INJECTION INTRAVENOUS at 08:02

## 2021-08-11 RX ADMIN — PHENYLEPHRINE HYDROCHLORIDE 100 MCG: 10 INJECTION INTRAVENOUS at 08:53

## 2021-08-11 RX ADMIN — ASPIRIN 162 MG: 81 TABLET, COATED ORAL at 20:41

## 2021-08-11 RX ADMIN — SODIUM CHLORIDE, POTASSIUM CHLORIDE, SODIUM LACTATE AND CALCIUM CHLORIDE: 600; 310; 30; 20 INJECTION, SOLUTION INTRAVENOUS at 06:47

## 2021-08-11 RX ADMIN — PHENYLEPHRINE HYDROCHLORIDE 150 MCG: 10 INJECTION INTRAVENOUS at 08:06

## 2021-08-11 RX ADMIN — DEXAMETHASONE SODIUM PHOSPHATE 4 MG: 4 INJECTION, SOLUTION INTRA-ARTICULAR; INTRALESIONAL; INTRAMUSCULAR; INTRAVENOUS; SOFT TISSUE at 07:54

## 2021-08-11 RX ADMIN — CEFAZOLIN 1 G: 1 INJECTION, POWDER, FOR SOLUTION INTRAMUSCULAR; INTRAVENOUS at 15:14

## 2021-08-11 RX ADMIN — PHENYLEPHRINE HYDROCHLORIDE 100 MCG: 10 INJECTION INTRAVENOUS at 08:16

## 2021-08-11 RX ADMIN — ACETAMINOPHEN 975 MG: 325 TABLET, FILM COATED ORAL at 21:41

## 2021-08-11 RX ADMIN — PROPOFOL 20 MG: 10 INJECTION, EMULSION INTRAVENOUS at 07:37

## 2021-08-11 RX ADMIN — FAMOTIDINE 20 MG: 20 TABLET ORAL at 11:52

## 2021-08-11 RX ADMIN — PHENYLEPHRINE HYDROCHLORIDE 100 MCG: 10 INJECTION INTRAVENOUS at 07:47

## 2021-08-11 RX ADMIN — PHENYLEPHRINE HYDROCHLORIDE 100 MCG: 10 INJECTION INTRAVENOUS at 08:37

## 2021-08-11 RX ADMIN — PHENYLEPHRINE HYDROCHLORIDE 100 MCG: 10 INJECTION INTRAVENOUS at 09:05

## 2021-08-11 RX ADMIN — Medication 5 MG: at 08:29

## 2021-08-11 RX ADMIN — Medication 5 MG: at 08:11

## 2021-08-11 RX ADMIN — Medication 5 MG: at 08:23

## 2021-08-11 RX ADMIN — PHENYLEPHRINE HYDROCHLORIDE 100 MCG: 10 INJECTION INTRAVENOUS at 07:52

## 2021-08-11 RX ADMIN — LIDOCAINE HYDROCHLORIDE 60 MG: 20 INJECTION, SOLUTION INFILTRATION; PERINEURAL at 07:37

## 2021-08-11 RX ADMIN — Medication 5 MG: at 08:16

## 2021-08-11 RX ADMIN — CEFAZOLIN SODIUM 2 G: 2 INJECTION, SOLUTION INTRAVENOUS at 07:45

## 2021-08-11 RX ADMIN — TRANEXAMIC ACID 1950 MG: 650 TABLET ORAL at 06:02

## 2021-08-11 ASSESSMENT — MIFFLIN-ST. JEOR: SCORE: 1202.92

## 2021-08-11 ASSESSMENT — LIFESTYLE VARIABLES: TOBACCO_USE: 1

## 2021-08-11 NOTE — PROGRESS NOTES
08/11/21 1700   Quick Adds   Type of Visit Initial PT Evaluation   Living Environment   People in home alone   Current Living Arrangements residential facility   Home Accessibility no concerns   Transportation Anticipated family or friend will provide   Living Environment Comments Gila will provide ride home; living in independent facility   Self-Care   Usual Activity Tolerance good   Current Activity Tolerance moderate   Equipment Currently Used at Home walker, rolling   Disability/Function   Fall history within last six months no   General Information   Onset of Illness/Injury or Date of Surgery 08/11/21   Referring Physician Eamon Awan    Patient/Family Therapy Goals Statement (PT) To get better    Pertinent History of Current Problem (include personal factors and/or comorbidities that impact the POC) R TKA    Weight-Bearing Status - RLE weight-bearing as tolerated   Cognition   Orientation Status (Cognition) oriented x 4   Pain Assessment   Patient Currently in Pain Yes, see Vital Sign flowsheet   Range of Motion (ROM)   ROM Quick Adds ROM deficits secondary to surgical procedure   ROM Comment R LE ROM: 4-76 deg    Strength   Manual Muscle Testing Quick Adds Deficits observed during functional mobility   Strength Comments Difficulty tolerating significant amb    Bed Mobility   Bed Mobility supine-sit;sit-supine   Supine-Sit [place] (Bed Mobility) minimum assist (75% patient effort)   Sit-Supine [place] (Bed Mobility) minimum assist (75% patient effort)   Sit-Stand Transfer   Sit-Stand [place] (Transfers) contact guard   Assistive Device (Sit-Stand Transfers) walker, front-wheeled   Gait/Stairs (Locomotion)   [place] Level (Gait) contact guard   Assistive Device (Gait) walker, front-wheeled   Distance in Feet (Required for LE Total Joints) 18'   Balance   Balance no deficits were identified   Clinical Impression   Criteria for Skilled Therapeutic Intervention yes, treatment indicated    PT Diagnosis (PT) Impaired bed mobility; impaired transfers; impaired gait    Influenced by the following impairments Pain; weakness; limited R LE ROM   Functional limitations due to impairments R TKA   Clinical Presentation Stable/Uncomplicated   Clinical Presentation Rationale Patient's condition currently stable    Clinical Decision Making (Complexity) low complexity   Therapy Frequency (PT) Daily   Predicted Duration of Therapy Intervention (days/wks) 7 days   Planned Therapy Interventions (PT) balance training;bed mobility training;gait training;home exercise program;strengthening;transfer training   Anticipated Equipment Needs at Discharge (PT) walker, rolling   Risk & Benefits of therapy have been explained evaluation/treatment results reviewed;care plan/treatment goals reviewed;risks/benefits reviewed   Clinical Impression Comments Patient demonstrating increased R LE pain, weakness, and limited ROM causing reduced tolerance to activity including bed mobility, transfers, and amb.    PT Discharge Planning    PT Rationale for DC Rec Patient will require assist w/ all needs upon return home due to reduced tolerance to activity   PT Brief overview of current status  Ax1   Total Evaluation Time   Total Evaluation Time (Minutes) 14

## 2021-08-11 NOTE — BRIEF OP NOTE
[facility]    Brief Operative Note    Pre-operative diagnosis: Arthrosis of knee [M17.10]  Post-operative diagnosis Same as pre-operative diagnosis    Procedure: Procedure(s):  RIGHT TOTAL KNEE ARTHROPLASTY  Surgeon: Surgeon(s) and Role:     * Eamon Awan MD - Primary  asst ginger herrera  Anesthesia: General   Estimated blood loss: Less than 10 ml  Drains: Hemovac  Specimens:   ID Type Source Tests Collected by Time Destination   A : RIGHT KNEE TISSUE/BONE DISCARDED IN OR PER EXCEPTION LIST Other Other OR DOCUMENTATION ONLY Eamon Awan MD 8/11/2021  8:12 AM      Findings:   None.  Complications: None.  Implants:   Implant Name Type Inv. Item Serial No.  Lot No. LRB No. Used Action   BONE CEMENT RADIOPAQUE SIMPLEX HV FULL DOSE 6194-1-001 - [id number] Cement, Bone BONE CEMENT RADIOPAQUE SIMPLEX HV FULL DOSE 6194-1-001  [employer] [id number] Right 2 Implanted   IMP COMP TIBIAL KNEE ASCENT 71MM [id number] - [id number] Total Joint Component/Insert IMP COMP TIBIAL KNEE ASCENT 71MM [id number]  [employer] [id number] Right 1 Implanted   IMP COMP FEMORAL VANGARD BIOM PS RT 62.5MM [id number] - [id number] Total Joint Component/Insert IMP COMP FEMORAL VANGARD BIOM PS RT 62.5MM [id number]  [employer] [id number] Right 1 Implanted   IMP COMP PATELLA BIOM 3 PEG 34MM STD 367568 - [id number] Total Joint Component/Insert IMP COMP PATELLA BIOM 3 PEG 34MM STD 795639  [employer] [id number] Right 1 Implanted   IMP COMP FEMORAL VANGARD BIOM FIXATION 402385 - [id number] Total Joint Component/Insert IMP COMP FEMORAL VANGARD BIOM FIXATION 718065  [employer] [id number] Right 1 Implanted   IMP INSERT TIBIAL BIOM PS PLUS BEARING 14X71/75MM 763049 - [id number] Total Joint Component/Insert IMP INSERT TIBIAL BIOM PS PLUS BEARING 14X71/75MM [id number]  [employer] [id number] Right 1 Implanted

## 2021-08-11 NOTE — ANESTHESIA PROCEDURE NOTES
Intrathecal injection Procedure Note  Pre-Procedure   Staff -        Anesthesiologist:  Carolee Trujillo MD       Performed By: anesthesiologist       Location: OR       Pre-Anesthestic Checklist: patient identified, IV checked, site marked, risks and benefits discussed, informed consent, monitors and equipment checked, pre-op evaluation, at physician/surgeon's request and post-op pain management  Timeout:       Correct Patient: Yes        Correct Procedure: Yes        Correct Site: Yes        Correct Position: Yes   Procedure Documentation  Procedure: intrathecal injection       Patient Position: sitting       Patient Prep/Sterile Barriers: sterile gloves, mask, patient draped       Skin prep: Betadine       Insertion Site: L3-4. (midline approach).       Spinal Needle Type: Kirsten-Momo       Introducer used       Introducer: 20 G       # of attempts: 1 and  # of redirects:  0    Assessment/Narrative         Paresthesias: No.       CSF fluid: clear.    Medication(s) Administered   0.75% Hyperbaric Bupivacaine (Intrathecal), 1.5 mL  Medication Administration Time: 8/11/2021 7:35 AM    Comments:  12.5 mg 0.75% BUPIVACAINE WITH 8.25% DEXTROSE INJECTED. No paresthesia on injection. Patient tolerated the procedure well.

## 2021-08-11 NOTE — ANESTHESIA CARE TRANSFER NOTE
Patient: Floridalma Cunningham    Procedure(s):  RIGHT TOTAL KNEE ARTHROPLASTY    Diagnosis: Arthrosis of knee [M17.10]  Diagnosis Additional Information: No value filed.    Anesthesia Type:   Spinal     Note:    Oropharynx: oropharynx clear of all foreign objects and spontaneously breathing  Level of Consciousness: awake  Oxygen Supplementation: face mask  Level of Supplemental Oxygen (L/min / FiO2): 6  Independent Airway: airway patency satisfactory and stable  Dentition: dentition unchanged  Vital Signs Stable: post-procedure vital signs reviewed and stable  Report to RN Given: handoff report given  Patient transferred to: PACU    Handoff Report: Identifed the Patient, Identified the Reponsible Provider, Reviewed the pertinent medical history, Discussed the surgical course, Reviewed Intra-OP anesthesia mangement and issues during anesthesia, Set expectations for post-procedure period and Allowed opportunity for questions and acknowledgement of understanding      Vitals:  Vitals Value Taken Time   BP     Temp     Pulse     Resp     SpO2         Electronically Signed By: ALEXIS Vargas CRNA  August 11, 2021  9:15 AM

## 2021-08-11 NOTE — ANESTHESIA POSTPROCEDURE EVALUATION
Patient: Floridalma Cunningham    Procedure(s):  RIGHT TOTAL KNEE ARTHROPLASTY    Diagnosis:Arthrosis of knee [M17.10]  Diagnosis Additional Information: No value filed.    Anesthesia Type:  Spinal    Note:  Disposition: Admission   Postop Pain Control: Uneventful            Sign Out: Well controlled pain   PONV: No   Neuro/Psych: Uneventful            Sign Out: Acceptable/Baseline neuro status   Airway/Respiratory: Uneventful            Sign Out: Acceptable/Baseline resp. status   CV/Hemodynamics: Uneventful            Sign Out: Acceptable CV status; No obvious hypovolemia; No obvious fluid overload   Other NRE: NONE   DID A NON-ROUTINE EVENT OCCUR? No           Last vitals:  Vitals Value Taken Time   /71 08/11/21 0950   Temp 36.7  C (98  F) 08/11/21 0914   Pulse 90 08/11/21 0953   Resp 16 08/11/21 0953   SpO2 97 % 08/11/21 0953   Vitals shown include unvalidated device data.    Electronically Signed By: aCrolee Trujillo MD  August 11, 2021  9:54 AM

## 2021-08-11 NOTE — PLAN OF CARE
[facility]      OUTPATIENT PHYSICAL THERAPY EVALUATION  PLAN OF TREATMENT FOR OUTPATIENT REHABILITATION  (COMPLETE FOR INITIAL CLAIMS ONLY)  Patient's Last Name, First Name, M.I.  YOB: 1935  Floridalma Cunningham                        Provider's Name  [facility] Medical Record No.  [id number]                               Onset Date:  08/11/21   Start of Care Date:      8/11/21   Type:     _X_PT   ___OT   ___SLP Medical Diagnosis:     R TKA                    PT Diagnosis:  Impaired bed mobility; impaired transfers; impaired gait    Visits from SOC:  1   _________________________________________________________________________________  Plan of Treatment/Functional Goals    Planned Interventions: balance training, bed mobility training, gait training, home exercise program, strengthening, transfer training     Goals: See Physical Therapy Goals on Care Plan in [facility] electronic health record.    Therapy Frequency: Daily  Predicted Duration of Therapy Intervention: 7 days  _________________________________________________________________________________    I CERTIFY THE NEED FOR THESE SERVICES FURNISHED UNDER        THIS PLAN OF TREATMENT AND WHILE UNDER MY CARE     (Physician co-signature of this document indicates review and certification of the therapy plan).               ,      Referring Physician: Eamon Awan             Initial Assessment        See Physical Therapy evaluation dated   in Epic electronic health record.

## 2021-08-11 NOTE — CONSULTS
[facility]  Consult Note - Hospitalist Service     Date of Admission:  8/11/2021  Consult Requested by: Dr. Hancock  Reason for Consult: Management of HTN post-op    Assessment & Plan   Floridalma Cunningham is a 86 year old female admitted on 8/11/2021. She has a past medical history of hypertension, hyperlipidemia and PVCs who was admitted outpatient by orthopedics following a right total knee arthroplasty.    Status post right total knee arthroplasty  -Postoperative day 0  -Routine postoperative cares and pain control defer to Ortho    Essential hypertension [hydrochlorothiazide 25 mg tablet daily and lisinopril 20 mg tablet daily]  -Continue PTA lisinopril  -Hold hydrochlorothiazide, can likely resume on discharge    History of PVCs: Recent work-up by cardiology after noted during a dental visit.  Zio patch with 18% PVCs.  TTE without significant abnormalities.  Patient offered treatment with beta-blocker but she declined    Hyperlipidemia  -Continue statin         The patient's care was discussed with the Patient.    Toya Wilkerson PA-C  [facility]  Securely message with the Vocera Web Console (learn more here)  Text page via ZAP Paging/Directory      Clinically Significant Risk Factors Present on Admission                   ______________________________________________________________________    Chief Complaint   Status post right total neuroplasty    History is obtained from the patient and review of EMR    History of Present Illness   Floridalma Cunningham is a 86 year old female with a past medical history of hypertension, PVCs hyperlipidemia who was admitted to the care of orthopedics following a right total knee arthroplasty.  Procedure was performed by  under general anesthesia.  Patient tolerated procedure well and EBL documented is less than 10 mL.    Presently, she is evaluated in her hospital room.  Denies any issues with postoperative pain.  No  chest pain or shortness of breath.  No nausea or vomiting.  She took both of her blood pressure medicines today prior to surgery.  She is hopeful for discharge to [facility] when she leave the hospital as she currently lives independently.    Review of Systems   The 10 point Review of Systems is negative other than noted in the HPI or here.     Past Medical History    I have reviewed this patient's medical history and updated it with pertinent information if needed.   Past Medical History:   Diagnosis Date     Arthritis      Hyperlipidemia      Hypertension      PVC's (premature ventricular contractions)      RBBB (right bundle branch block)        Past Surgical History   I have reviewed this patient's surgical history and updated it with pertinent information if needed.  No past surgical history on file.    Social History   I have reviewed this patient's social history and updated it with pertinent information if needed.  Social History     Tobacco Use     Smoking status: Former Smoker     Quit date: 1945     Years since quittin.2     Smokeless tobacco: Never Used     Tobacco comment: smoked for two years   Vaping Use     Vaping Use: Never used   Substance Use Topics     Alcohol use: Yes     Comment: rarely     Drug use: No       Family History   I have reviewed this patient's family history and updated it with pertinent information if needed.  Family History   Problem Relation Age of Onset     Coronary Artery Disease Father          of MI AT AGE 59      Bladder Cancer Brother      Irregular heart beat Mother        Medications   I have reviewed this patient's current medications    Allergies   Allergies   Allergen Reactions     Sulfa Drugs        Physical Exam   Vital Signs: Temp: 97.6  F (36.4  C) Temp src: Oral BP: 113/63 Pulse: 84   Resp: 20 SpO2: 99 % O2 Device: None (Room air) Oxygen Delivery: 2 LPM  Weight: 168 lbs 0 oz    Constitutional: Alert, resting comfortably in NAD  HEENT: Head  normocephalic, atraumatic. Eyes sclera non icteric.   Respiratory: Normal effort, symmetric expansion, no crackles or wheezing  Cardiovascular: RRR no murmurs   GI: Non distended, normal bowels sounds, no tenderness or guarding  MSK: LE without edema. Dorsalis pedis pulse palpated bilaterally. Ace wrap to RLE  Skin/Integumen: Clear  Neuro: CN II-XII grossly intact  Psych:  Alert and oriented x 3. Normal affect      Data   Results for orders placed or performed during the hospital encounter of 08/11/21 (from the past 24 hour(s))   Potassium   Result Value Ref Range    Potassium 4.1 3.4 - 5.3 mmol/L   Sodium   Result Value Ref Range    Sodium 133 133 - 144 mmol/L   XR Knee Port Right 1/2 Views    Narrative    KNEE PORTABLE RIGHT ONE TO TWO VIEWS August 11, 2021 10:07 AM     HISTORY: Postoperative total knee.    COMPARISON: None.      Impression    IMPRESSION: Right total knee arthroplasty in place. Surgical drain in  place anteriorly. No evidence of complication.

## 2021-08-11 NOTE — PROGRESS NOTES
Patient vital signs are at baseline: Yes  Patient able to ambulate as they were prior to admission or with assist devices provided by therapies during their stay:  No,  Reason:  PT this pm, up w/ assist of 1, walker and gait belt to bedside commode  Patient MUST void prior to discharge:  Yes  Patient able to tolerate oral intake:  Yes  Pain has adequate pain control using Oral analgesics:  Denies pain at this time, scheduled Tylenol given

## 2021-08-11 NOTE — OP NOTE
Procedure Date: 08/11/2021    PREOPERATIVE DIAGNOSIS:  End-stage tricompartment arthrosis of the right knee.    POSTOPERATIVE DIAGNOSIS:  End-stage tricompartment arthrosis of the right knee.    PROCEDURE PERFORMED:  Right total knee arthroplasty.    SURGEON:  Eamon Awan MD    ASSISTANT:  Monico Garnica PA-C    ANESTHESIA:  Spinal plus adductor canal.    IMPLANT:  Biomet Vanguard knee.  The sizes are listed in the operative summary by the nursing staff.    ANTIBIOTICS:  2 grams Ancef given preop.      DEEP VENOUS THROMBOSIS PROPHYLAXIS:  Sequential calf compression devices, aspirin therapy, which will begin today.      Again, the size of the prosthesis was a 62.5 right posterior stabilized femur, tibial insert was a 14 mm x 71/75, tibial plateau component was a 71, and the patella was a 34 mm round all-poly patellar button.    DESCRIPTION OF PROCEDURE:  Cemented components.  Antibiotics were given preop, 2 grams.  He was given at appropriate time.  Prepped and draped in usual fashion.  Timeout was requested.  Tourniquet inflated to 250 after exsanguination.  Midline incision made medial parapatellar arthrotomy.  Range of motion preop was -10 to about 105.  Patella was cut back appropriately to size 34.  Drilled cement anchoring holes, brought that over, fat pad removed, brought the patella without eversion laterally.  Flexed the knee up.  Intramedullary guide was used to produce a 5-degree valgus cut of the distal femur, referencing across the epicondylar axis.  Irrigated copiously.  Box opening placed.  Removed large posterior osteophytes.    Cleaned things out along the way thoroughly.  Then, tibia drawn forward.  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  Using preoperative templating, we cut the tibial component down to referencing the medial 1 part, lateral 3 part referencing, fair amount of bone removal.  Preservation was done, avoid injury to any vital structures.  Irrigated  copiously.  Drilled cement anchoring holes after doing trials.  Once the cement cured, we placed a 14 mm insert with excellent flexion and extension space balance.  Double and triple checked for cement debris, washed it out thoroughly, irrigated it.  Then, with the knee held flexed, retinaculum was reapproximated with interrupted 0 Ethibond and #1 running Ethibond, followed by 0 Vicryl, 2-0 Vicryl, staples.  Very thin skin medial and anterior midline.  Hemovac brought out.  Sponge and needle counts were correct x2.  Pressure wrap applied from toes to groin, extra padding, and Adaptic over the potential thin skin.    TOURNIQUET TIME:  See anesthetic record.     No complications.  Cemented total knee.      Next, 100 mL of the [facility] pain cocktail was utilized.      Eamon Awan MD        D: 2021   T: 2021   MT: [id number]    Name:     YUNG ORO  MRN:      [id number]        Account:        [id number]   :      1935           Procedure Date: 2021     Document: [id number]

## 2021-08-11 NOTE — ANESTHESIA PROCEDURE NOTES
"Adductor canal and Femoral Procedure Note  Pre-Procedure   Staff -        Anesthesiologist:  Carolee Trujillo MD       Performed By: anesthesiologist       Location: pre-op       Pre-Anesthestic Checklist: patient identified, IV checked, site marked, risks and benefits discussed, informed consent, monitors and equipment checked, pre-op evaluation, at physician/surgeon's request and post-op pain management  Timeout:       Correct Patient: Yes        Correct Procedure: Yes        Correct Site: Yes        Correct Position: Yes        Correct Laterality: Yes        Site Marked: Yes  Procedure Documentation  Procedure: Adductor canal, Femoral       Laterality: right       Patient Position: supine       Patient Prep/Sterile Barriers: sterile gloves, mask, \"no-touch\" technique        Skin prep: Chloraprep       Local skin infiltrated with 3 mL of 1% lidocaine.        Needle Type: insulated and short bevel ([place]unk)       Needle Gauge: 21.        Needle Length (millimeters): 100        Ultrasound guided       1. Ultrasound was used to identify targeted nerve, plexus, vascular marker, or fascial plane and place a needle adjacent to it in real-time.       2. Ultrasound was used to visualize the spread of anesthetic in close proximity to the above referenced structure.       3. A permanent image is entered into the patient's record.       4. The visualized anatomic structures appeared normal.       5. There were no apparent abnormal pathologic findings.    Assessment/Narrative         The placement was negative for: blood aspirated, painful injection and site bleeding       Paresthesias: No.    Test dose of mL at.         Test dose negative, 3 minutes after injection, for signs of intravascular, subdural, or intrathecal injection.     Bolus given via needle..        Secured via.        Insertion/Infusion Method: Single Shot       Complications: none       Injection made incrementally with aspirations every 5 mL.    Comments:  " 0.5% Bupivacaine with 1:400,000 epinephrine injected.  Patient tolerated the procedure well.    The surgeon has given a verbal order transferring care of this patient to me for the performance of a regional analgesia block for post-op pain control. It is requested of me because I am uniquely trained and qualified to perform this block and the surgeon is neither trained nor qualified to perform this procedure.

## 2021-08-12 ENCOUNTER — APPOINTMENT (OUTPATIENT)
Dept: PHYSICAL THERAPY | Facility: CLINIC | Age: 86
End: 2021-08-12
Attending: ORTHOPAEDIC SURGERY
Payer: COMMERCIAL

## 2021-08-12 ENCOUNTER — APPOINTMENT (OUTPATIENT)
Dept: OCCUPATIONAL THERAPY | Facility: CLINIC | Age: 86
End: 2021-08-12
Attending: ORTHOPAEDIC SURGERY
Payer: COMMERCIAL

## 2021-08-12 VITALS
HEART RATE: 81 BPM | RESPIRATION RATE: 18 BRPM | DIASTOLIC BLOOD PRESSURE: 56 MMHG | BODY MASS INDEX: 27.99 KG/M2 | WEIGHT: 168 LBS | HEIGHT: 65 IN | OXYGEN SATURATION: 98 % | SYSTOLIC BLOOD PRESSURE: 130 MMHG | TEMPERATURE: 98.2 F

## 2021-08-12 LAB
ALBUMIN SERPL-MCNC: 2.8 G/DL (ref 3.4–5)
ALP SERPL-CCNC: 76 U/L (ref 40–150)
ALT SERPL W P-5'-P-CCNC: 25 U/L (ref 0–50)
ANION GAP SERPL CALCULATED.3IONS-SCNC: 7 MMOL/L (ref 3–14)
AST SERPL W P-5'-P-CCNC: 15 U/L (ref 0–45)
BASOPHILS # BLD AUTO: 0 10E3/UL (ref 0–0.2)
BASOPHILS NFR BLD AUTO: 0 %
BILIRUB SERPL-MCNC: 0.9 MG/DL (ref 0.2–1.3)
BUN SERPL-MCNC: 13 MG/DL (ref 7–30)
CALCIUM SERPL-MCNC: 8.5 MG/DL (ref 8.5–10.1)
CHLORIDE BLD-SCNC: 101 MMOL/L (ref 94–109)
CO2 SERPL-SCNC: 24 MMOL/L (ref 20–32)
CREAT SERPL-MCNC: 0.79 MG/DL (ref 0.52–1.04)
EOSINOPHIL # BLD AUTO: 0.1 10E3/UL (ref 0–0.7)
EOSINOPHIL NFR BLD AUTO: 1 %
ERYTHROCYTE [DISTWIDTH] IN BLOOD BY AUTOMATED COUNT: 13.9 % (ref 10–15)
GFR SERPL CREATININE-BSD FRML MDRD: 68 ML/MIN/1.73M2
GLUCOSE BLD-MCNC: 80 MG/DL (ref 70–99)
HCT VFR BLD AUTO: 33.8 % (ref 35–47)
HGB BLD-MCNC: 11.2 G/DL (ref 11.7–15.7)
IMM GRANULOCYTES # BLD: 0.1 10E3/UL
IMM GRANULOCYTES NFR BLD: 1 %
LYMPHOCYTES # BLD AUTO: 1.7 10E3/UL (ref 0.8–5.3)
LYMPHOCYTES NFR BLD AUTO: 13 %
MCH RBC QN AUTO: 29.9 PG (ref 26.5–33)
MCHC RBC AUTO-ENTMCNC: 33.1 G/DL (ref 31.5–36.5)
MCV RBC AUTO: 90 FL (ref 78–100)
MONOCYTES # BLD AUTO: 0.8 10E3/UL (ref 0–1.3)
MONOCYTES NFR BLD AUTO: 6 %
NEUTROPHILS # BLD AUTO: 10.3 10E3/UL (ref 1.6–8.3)
NEUTROPHILS NFR BLD AUTO: 79 %
NRBC # BLD AUTO: 0 10E3/UL
NRBC BLD AUTO-RTO: 0 /100
PLATELET # BLD AUTO: 212 10E3/UL (ref 150–450)
POTASSIUM BLD-SCNC: 4 MMOL/L (ref 3.4–5.3)
PROT SERPL-MCNC: 5.4 G/DL (ref 6.8–8.8)
RBC # BLD AUTO: 3.74 10E6/UL (ref 3.8–5.2)
SODIUM SERPL-SCNC: 132 MMOL/L (ref 133–144)
WBC # BLD AUTO: 13.1 10E3/UL (ref 4–11)

## 2021-08-12 PROCEDURE — 97535 SELF CARE MNGMENT TRAINING: CPT | Mod: GO | Performed by: OCCUPATIONAL THERAPIST

## 2021-08-12 PROCEDURE — 97165 OT EVAL LOW COMPLEX 30 MIN: CPT | Mod: GO | Performed by: OCCUPATIONAL THERAPIST

## 2021-08-12 PROCEDURE — 82040 ASSAY OF SERUM ALBUMIN: CPT | Performed by: HOSPITALIST

## 2021-08-12 PROCEDURE — 250N000013 HC RX MED GY IP 250 OP 250 PS 637: Performed by: ORTHOPAEDIC SURGERY

## 2021-08-12 PROCEDURE — 99213 OFFICE O/P EST LOW 20 MIN: CPT | Performed by: HOSPITALIST

## 2021-08-12 PROCEDURE — 85025 COMPLETE CBC W/AUTO DIFF WBC: CPT | Performed by: HOSPITALIST

## 2021-08-12 PROCEDURE — 99207 PR CDG-CODE CATEGORY CHANGED: CPT | Performed by: HOSPITALIST

## 2021-08-12 PROCEDURE — 97116 GAIT TRAINING THERAPY: CPT | Mod: GP | Performed by: PHYSICAL THERAPIST

## 2021-08-12 PROCEDURE — 97530 THERAPEUTIC ACTIVITIES: CPT | Mod: GP | Performed by: PHYSICAL THERAPIST

## 2021-08-12 PROCEDURE — 36415 COLL VENOUS BLD VENIPUNCTURE: CPT | Performed by: HOSPITALIST

## 2021-08-12 RX ORDER — HYDROCODONE BITARTRATE AND ACETAMINOPHEN 5; 325 MG/1; MG/1
1-2 TABLET ORAL EVERY 6 HOURS PRN
Qty: 50 TABLET | Refills: 0 | Status: SHIPPED | OUTPATIENT
Start: 2021-08-12 | End: 2021-08-16

## 2021-08-12 RX ORDER — HYDROCODONE BITARTRATE AND ACETAMINOPHEN 5; 325 MG/1; MG/1
1-2 TABLET ORAL EVERY 6 HOURS PRN
Qty: 50 TABLET | Refills: 0 | Status: SHIPPED | OUTPATIENT
Start: 2021-08-12 | End: 2021-08-12

## 2021-08-12 RX ORDER — HYDROCHLOROTHIAZIDE 12.5 MG/1
12.5 TABLET ORAL DAILY
Status: DISCONTINUED | OUTPATIENT
Start: 2021-08-12 | End: 2021-08-12 | Stop reason: HOSPADM

## 2021-08-12 RX ADMIN — LISINOPRIL 20 MG: 20 TABLET ORAL at 08:35

## 2021-08-12 RX ADMIN — HYDROXYZINE HYDROCHLORIDE 10 MG: 10 TABLET ORAL at 06:04

## 2021-08-12 RX ADMIN — SENNOSIDES AND DOCUSATE SODIUM 1 TABLET: 8.6; 5 TABLET ORAL at 08:35

## 2021-08-12 RX ADMIN — ASPIRIN 162 MG: 81 TABLET, COATED ORAL at 08:35

## 2021-08-12 RX ADMIN — ACETAMINOPHEN 975 MG: 325 TABLET, FILM COATED ORAL at 06:00

## 2021-08-12 RX ADMIN — FAMOTIDINE 20 MG: 20 TABLET ORAL at 08:35

## 2021-08-12 ASSESSMENT — ACTIVITIES OF DAILY LIVING (ADL)
PREVIOUS_RESPONSIBILITIES: HOUSEKEEPING;LAUNDRY;SHOPPING;MEDICATION MANAGEMENT
DEPENDENT_IADLS:: TRANSPORTATION

## 2021-08-12 NOTE — PROGRESS NOTES
"ORTHOPEDIC LOWER EXTREMITY PROGRESS NOTE    POD#1  Patient is a 86 year old female who underwent Procedure(s):  RIGHT TOTAL KNEE ARTHROPLASTY on 2021. Pain is well controlled. Tolerating medication well, no nausea or vomiting.    Vitals:   Blood pressure 130/56, pulse 81, temperature 98.2  F (36.8  C), temperature source Oral, resp. rate 18, height 1.651 m (5' 5\"), weight 76.2 kg (168 lb), SpO2 98 %.  Temp (24hrs), Av  F (36.7  C), Min:97.3  F (36.3  C), Max:98.6  F (37  C)      Drains: hemovac    EXAM   The patient is awake and alert.  Calves are soft and non-tender.   Sensation is intact.  Dorsiflexion and plantar flexion is intact.  Foot warm with nl cap refill.  The incision is covered.     Labs:   Recent Labs   Lab Test 21  0702 21  1310 21  1115   HGB 11.2* 15.0 15.1       ASSESSMENT  S/p R TKA   PLAN  1. DVT prophylaxis: aspirin  2. Weight Bearing: WBAT (Weight bearing as tolerated).  3. Anticipated discharge date today . Discharge to Skilled Nursing Facility, [facility] .  4. Cont Pain Control Cesia Walker PA-C  [facility]        " Patient instructed on:  NPO after midnight.  Bring insurance card(s) and phone number of ride.  No artificial fingernails or dark fingernail polish.  No jewelery or body piercing's.  Wear comfortable, loose clothing appropriate for the procedure.  Advised to have someone home with her after the procedure.  Procedural prep instructions received and reviewed.  Aware of medication(s) to take DOS with sip of water and/or hold per anesthesia and surgeon guidelines.  To bring a form of payment if requested to pay a portion of bill at registration.      COVID Patient Instructions Given:  • What to expect when you arrive at the facility  o Temperature screening upon arrival for patient and visitor  o Universal masking, mask must be worn and stay on  • Visitor must also wear a mask  • If you have a mask, please wear it.  One mask per person if you do not already have one.  o Maintain social distancing  • Maintain 6 feet of distance between you and other patient/visitors and staff  • Please don't move any furniture in the waiting areas  • Please be aware of and follow any sign or markings that will help ensure appropriate social distancing     • Instructions for visitor/  o Limit of 1 visitor per patient  o Visitors may want bring something to drink if the wait is expected to be lengthy  • Access to beverages may be limited  o Reading materials (magazines & newspapers) are not provided in the waiting areas.  TV will be on.   o Visitors may wait in car but need to provide phone number so they can be contacted when procedure is finished      • Patient aware of need for pre-procedure covid testing and will quarantine following test until procedure.  • Patient advised to contact surgeon if symptoms develop or have known exposure prior to procedure.    Patient verbalizes understanding

## 2021-08-12 NOTE — PROGRESS NOTES
Pt A&Ox4. CMS intact-baseline numbness with neuropathy. VSS. Up w/ A1 to BR. Denies pain. Voiding adequtely. Continue to monitor.

## 2021-08-12 NOTE — PROGRESS NOTES
08/12/21 0910   Living Environment   People in home alone   Current Living Arrangements independent living facility   Home Accessibility no concerns   Transportation Anticipated family or friend will provide   Living Environment Comments Pt lives in ILF, reports no stairs, has walk-in shower with grab bars, grab bars by toilet   Self-Care   Usual Activity Tolerance good   Current Activity Tolerance moderate   Equipment Currently Used at Home walker, rolling  (reacher)   Activity/Exercise/Self-Care Comment Pt reports she ambulates with 4WW at baseline, reports IND with ADLs, receives some meals from facility   Instrumental Activities of Daily Living (IADL)   Previous Responsibilities housekeeping;laundry;shopping;medication management   Disability/Function   Hearing Difficulty or Deaf no   Wear Glasses or Blind no   Concentrating, Remembering or Making Decisions Difficulty no   Difficulty Communicating no   Difficulty Eating/Swallowing no   Walking or Climbing Stairs Difficulty yes   Walking or Climbing Stairs ambulation difficulty, requires equipment   Dressing/Bathing Difficulty no   Toileting issues no   Doing Errands Independently Difficulty (such as shopping) yes   Errands Management does not drive   Fall history within last six months no   General Information   Onset of Illness/Injury or Date of Surgery 08/11/21   Referring Physician Eamon Awan MD   Additional Occupational Profile Info/Pertinent History of Current Problem Floridalma Cunningham is a 86 year old female admitted on 8/11/2021. She has a past medical history of hypertension, hyperlipidemia and PVCs who was admitted outpatient by orthopedics following a right total knee arthroplasty.   Existing Precautions/Restrictions fall   Right Lower Extremity (Weight-bearing Status) weight-bearing as tolerated (WBAT)   Cognitive Status Examination   Affect/Mental Status (Cognitive) WFL   Follows Commands WFL   Visual Perception   Visual Impairment/Limitations  WFL   Sensory   Sensory Quick Adds No deficits were identified   Pain Assessment   Patient Currently in Pain Yes, see Vital Sign flowsheet  (3/10)   Integumentary/Edema   Integumentary/Edema Comments Ace bandage to R LE   Posture   Posture protracted shoulders   Range of Motion Comprehensive   Comment, General Range of Motion R LE limited s/p TKA   Strength Comprehensive (MMT)   Comment, General Manual Muscle Testing (MMT) Assessment R LE limited s/p TKA   Muscle Tone Assessment   Muscle Tone Quick Adds No deficits were identified   Coordination   Upper Extremity Coordination No deficits were identified   Bed Mobility   Bed Mobility supine-sit;sit-supine   Supine-Sit [place] (Bed Mobility) moderate assist (50% patient effort)   Sit-Supine [place] (Bed Mobility) minimum assist (75% patient effort)   Assistive Device (Bed Mobility) bed rails   Transfers   Transfers sit-stand transfer;toilet transfer   Sit-Stand Transfer   Sit-Stand [place] (Transfers) minimum assist (75% patient effort)   Assistive Device (Sit-Stand Transfers) walker, front-wheeled   Toilet Transfer   Type (Toilet Transfer) sit-stand;stand-sit   [place] Level (Toilet Transfer) minimum assist (75% patient effort)   Assistive Device (Toilet Transfer) commode chair   Activities of Daily Living   BADL Assessment lower body dressing;toileting;bathing   Bathing Assessment   Comment (Bathing) Anticipate min A for bathing   Lower Body Dressing Assessment   [place] Level (Lower Body Dressing) moderate assist (50% patient effort)   Toileting   [place] Level (Toileting) minimum assist (75% patient effort)   Assistive Devices (Toileting) commode chair   Comment (Toileting) Pt needing assist to don underwear over ankles   Clinical Impression   Criteria for Skilled Therapeutic Interventions Met (OT) yes;meets criteria;skilled treatment is necessary   OT Diagnosis Impaired independence with I/ADLs   OT Problem List-Impairments impacting  ADL activity tolerance impaired;balance;mobility;range of motion (ROM);sensation;strength;pain   Assessment of Occupational Performance 3-5 Performance Deficits   Identified Performance Deficits func mobility, dressing, bathing, toileting   Planned Therapy Interventions (OT) ADL retraining;bed mobility training;transfer training   Clinical Decision Making Complexity (OT) low complexity   Therapy Frequency (OT) Daily   Predicted Duration of Therapy 2 days   Anticipated Equipment Needs Upon Discharge (OT)   (defer to TCU)   Risk & Benefits of therapy have been explained evaluation/treatment results reviewed;care plan/treatment goals reviewed;risks/benefits reviewed;current/potential barriers reviewed;participants voiced agreement with care plan;participants included;patient   OT Discharge Planning    OT Rationale for DC Rec Pt below baseline for functional mobility and ADL performance, lives alone in an ILF. Per chart, [facility]   Therapy Certification   Start of Care Date 08/12/21   Certification date from 08/12/21   Certification date to 08/14/21   Medical Diagnosis R TKA   Total Evaluation Time (Minutes)   Total Evaluation Time (Minutes) 5

## 2021-08-12 NOTE — PLAN OF CARE
Is The Patient Presenting As Previously Scheduled?: Yes Occupational Therapy Discharge Summary    Reason for therapy discharge:    Discharged to transitional care facility.    Progress towards therapy goal(s). See goals on Care Plan in [facility] electronic health record for goal details.  Goals not met.  Barriers to achieving goals:   discharge from facility.    Therapy recommendation(s):    Continued therapy is recommended.  Rationale/Recommendations:  Recommend continued OT at TCU to maximize independence and safety with I/ADLs.       How Severe Is Your Rash?: moderate Is This A New Presentation, Or A Follow-Up?: Rash

## 2021-08-12 NOTE — PROGRESS NOTES
A&O x 4, VSS on RA, pain controlled with oral analgesics, drsg CDI, CMS intact per baseline , up with assist of 1 WGB, voiding adequately in the bathrrom, IV pulled, to discharge to [facility], continue to monitor.

## 2021-08-12 NOTE — PROGRESS NOTES
Floridalma Cunningham  2021  POD #1    Doing well.  No immediate surgical complications identified.  Pain well-controlled.  Temperatures:  Current - Temp: 98.2  F (36.8  C); Max - Temp  Av  F (36.7  C)  Min: 97.3  F (36.3  C)  Max: 98.6  F (37  C)  Pulse range: Pulse  Av.8  Min: 81  Max: 98  Blood pressure range: Systolic (24hrs), Av , Min:104 , Max:155   ; Diastolic (24hrs), Av, Min:54, Max:94    CMS: intact  Labs:   Results for orders placed or performed during the hospital encounter of 21 (from the past 24 hour(s))   XR Knee Port Right 1/2 Views    Narrative    KNEE PORTABLE RIGHT ONE TO TWO VIEWS 2021 10:07 AM     HISTORY: Postoperative total knee.    COMPARISON: None.      Impression    IMPRESSION: Right total knee arthroplasty in place. Surgical drain in  place anteriorly. No evidence of complication.    KATHARINE COLLIER MD         SYSTEM ID:  [id number]   Hospitalist IP Consult: Requesting provider? Attending physician; Patient to be seen: Routine - within 24 hours; post op; Consultant may enter orders: Yes    Narrative    Toya Wilkerson PA-C     2021 11:48 AM  [facility]  Consult Note - Hospitalist Service     Date of Admission:  2021  Consult Requested by: Dr. Hancock  Reason for Consult: Management of HTN post-op    Assessment & Plan   Floridalma Cunningham is a 86 year old female admitted on 2021. She   has a past medical history of hypertension, hyperlipidemia and   PVCs who was admitted outpatient by orthopedics following a right   total knee arthroplasty.    Status post right total knee arthroplasty  -Postoperative day 0  -Routine postoperative cares and pain control defer to Ortho    Essential hypertension [hydrochlorothiazide 25 mg tablet daily   and lisinopril 20 mg tablet daily]  -Continue PTA lisinopril  -Hold hydrochlorothiazide, can likely resume on discharge    History of PVCs: Recent work-up by cardiology after noted during    a dental visit.  Zio patch with 18% PVCs.  TTE without   significant abnormalities.  Patient offered treatment with   beta-blocker but she declined    Hyperlipidemia  -Continue statin         The patient's care was discussed with the Patient.    Toya Wilkerson PA-C  [facility]  Securely message with the Vocera Web Console (learn more here)  Text page via [email or web address] Paging/Directory      Clinically Significant Risk Factors Present on Admission                   __________________________________________________________________  ____    Chief Complaint   Status post right total neuroplasty    History is obtained from the patient and review of EMR    History of Present Illness   Floridalma Cunningham is a 86 year old female with a past medical   history of hypertension, PVCs hyperlipidemia who was admitted to   the care of orthopedics following a right total knee   arthroplasty.  Procedure was performed by  under general   anesthesia.  Patient tolerated procedure well and EBL documented   is less than 10 mL.    Presently, she is evaluated in her hospital room.  Denies any   issues with postoperative pain.  No chest pain or shortness of   breath.  No nausea or vomiting.  She took both of her blood   pressure medicines today prior to surgery.  She is hopeful for   discharge to [facility] when she leave the hospital as she   currently lives independently.    Review of Systems   The 10 point Review of Systems is negative other than noted in   the HPI or here.     Past Medical History    I have reviewed this patient's medical history and updated it   with pertinent information if needed.   Past Medical History:   Diagnosis Date     Arthritis      Hyperlipidemia      Hypertension      PVC's (premature ventricular contractions)      RBBB (right bundle branch block)        Past Surgical History   I have reviewed this patient's surgical history and updated it   with pertinent information if  needed.  No past surgical history on file.    Social History   I have reviewed this patient's social history and updated it with   pertinent information if needed.  Social History     Tobacco Use     Smoking status: Former Smoker     Quit date: 1945     Years since quittin.2     Smokeless tobacco: Never Used     Tobacco comment: smoked for two years   Vaping Use     Vaping Use: Never used   Substance Use Topics     Alcohol use: Yes     Comment: rarely     Drug use: No       Family History   I have reviewed this patient's family history and updated it with   pertinent information if needed.  Family History   Problem Relation Age of Onset     Coronary Artery Disease Father          of MI AT AGE 59      Bladder Cancer Brother      Irregular heart beat Mother        Medications   I have reviewed this patient's current medications    Allergies   Allergies   Allergen Reactions     Sulfa Drugs        Physical Exam   Vital Signs: Temp: 97.6  F (36.4  C) Temp src: Oral BP: 113/63   Pulse: 84   Resp: 20 SpO2: 99 % O2 Device: None (Room air) Oxygen   Delivery: 2 LPM  Weight: 168 lbs 0 oz    Constitutional: Alert, resting comfortably in NAD  HEENT: Head normocephalic, atraumatic. Eyes sclera non icteric.   Respiratory: Normal effort, symmetric expansion, no crackles or   wheezing  Cardiovascular: RRR no murmurs   GI: Non distended, normal bowels sounds, no tenderness or   guarding  MSK: LE without edema. Dorsalis pedis pulse palpated bilaterally.   Ace wrap to RLE  Skin/Integumen: Clear  Neuro: CN II-XII grossly intact  Psych:  Alert and oriented x 3. Normal affect      Data   Results for orders placed or performed during the hospital   encounter of 21 (from the past 24 hour(s))   Potassium   Result Value Ref Range    Potassium 4.1 3.4 - 5.3 mmol/L   Sodium   Result Value Ref Range    Sodium 133 133 - 144 mmol/L   XR Knee Port Right 1/2 Views    Narrative    KNEE PORTABLE RIGHT ONE TO TWO VIEWS   2021 10:07 AM     HISTORY: Postoperative total knee.    COMPARISON: None.      Impression    IMPRESSION: Right total knee arthroplasty in place. Surgical   drain in  place anteriorly. No evidence of complication.      CBC with Platelets & Differential    Narrative    The following orders were created for panel order CBC with Platelets & Differential.  Procedure                               Abnormality         Status                     ---------                               -----------         ------                     CBC with platelets and d...[[id number]]  Abnormal            Final result                 Please view results for these tests on the individual orders.   Comprehensive metabolic panel   Result Value Ref Range    Sodium 132 (L) 133 - 144 mmol/L    Potassium 4.0 3.4 - 5.3 mmol/L    Chloride 101 94 - 109 mmol/L    Carbon Dioxide (CO2) 24 20 - 32 mmol/L    Anion Gap 7 3 - 14 mmol/L    Urea Nitrogen 13 7 - 30 mg/dL    Creatinine 0.79 0.52 - 1.04 mg/dL    Calcium 8.5 8.5 - 10.1 mg/dL    Glucose 80 70 - 99 mg/dL    Alkaline Phosphatase 76 40 - 150 U/L    AST 15 0 - 45 U/L    ALT 25 0 - 50 U/L    Protein Total 5.4 (L) 6.8 - 8.8 g/dL    Albumin 2.8 (L) 3.4 - 5.0 g/dL    Bilirubin Total 0.9 0.2 - 1.3 mg/dL    GFR Estimate 68 >60 mL/min/1.73m2   CBC with platelets and differential   Result Value Ref Range    WBC Count 13.1 (H) 4.0 - 11.0 10e3/uL    RBC Count 3.74 (L) 3.80 - 5.20 10e6/uL    Hemoglobin 11.2 (L) 11.7 - 15.7 g/dL    Hematocrit 33.8 (L) 35.0 - 47.0 %    MCV 90 78 - 100 fL    MCH 29.9 26.5 - 33.0 pg    MCHC 33.1 31.5 - 36.5 g/dL    RDW 13.9 10.0 - 15.0 %    Platelet Count 212 150 - 450 10e3/uL    % Neutrophils 79 %    % Lymphocytes 13 %    % Monocytes 6 %    % Eosinophils 1 %    % Basophils 0 %    % Immature Granulocytes 1 %    NRBCs per 100 WBC 0 <1 /100    Absolute Neutrophils 10.3 (H) 1.6 - 8.3 10e3/uL    Absolute Lymphocytes 1.7 0.8 - 5.3 10e3/uL    Absolute Monocytes 0.8 0.0 - 1.3 10e3/uL     Absolute Eosinophils 0.1 0.0 - 0.7 10e3/uL    Absolute Basophils 0.0 0.0 - 0.2 10e3/uL    Absolute Immature Granulocytes 0.1 (H) <=0.0 10e3/uL    Absolute NRBCs 0.0 10e3/uL       PLAN:    discharge  To [facility]    rxs  Done,  Paper work  Completed    shis  Or was severly limited pre op  So will need a bit longer time at [facility]  Then some  Mental  Status is  Fine  However.

## 2021-08-12 NOTE — PROGRESS NOTES
[facility]  Hospitalist Progress Note   08/12/2021          Assessment and Plan:       Floridalma Cunningham is a 86 year old female with history of hypertension, hyperlipidemia, PVCs admitted on 8/11/2021 after elective right total knee arthroplasty.  Hospitalist team following for PVCs, blood pressure management.    Status post right total knee arthroplasty 8/11/2021.  Defer postoperative care, pain management, pharmacological DVT prophylaxis, rehabilitation to surgical team.  On aspirin for pharmacological DVT prophylaxis per surgical team.  Recommend aggressive incentive spirometry.  Recommend early ambulation.  Minimize narcotic use as able to.  Bowel regimen as needed while on narcotics.    Leukocytosis likely reactive.  Patient having elevated WBCs on 8/9/2021 prior to surgery.  Postprocedure WBC 13.1.  Afebrile.  No new cough.  No urinary disturbance at this time.  No other signs or symptoms of infection.  Monitor for postop fever, follow-up WBC count in 5 days or earlier if symptomatic    Acute on chronic mild hyponatremia likely from fluid resuscitation.  Serum sodium 132 this morning.  Restart PTA hydrochlorothiazide.  Discontinued IV fluids.  Check BMP in 1 week or earlier if symptomatic.    Mild hypoalbuminemia likely competent of dilutional.  Serum albumin 2.8.  Restarted PTA diuretic as above.  Monitor levels periodically.    Acute anemia likely from surgical blood loss, competent of dilutional.  Baseline hemoglobin between 14-15.  Postop hemoglobin dropped 11.2.  No hypotension.  No bleeding or oozing at this time.  Monitor hemoglobin levels in 5 days or earlier if symptomatic.    Essential hypertension   Restart PTA hydrochlorothiazide at 12.5 mg today, increased to 25 mg PTA dose tomorrow.  Continue PTA lisinopril 20 mg oral daily.  Monitor blood pressure readings, review on provider visit.    History of PVCs: Recent work-up by cardiology after noted during a dental visit.  Zio patch with  18% PVCs.  TTE without significant abnormalities.    Patient offered treatment with beta-blocker but she declined.  Monitor.     Hyperlipidemia  Continue PTA statin     Orders Placed This Encounter      Advance Diet as Tolerated: Regular Diet Adult      Regular Diet Adult      Advance Diet as Tolerated      DVT Prophylaxis: Aspirin.  Code Status: Full Code  Disposition: Expected discharge per Ortho team    Discussed with patient, bedside RN, Ortho MAVERICK. Discharge instructions updated.  Total time greater than 25 minutes.  More than 60% of time spent in direct patient care, care coordination, patient counseling, and formalizing plan of care.     Payton Stover MD        Interval History:      Patient appears comfortable lying in bed.  Denies any chest pain or palpitation.  Afebrile since hospitalization.  No new tingling or numbness.  Jackson catheter removed, voiding.  Pain controlled with Tylenol.         Physical Exam:        Physical Exam   Temp:  [97.3  F (36.3  C)-98.6  F (37  C)] 98.2  F (36.8  C)  Pulse:  [81-98] 81  Resp:  [18] 18  BP: (116-155)/(56-94) 130/56  SpO2:  [96 %-99 %] 98 %    Intake/Output Summary (Last 24 hours) at 8/12/2021 1226  Last data filed at 8/12/2021 0700  Gross per 24 hour   Intake 800 ml   Output 550 ml   Net 250 ml       Admission Weight: 76.2 kg (168 lb)  Current Weight: 76.2 kg (168 lb)    PHYSICAL EXAM  GENERAL: Patient is in no distress. Alert and oriented.  HEART: Regular rate and rhythm. S1S2. No murmurs  LUNGS: Clear to auscultation bilaterally. No expiratory wheeze.  Respirations unlabored  ABDOMEN: Soft, no abdominal tenderness, bowel sounds heard   NEURO: Moving all extremities, no focal weakness.  EXTREMITIES: No pedal edema.  Right knee area dressing intact, Ace wraps present.  SKIN: Warm, dry.  PSYCHIATRY Cooperative       Medications:          acetaminophen  975 mg Oral Q8H     aspirin  162 mg Oral BID     famotidine  20 mg Oral Daily    Or     famotidine  20 mg  Intravenous Daily     hydrochlorothiazide  12.5 mg Oral Daily     lisinopril  20 mg Oral Daily     polyethylene glycol  17 g Oral Daily     senna-docusate  1 tablet Oral BID     sodium chloride (PF)  3 mL Intracatheter Q8H     trimethoprim  100 mg Oral Daily     [START ON 8/14/2021] acetaminophen, alum & mag hydroxide-simethicone, sore throat lozenge, bisacodyl, diphenhydrAMINE, HYDROcodone-acetaminophen, HYDROmorphone, hydrOXYzine, lidocaine 4%, lidocaine (buffered or not buffered), magnesium hydroxide, naloxone **OR** naloxone **OR** naloxone **OR** naloxone, ondansetron **OR** ondansetron, prochlorperazine **OR** prochlorperazine, sodium chloride (PF)         Data:      All new lab and imaging data was reviewed.

## 2021-08-12 NOTE — PROGRESS NOTES
Care Management Discharge Note    Discharge Date: 08/12/2021       Discharge Disposition: Transitional Care, Skilled Nursing Facilty    Discharge Services: Transportation Services    Discharge DME: None    Discharge Transportation: agency    Private pay costs discussed: transportation costs    PAS Confirmation Code: [id number]  Patient/family educated on Medicare website which has current facility and service quality ratings: no    Education Provided on the Discharge Plan:    Persons Notified of Discharge Plans: pt  Patient/Family in Agreement with the Plan: yes    Handoff Referral Completed: No    Additional Information:  Pt to discharge to [facility] at 10:30a via [facility] w/c. Orders faxed.    PAS-RR    D: Per DHS regulation, SW completed and submitted PAS-RR to [place] [employer] Direct Connect via the Senior LinkAge Line.  PAS-RR confirmation # is : [id number]    I: SW spoke with pt and they are aware a PAS-RR has been submitted.  SW reviewed with pt that they may be contacted for a follow up appointment within 10 days of hospital discharge if their SNF stay is < 30 days.  Contact information for Senior LinkAge Line was also provided.    A: pt verbalized understanding.    P: Further questions may be directed to [facility] LinkAge Line at #[phone], option #4 for PAS-RR staff.    RIC Hayden

## 2021-08-12 NOTE — CONSULTS
Care Management Initial Consult    General Information  Assessment completed with: Patient,         Primary Care Provider verified and updated as needed:     Readmission within the last 30 days:           Advance Care Planning: Advance Care Planning Reviewed: no concerns identified          Communication Assessment  Patient's communication style: spoken language (English or Bilingual)    Hearing Difficulty or Deaf: (P) no   Wear Glasses or Blind: (P) no    Cognitive  Cognitive/Neuro/Behavioral: WDL              Best Language: 0 - No aphasia       Living Environment:   People in home: alone     Current living Arrangements: (P) independent living facility      Able to return to prior arrangements:         Family/Social Support:  Care provided by: self  Provides care for: no one     Children          Description of Support System: Supportive, Involved         Current Resources:   Patient receiving home care services:       Community Resources:    Equipment currently used at home: (P) walker, rolling (reacher)  Supplies currently used at home:      Employment/Financial:  Employment Status:          Financial Concerns:             Lifestyle & Psychosocial Needs:  Social Determinants of Health     Tobacco Use: Medium Risk     Smoking Tobacco Use: Former Smoker     Smokeless Tobacco Use: Never Used   Alcohol Use:      Frequency of Alcohol Consumption:      Average Number of Drinks:      Frequency of Binge Drinking:    Financial Resource Strain:      Difficulty of Paying Living Expenses:    Food Insecurity:      Worried About Running Out of Food in the Last Year:      Ran Out of Food in the Last Year:    Transportation Needs:      Lack of Transportation (Medical):      Lack of Transportation (Non-Medical):    Physical Activity:      Days of Exercise per Week:      Minutes of Exercise per Session:    Stress:      Feeling of Stress :    Social Connections:      Frequency of Communication with Friends and Family:       Frequency of Social Gatherings with Friends and Family:      Attends Buddhism Services:      Active Member of Clubs or Organizations:      Attends Club or Organization Meetings:      Marital Status:    Intimate Partner Violence:      Fear of Current or Ex-Partner:      Emotionally Abused:      Physically Abused:      Sexually Abused:    Depression: Not at risk     PHQ-2 Score: 0   Housing Stability:      Unable to Pay for Housing in the Last Year:      Number of Places Lived in the Last Year:      Unstable Housing in the Last Year:        Functional Status:  Prior to admission patient needed assistance:   Dependent ADLs:: Independent, Ambulation-walker  Dependent IADLs:: Transportation       Mental Health Status:          Chemical Dependency Status:                Values/Beliefs:  Spiritual, Cultural Beliefs, Buddhism Practices, Values that affect care: no               Additional Information:  Pt lives in a senior apartment alone and is independents. Pts dtr assists with transport. Pt pre registered at [facility] and they have a bed. Pt would like transport set up.     RIC Hayden

## 2021-08-12 NOTE — PLAN OF CARE
Physical Therapy Discharge Summary    Reason for therapy discharge:    Discharged to transitional care facility.    Progress towards therapy goal(s). See goals on Care Plan in [facility] electronic health record for goal details.  Goals partially met.  Barriers to achieving goals:   limited tolerance for therapy and discharge from facility.    Therapy recommendation(s):    Continued therapy is recommended.  Rationale/Recommendations:  TCU to maximize return to PLOF.

## 2021-08-12 NOTE — PLAN OF CARE
[facility]      OUTPATIENT OCCUPATIONAL THERAPY  EVALUATION  PLAN OF TREATMENT FOR OUTPATIENT REHABILITATION  (COMPLETE FOR INITIAL CLAIMS ONLY)  Patient's Last Name, First Name, M.I.  YOB: 1935  Floridalma Cunningham                          Provider's Name  [facility] Medical Record No.  [id number]                               Onset Date:  08/11/21   Start of Care Date:  08/12/21     Type:     ___PT   _X_OT   ___SLP Medical Diagnosis:  R TKA                        OT Diagnosis:  Impaired independence with I/ADLs   Visits from SOC:  1   _________________________________________________________________________________  Plan of Treatment/Functional Goals    Planned Interventions: ADL retraining, bed mobility training, transfer training   Goals: See Occupational Therapy Goals on Care Plan in [facility] electronic health record.    Therapy Frequency: Daily  Predicted Duration of Therapy Intervention: 2 days  _________________________________________________________________________________    I CERTIFY THE NEED FOR THESE SERVICES FURNISHED UNDER        THIS PLAN OF TREATMENT AND WHILE UNDER MY CARE     (Physician co-signature of this document indicates review and certification of the therapy plan).                Certification date from: 08/12/21, Certification date to: 08/14/21    Referring Physician: Eamon Awan MD            Initial Assessment        See Occupational Therapy evaluation dated 08/12/21 in Epic electronic health record.

## 2021-08-13 ENCOUNTER — TRANSITIONAL CARE UNIT VISIT (OUTPATIENT)
Dept: GERIATRICS | Facility: CLINIC | Age: 86
End: 2021-08-13
Payer: COMMERCIAL

## 2021-08-13 VITALS
SYSTOLIC BLOOD PRESSURE: 165 MMHG | WEIGHT: 175 LBS | TEMPERATURE: 98.2 F | RESPIRATION RATE: 18 BRPM | OXYGEN SATURATION: 94 % | BODY MASS INDEX: 29.16 KG/M2 | HEART RATE: 94 BPM | HEIGHT: 65 IN | DIASTOLIC BLOOD PRESSURE: 98 MMHG

## 2021-08-13 DIAGNOSIS — I49.3 PVC'S (PREMATURE VENTRICULAR CONTRACTIONS): ICD-10-CM

## 2021-08-13 DIAGNOSIS — Z96.651 AFTERCARE FOLLOWING RIGHT KNEE JOINT REPLACEMENT SURGERY: ICD-10-CM

## 2021-08-13 DIAGNOSIS — D72.829 LEUKOCYTOSIS, UNSPECIFIED TYPE: ICD-10-CM

## 2021-08-13 DIAGNOSIS — K59.03 DRUG-INDUCED CONSTIPATION: ICD-10-CM

## 2021-08-13 DIAGNOSIS — N18.30 STAGE 3 CHRONIC KIDNEY DISEASE, UNSPECIFIED WHETHER STAGE 3A OR 3B CKD (H): ICD-10-CM

## 2021-08-13 DIAGNOSIS — D62 ANEMIA DUE TO BLOOD LOSS, ACUTE: ICD-10-CM

## 2021-08-13 DIAGNOSIS — Z47.1 AFTERCARE FOLLOWING RIGHT KNEE JOINT REPLACEMENT SURGERY: ICD-10-CM

## 2021-08-13 DIAGNOSIS — Z96.651 H/O TOTAL KNEE REPLACEMENT, RIGHT: ICD-10-CM

## 2021-08-13 DIAGNOSIS — M17.11 PRIMARY OSTEOARTHRITIS OF RIGHT KNEE: Primary | ICD-10-CM

## 2021-08-13 DIAGNOSIS — I10 ESSENTIAL HYPERTENSION: ICD-10-CM

## 2021-08-13 PROCEDURE — 99310 SBSQ NF CARE HIGH MDM 45: CPT | Performed by: NURSE PRACTITIONER

## 2021-08-13 RX ORDER — AMOXICILLIN 250 MG
1 CAPSULE ORAL 2 TIMES DAILY
Qty: 30 TABLET | Refills: 0
Start: 2021-08-13 | End: 2021-10-21

## 2021-08-13 ASSESSMENT — MIFFLIN-ST. JEOR: SCORE: 1234.67

## 2021-08-13 NOTE — LETTER
"    8/13/2021        RE: Floridalma Cunningham  [address]  [address]  [place] [address]        [facility] GERIATRIC SERVICES  PRIMARY CARE PROVIDER AND CLINIC:  Dinah Green MD, [address] [place] [address]  / [place] [address]  Chief Complaint   Patient presents with     Hospital F/U      [place] Medical Record Number:  [id number]  Place of Service where encounter took place:  [facility] (U) [25]    Floridalma Cunningham  is a 86 year old  (1935), admitted to the above facility from  [facility]. Hospital stay 8/11/21 through 8/12/21..   HPI:    PMH of HTN, HLD, and PVC's who was admitted for elective Right TKA  DJD s/p right TKA 8/11/21: minimize narcotic use  Leukocytosis: elevated WBc 8/9 prior to surgery, post proceduere WBC 13.1, afebrile no signs of infection  Hyponatremia: Na 132, restarted on PTA HCTZ monitor as OP  Anemia: Hgb 14--> 11.2, no intervention   Hx of PVC's: zio patch with 18% PVCs, TTE no significant abnormalitis, patient offered Tx with beta blocker but she declined.   On exam today : patient laying in bed, states right knee pain is 0/10 at rest, increases with activity, patient states she is able to participate in therapy, denies fever, chills, CP, palpitations, SOB, N/V/D, states she does not think she has had a BM since surgery but she is \"usually loose\" denies abdominal discomfort, states she slept well last night.    CODE STATUS/ADVANCE DIRECTIVES DISCUSSION:  Prior  CPR/Full code   ALLERGIES:   Allergies   Allergen Reactions     Sulfa Drugs       PAST MEDICAL HISTORY:   Past Medical History:   Diagnosis Date     Arthritis      Hyperlipidemia      Hypertension      PVC's (premature ventricular contractions)      RBBB (right bundle branch block)       PAST SURGICAL HISTORY:   has a past surgical history that includes Arthroplasty knee (Right, 8/11/2021).  FAMILY HISTORY: family history includes Bladder Cancer in her brother; Coronary Artery Disease " in her father; Irregular heart beat in her mother.  SOCIAL HISTORY:   reports that she quit smoking about 76 years ago. She has never used smokeless tobacco. She reports current alcohol use. She reports that she does not use drugs.  Patient's living condition: lives alone    Post Discharge Medication Reconciliation Status: discharge medications reconciled, continue medications without change  Current Outpatient Medications   Medication Sig     acetaminophen (TYLENOL) 325 MG tablet Take 2 tablets (650 mg) by mouth every 4 hours as needed for other (mild pain)     aspirin 81 MG EC tablet Take 2 tablets (162 mg) by mouth 2 times daily     atorvastatin (LIPITOR) 10 MG tablet Take 1 tablet (10 mg) by mouth daily     CRANBERRY PO Take 1 tablet by mouth daily     hydrochlorothiazide (HYDRODIURIL) 25 MG tablet Take 1 tablet (25 mg) by mouth daily     HYDROcodone-acetaminophen (NORCO) 5-325 MG tablet Take 1-2 tablets by mouth every 6 hours as needed for moderate to severe pain 1 if pain rated 1-5 & 2 if 6-10.     hydrOXYzine (ATARAX) 10 MG tablet Take 1 tablet (10 mg) by mouth every 6 hours as needed for itching or anxiety (with pain, moderate pain)     lisinopril (ZESTRIL) 20 MG tablet Take 1 tablet (20 mg) by mouth daily     multivitamin w/minerals (MULTI-VITAMIN) tablet Take 1 tablet by mouth daily     Omega-3 Fatty Acids (FISH OIL PO)      ondansetron (ZOFRAN-ODT) 4 MG ODT tab Take 1-2 tablets (4-8 mg) by mouth every 8 hours as needed for nausea Dissolve ON the tongue.     ondansetron (ZOFRAN-ODT) 4 MG ODT tab Take 1 tablet (4 mg) by mouth every 30 minutes as needed for nausea     polyethylene glycol (MIRALAX) 17 g packet Take 17 g by mouth daily     senna-docusate (SENOKOT-S/PERICOLACE) 8.6-50 MG tablet Take 1-2 tablets by mouth 2 times daily Take while on oral narcotics to prevent or treat constipation.     trimethoprim (TRIMPEX) 100 MG tablet Take 1 tablet (100 mg) by mouth daily     No current facility-administered  "medications for this visit.       ROS:  10 point ROS of systems including Constitutional, Eyes, Respiratory, Cardiovascular, Gastroenterology, Genitourinary, Integumentary, Musculoskeletal, Psychiatric were all negative except for pertinent positives noted in my HPI.    Vitals:  BP (!) 165/98   Pulse 94   Temp 98.2  F (36.8  C)   Resp 18   Ht 1.651 m (5' 5\")   Wt 79.4 kg (175 lb)   SpO2 94%   BMI 29.12 kg/m    Exam:  GENERAL APPEARANCE:  Alert, in no distress  ENT:  Mouth and posterior oropharynx normal, moist mucous membranes, normal hearing acuity  EYES:  EOM, conjunctivae, lids, pupils and irises normal, PERRL  RESP:  respiratory effort and palpation of chest normal, lungs clear to auscultation , no respiratory distress  CV:  Palpation and auscultation of heart done , regular rate and rhythm, no murmur, rub, or gallop, peripheral edema trace+ in RLE  ABDOMEN:  normal bowel sounds, soft, nontender, no hepatosplenomegaly or other masses  M/S:   patient resting in bed, able to move all 4 extremities  SKIN:  Inspection of skin and subcutaneous tissue baseline, did not visualize right knee incision  NEURO:   speech wnl    Lab/Diagnostic data:    Most Recent 3 CBC's:Recent Labs   Lab Test 08/12/21  0702 08/09/21  1310 05/20/21  1115   WBC 13.1* 12.2* 13.9*   HGB 11.2* 15.0 15.1   MCV 90 90 92    274 238     Most Recent 3 BMP's:Recent Labs   Lab Test 08/12/21  0702 08/11/21  0630 08/09/21  1310 05/20/21  1115   * 133 131* 140   POTASSIUM 4.0 4.1 4.0 4.3   CHLORIDE 101  --  97 106   CO2 24  --  24 30   BUN 13  --  12 19   CR 0.79  --  0.98 0.80   ANIONGAP 7  --  10 4   VLADIMIR 8.5  --  9.5 9.5   GLC 80  --  131* 90       ASSESSMENT/PLAN:  Primary osteoarthritis of right knee  Aftercare following right knee joint replacement surgery  Acute/ongoing: PT and OT, tylenol 650mg TID scheduled, norco 5/325mg 1-2 tabs PO q 6 hours prn, ASA 162mg BID  F/u with ortho as directed    Anemia due to blood loss, " acute  Acute/ongoing: Hgb On tuesday    Leukocytosis, unspecified type  Acute: CBC on Tuesday    Essential hypertension  Stage 3 chronic kidney disease, unspecified whether stage 3a or 3b CKD  PVC's (premature ventricular contractions)  Ongoing: vitals daily and prn, BMP follow, continue vitals daily and prn, lisinorpril 20mg QD, HCTZ 25mg QD,  Declined beta blocker    Drug-induced constipation  Acute/ongoing: senna s 1 PO BID prn       Orders:  Tylenol 650mg TID scheduled  CBC and BMP on Tuesday  Senna s 1 PO BID prn      Total time spent with patient visit at the [facility] nursing facility was 35 min including patient visit and review of past records. Greater than 50% of total time spent with counseling and coordinating care due to discussed pain control, discussed scheduled tylenol, encouraged use of ice prn, discussed lab monitoring. .     Electronically signed by:  Tonya Lynn Haase, APRN CNP                     Sincerely,        Tonya Lynn Haase, APRN CNP

## 2021-08-13 NOTE — PROGRESS NOTES
"[facility] GERIATRIC SERVICES  PRIMARY CARE PROVIDER AND CLINIC:  Dinah Green MD, [address] [facility]  / [place] [address]  Chief Complaint   Patient presents with     Hospital F/U      [place] Medical Record Number:  [id number]  Place of Service where encounter took place:  [facility] ([facility]) [25]    Floridalma Cunningham  is a 86 year old  (1935), admitted to the above facility from  [facility]. Hospital stay 8/11/21 through 8/12/21..   HPI:    PMH of HTN, HLD, and PVC's who was admitted for elective Right TKA  DJD s/p right TKA 8/11/21: minimize narcotic use  Leukocytosis: elevated WBc 8/9 prior to surgery, post proceduere WBC 13.1, afebrile no signs of infection  Hyponatremia: Na 132, restarted on PTA HCTZ monitor as OP  Anemia: Hgb 14--> 11.2, no intervention   Hx of PVC's: zio patch with 18% PVCs, TTE no significant abnormalitis, patient offered Tx with beta blocker but she declined.   On exam today : patient laying in bed, states right knee pain is 0/10 at rest, increases with activity, patient states she is able to participate in therapy, denies fever, chills, CP, palpitations, SOB, N/V/D, states she does not think she has had a BM since surgery but she is \"usually loose\" denies abdominal discomfort, states she slept well last night.    CODE STATUS/ADVANCE DIRECTIVES DISCUSSION:  Prior  CPR/Full code   ALLERGIES:   Allergies   Allergen Reactions     Sulfa Drugs       PAST MEDICAL HISTORY:   Past Medical History:   Diagnosis Date     Arthritis      Hyperlipidemia      Hypertension      PVC's (premature ventricular contractions)      RBBB (right bundle branch block)       PAST SURGICAL HISTORY:   has a past surgical history that includes Arthroplasty knee (Right, 8/11/2021).  FAMILY HISTORY: family history includes Bladder Cancer in her brother; Coronary Artery Disease in her father; Irregular heart beat in her mother.  SOCIAL HISTORY:   reports that she quit smoking about " 76 years ago. She has never used smokeless tobacco. She reports current alcohol use. She reports that she does not use drugs.  Patient's living condition: lives alone    Post Discharge Medication Reconciliation Status: discharge medications reconciled, continue medications without change  Current Outpatient Medications   Medication Sig     acetaminophen (TYLENOL) 325 MG tablet Take 2 tablets (650 mg) by mouth every 4 hours as needed for other (mild pain)     aspirin 81 MG EC tablet Take 2 tablets (162 mg) by mouth 2 times daily     atorvastatin (LIPITOR) 10 MG tablet Take 1 tablet (10 mg) by mouth daily     CRANBERRY PO Take 1 tablet by mouth daily     hydrochlorothiazide (HYDRODIURIL) 25 MG tablet Take 1 tablet (25 mg) by mouth daily     HYDROcodone-acetaminophen (NORCO) 5-325 MG tablet Take 1-2 tablets by mouth every 6 hours as needed for moderate to severe pain 1 if pain rated 1-5 & 2 if 6-10.     hydrOXYzine (ATARAX) 10 MG tablet Take 1 tablet (10 mg) by mouth every 6 hours as needed for itching or anxiety (with pain, moderate pain)     lisinopril (ZESTRIL) 20 MG tablet Take 1 tablet (20 mg) by mouth daily     multivitamin w/minerals (MULTI-VITAMIN) tablet Take 1 tablet by mouth daily     Omega-3 Fatty Acids (FISH OIL PO)      ondansetron (ZOFRAN-ODT) 4 MG ODT tab Take 1-2 tablets (4-8 mg) by mouth every 8 hours as needed for nausea Dissolve ON the tongue.     ondansetron (ZOFRAN-ODT) 4 MG ODT tab Take 1 tablet (4 mg) by mouth every 30 minutes as needed for nausea     polyethylene glycol (MIRALAX) 17 g packet Take 17 g by mouth daily     senna-docusate (SENOKOT-S/PERICOLACE) 8.6-50 MG tablet Take 1-2 tablets by mouth 2 times daily Take while on oral narcotics to prevent or treat constipation.     trimethoprim (TRIMPEX) 100 MG tablet Take 1 tablet (100 mg) by mouth daily     No current facility-administered medications for this visit.       ROS:  10 point ROS of systems including Constitutional, Eyes,  "Respiratory, Cardiovascular, Gastroenterology, Genitourinary, Integumentary, Musculoskeletal, Psychiatric were all negative except for pertinent positives noted in my HPI.    Vitals:  BP (!) 165/98   Pulse 94   Temp 98.2  F (36.8  C)   Resp 18   Ht 1.651 m (5' 5\")   Wt 79.4 kg (175 lb)   SpO2 94%   BMI 29.12 kg/m    Exam:  GENERAL APPEARANCE:  Alert, in no distress  ENT:  Mouth and posterior oropharynx normal, moist mucous membranes, normal hearing acuity  EYES:  EOM, conjunctivae, lids, pupils and irises normal, PERRL  RESP:  respiratory effort and palpation of chest normal, lungs clear to auscultation , no respiratory distress  CV:  Palpation and auscultation of heart done , regular rate and rhythm, no murmur, rub, or gallop, peripheral edema trace+ in RLE  ABDOMEN:  normal bowel sounds, soft, nontender, no hepatosplenomegaly or other masses  M/S:   patient resting in bed, able to move all 4 extremities  SKIN:  Inspection of skin and subcutaneous tissue baseline, did not visualize right knee incision  NEURO:   speech wnl    Lab/Diagnostic data:    Most Recent 3 CBC's:Recent Labs   Lab Test 08/12/21  0702 08/09/21  1310 05/20/21  1115   WBC 13.1* 12.2* 13.9*   HGB 11.2* 15.0 15.1   MCV 90 90 92    274 238     Most Recent 3 BMP's:Recent Labs   Lab Test 08/12/21  0702 08/11/21  0630 08/09/21  1310 05/20/21  1115   * 133 131* 140   POTASSIUM 4.0 4.1 4.0 4.3   CHLORIDE 101  --  97 106   CO2 24  --  24 30   BUN 13  --  12 19   CR 0.79  --  0.98 0.80   ANIONGAP 7  --  10 4   VLADIMIR 8.5  --  9.5 9.5   GLC 80  --  131* 90       ASSESSMENT/PLAN:  Primary osteoarthritis of right knee  Aftercare following right knee joint replacement surgery  Acute/ongoing: PT and OT, tylenol 650mg TID scheduled, norco 5/325mg 1-2 tabs PO q 6 hours prn, ASA 162mg BID  F/u with ortho as directed    Anemia due to blood loss, acute  Acute/ongoing: Hgb On tuesday    Leukocytosis, unspecified type  Acute: CBC on " Tuesday    Essential hypertension  Stage 3 chronic kidney disease, unspecified whether stage 3a or 3b CKD  PVC's (premature ventricular contractions)  Ongoing: vitals daily and prn, BMP follow, continue vitals daily and prn, lisinorpril 20mg QD, HCTZ 25mg QD,  Declined beta blocker    Drug-induced constipation  Acute/ongoing: senna s 1 PO BID prn       Orders:  Tylenol 650mg TID scheduled  CBC and BMP on Tuesday  Senna s 1 PO BID prn      Total time spent with patient visit at the skilled nursing facility was 35 min including patient visit and review of past records. Greater than 50% of total time spent with counseling and coordinating care due to discussed pain control, discussed scheduled tylenol, encouraged use of ice prn, discussed lab monitoring. .     Electronically signed by:  Tonya Lynn Haase, APRN CNP

## 2021-08-16 ENCOUNTER — TELEPHONE (OUTPATIENT)
Dept: GERIATRICS | Facility: CLINIC | Age: 86
End: 2021-08-16

## 2021-08-16 DIAGNOSIS — Z96.651 TOTAL KNEE REPLACEMENT STATUS, RIGHT: ICD-10-CM

## 2021-08-16 RX ORDER — HYDROCODONE BITARTRATE AND ACETAMINOPHEN 5; 325 MG/1; MG/1
1-2 TABLET ORAL EVERY 6 HOURS PRN
Qty: 45 TABLET | Refills: 0 | Status: SHIPPED | OUTPATIENT
Start: 2021-08-16 | End: 2021-08-18

## 2021-08-17 ENCOUNTER — TRANSFERRED RECORDS (OUTPATIENT)
Dept: HEALTH INFORMATION MANAGEMENT | Facility: CLINIC | Age: 86
End: 2021-08-17

## 2021-08-17 ENCOUNTER — TELEPHONE (OUTPATIENT)
Dept: GERIATRICS | Facility: CLINIC | Age: 86
End: 2021-08-17

## 2021-08-17 LAB
ANION GAP SERPL CALC-SCNC: 10 MMOL/L (ref 7–16)
BUN SERPL-MCNC: 13 MG/DL (ref 7–26)
CALCIUM (EXTERNAL): 9.1 MG/DL (ref 8.4–10.4)
CHLORIDE (EXTERNAL): 96 MMOL/L (ref 98–109)
CO2 (EXTERNAL): 23 MMOL/L (ref 20–29)
CREATININE (EXTERNAL): 0.51 MG/DL (ref 0.55–1.02)
DIFFERENTIAL: ABNORMAL
ERYTHROCYTE [DISTWIDTH] IN BLOOD BY AUTOMATED COUNT: 13.9 % (ref 11.9–15.5)
GFR ESTIMATED (EXTERNAL): >60 ML/MIN/1.73M2
GLUCOSE (EXTERNAL): 89 MG/DL (ref 70–100)
HEMATOCRIT (EXTERNAL): 35.9 % (ref 34.9–44.5)
HEMOGLOBIN (EXTERNAL): 11.7 G/DL (ref 12–15.5)
MCH RBC QN AUTO: 30 PG (ref 27.6–33.3)
MCHC RBC AUTO-ENTMCNC: 32.6 G/DL (ref 31.5–35.2)
MCV RBC AUTO: 92.1 FL (ref 80–100)
PLATELET COUNT (EXTERNAL): 340 10E3/UL (ref 150–450)
POTASSIUM (EXTERNAL): 4.4 MMOL/L (ref 3.5–5.1)
RBC # BLD AUTO: 3.9 10E6/UL (ref 3.9–5.03)
SODIUM (EXTERNAL): 129 MMOL/L (ref 136–145)
WBC COUNT (EXTERNAL): 10.4 10E3/UL (ref 3.5–10.5)

## 2021-08-17 ASSESSMENT — MIFFLIN-ST. JEOR: SCORE: 1226.96

## 2021-08-17 NOTE — PROGRESS NOTES
[facility] GERIATRIC SERVICES DISCHARGE SUMMARY  PATIENT'S NAME: Floridalma Cunningham  YOB: 1935  MEDICAL RECORD NUMBER:  [id number]  Place of Service where encounter took place:  [facility] (U) [25]    PRIMARY CARE PROVIDER AND CLINIC RESPONSIBLE AFTER TRANSFER:   Dinah Green MD, [address] [place] [address]  / [place] [address]    [facility] Provider     Transferring providers: Tonya Lynn Haase, APRN CNP, Dr. Roldan Esteves MD  Recent Hospitalization/ED:  [facility] stay 8/11/21 to 8/12/21.  Date of SNF Admission: August 12, 2021  Date of SNF (anticipated) Discharge: August 23, 2021  Discharged to: previous independent home  Cognitive Scores: BIMS: 15/15 and Short blessed: 4/28  Physical Function: Ambulating 300 ft with RW independently  DME: Walker    CODE STATUS/ADVANCE DIRECTIVES DISCUSSION:  Prior Full Code   ALLERGIES: Sulfa drugs    NURSING FACILITY COURSE   Medication Changes/Rationale:     There were no medication changes made during TCU stay    Summary of nursing facility stay:   Patient progressed to walking > 300 feet using a RW independently, independent with aDL's, toileting. Patient will DC to home with     Discharge Medications:    Current Outpatient Medications   Medication Sig Dispense Refill     acetaminophen (TYLENOL) 325 MG tablet Take 650 mg by mouth 3 times daily       aspirin 81 MG EC tablet Take 2 tablets (162 mg) by mouth 2 times daily 120 tablet 0     atorvastatin (LIPITOR) 10 MG tablet Take 1 tablet (10 mg) by mouth daily 90 tablet 1     hydrochlorothiazide (HYDRODIURIL) 25 MG tablet Take 1 tablet (25 mg) by mouth daily 90 tablet 1     lisinopril (ZESTRIL) 20 MG tablet Take 1 tablet (20 mg) by mouth daily 90 tablet 1     multivitamin w/minerals (MULTI-VITAMIN) tablet Take 1 tablet by mouth daily       Omega-3 Fatty Acids (FISH OIL PO) Take 1,000 mg by mouth daily        polyethylene glycol (MIRALAX) 17 g packet Take 17 g by mouth daily 7 packet  "0     senna-docusate (SENOKOT-S/PERICOLACE) 8.6-50 MG tablet Take 1 tablet by mouth 2 times daily Take while on oral narcotics to prevent or treat constipation. 30 tablet 0     trimethoprim (TRIMPEX) 100 MG tablet Take 1 tablet (100 mg) by mouth daily 90 tablet 1     CRANBERRY PO Take 1 tablet by mouth daily            Controlled medications:   none     Past Medical History:   Past Medical History:   Diagnosis Date     Arthritis      Hyperlipidemia      Hypertension      PVC's (premature ventricular contractions)      RBBB (right bundle branch block)      Physical Exam:   Vitals: /69   Pulse 79   Temp (!) 96.6  F (35.9  C)   Resp 17   Ht 1.651 m (5' 5\")   Wt 78.6 kg (173 lb 4.8 oz)   SpO2 94%   BMI 28.84 kg/m    BMI= Body mass index is 28.84 kg/m .  GENERAL APPEARANCE:  Alert, in no distress  ENT:  Mouth and posterior oropharynx normal, moist mucous membranes, normal hearing acuity  EYES:  EOM, conjunctivae, lids, pupils and irises normal, PERRL  RESP:  no respiratory distress  CV:  peripheral edema trace+ in LE bilaterally  ABDOMEN:  abdomen round  M/S:   patient sitting up in w/c, able to move all 4 extremities  SKIN:  Inspection of skin and subcutaneous tissue baseline  NEURO:   speech WNL  PSYCH:  affect and mood normal     SNF labs:   Most Recent 3 CBC's:Recent Labs   Lab Test 08/12/21  0702 08/09/21  1310 05/20/21  1115   WBC 13.1* 12.2* 13.9*   HGB 11.2* 15.0 15.1   MCV 90 90 92    274 238     Most Recent 3 BMP's:Recent Labs   Lab Test 08/12/21  0702 08/11/21  0630 08/09/21  1310 05/20/21  1115   * 133 131* 140   POTASSIUM 4.0 4.1 4.0 4.3   CHLORIDE 101  --  97 106   CO2 24  --  24 30   BUN 13  --  12 19   CR 0.79  --  0.98 0.80   ANIONGAP 7  --  10 4   VLADIMIR 8.5  --  9.5 9.5   GLC 80  --  131* 90     Primary osteoarthritis of right knee  Aftercare following right knee joint replacement surgery  Acute/ongoing: DC to home with continue tylenol 650mg TID scheduled, DC norco   ASA 162mg " BID  F/u with ortho as directed     Anemia due to blood loss, acute  Acute/ongoing: f/u with PCP     Leukocytosis, unspecified type  Acute: resolved, f/u with PCP     Essential hypertension  Stage 3 chronic kidney disease, unspecified whether stage 3a or 3b CKD  PVC's (premature ventricular contractions)  Ongoing: continue lisinopril 20mg QD, HCTZ 25mg QD,  Declined beta blocker     Drug-induced constipation  Acute/ongoing: senna s 1 PO BID prn         DISCHARGE PLAN:    Follow up labs: No labs orders/due    Medical Follow Up:      Follow up with primary care provider in 1 weeks    [facility] scheduled appointments:     Future Appointments   Date Time Provider Department Center   No future appts.       Discharge Services: Home Care:  Physical Therapy and Home Health Aide    Discharge Instructions Verbalized to Patient at Discharge:     None    TOTAL DISCHARGE TIME:   Greater than 30 minutes  Electronically signed by:  Tonya Lynn Haase, APRN CNP

## 2021-08-18 ENCOUNTER — DISCHARGE SUMMARY NURSING HOME (OUTPATIENT)
Dept: GERIATRICS | Facility: CLINIC | Age: 86
End: 2021-08-18
Payer: COMMERCIAL

## 2021-08-18 VITALS
SYSTOLIC BLOOD PRESSURE: 111 MMHG | OXYGEN SATURATION: 94 % | TEMPERATURE: 96.6 F | HEART RATE: 79 BPM | RESPIRATION RATE: 17 BRPM | BODY MASS INDEX: 28.87 KG/M2 | DIASTOLIC BLOOD PRESSURE: 69 MMHG | HEIGHT: 65 IN | WEIGHT: 173.3 LBS

## 2021-08-18 DIAGNOSIS — M17.11 PRIMARY OSTEOARTHRITIS OF RIGHT KNEE: Primary | ICD-10-CM

## 2021-08-18 DIAGNOSIS — N18.30 STAGE 3 CHRONIC KIDNEY DISEASE, UNSPECIFIED WHETHER STAGE 3A OR 3B CKD (H): ICD-10-CM

## 2021-08-18 DIAGNOSIS — K59.03 DRUG-INDUCED CONSTIPATION: ICD-10-CM

## 2021-08-18 DIAGNOSIS — I10 ESSENTIAL HYPERTENSION: ICD-10-CM

## 2021-08-18 DIAGNOSIS — Z96.651 TOTAL KNEE REPLACEMENT STATUS, RIGHT: ICD-10-CM

## 2021-08-18 DIAGNOSIS — D72.829 LEUKOCYTOSIS, UNSPECIFIED TYPE: ICD-10-CM

## 2021-08-18 DIAGNOSIS — D62 ANEMIA DUE TO BLOOD LOSS, ACUTE: ICD-10-CM

## 2021-08-18 PROCEDURE — 99316 NF DSCHRG MGMT 30 MIN+: CPT | Performed by: NURSE PRACTITIONER

## 2021-08-18 RX ORDER — ACETAMINOPHEN 325 MG/1
650 TABLET ORAL EVERY 8 HOURS PRN
COMMUNITY
End: 2023-08-07

## 2021-08-18 RX ORDER — ONDANSETRON 4 MG/1
4 TABLET, FILM COATED ORAL EVERY 6 HOURS PRN
COMMUNITY
End: 2021-08-18

## 2021-08-18 NOTE — LETTER
8/18/2021        RE: Floridalma Cunningham  [address]  [address]  [place] [address]        [facility] GERIATRIC SERVICES DISCHARGE SUMMARY  PATIENT'S NAME: Floridalma Cunningham  YOB: 1935  MEDICAL RECORD NUMBER:  [id number]  Place of Service where encounter took place:  [facility] (U) [25]    PRIMARY CARE PROVIDER AND CLINIC RESPONSIBLE AFTER TRANSFER:   Dinah Green MD, [address] [place] [address]  / [place] [address]    [facility] Provider     Transferring providers: Tonya Lynn Haase, APRN CNP, Dr. Roldan Esteves MD  Recent Hospitalization/ED:  [facility] stay 8/11/21 to 8/12/21.  Date of SNF Admission: August 12, 2021  Date of SNF (anticipated) Discharge: August 23, 2021  Discharged to: previous independent home  Cognitive Scores: BIMS: 15/15 and Short blessed: 4/28  Physical Function: Ambulating 300 ft with RW independently  DME: Walker    CODE STATUS/ADVANCE DIRECTIVES DISCUSSION:  Prior Full Code   ALLERGIES: Sulfa drugs    NURSING FACILITY COURSE   Medication Changes/Rationale:     There were no medication changes made during TCU stay    Summary of nursing facility stay:   Patient progressed to walking > 300 feet using a RW independently, independent with aDL's, toileting. Patient will DC to home with     Discharge Medications:    Current Outpatient Medications   Medication Sig Dispense Refill     acetaminophen (TYLENOL) 325 MG tablet Take 650 mg by mouth 3 times daily       aspirin 81 MG EC tablet Take 2 tablets (162 mg) by mouth 2 times daily 120 tablet 0     atorvastatin (LIPITOR) 10 MG tablet Take 1 tablet (10 mg) by mouth daily 90 tablet 1     hydrochlorothiazide (HYDRODIURIL) 25 MG tablet Take 1 tablet (25 mg) by mouth daily 90 tablet 1     lisinopril (ZESTRIL) 20 MG tablet Take 1 tablet (20 mg) by mouth daily 90 tablet 1     multivitamin w/minerals (MULTI-VITAMIN) tablet Take 1 tablet by mouth daily       Omega-3 Fatty Acids (FISH OIL PO) Take  "1,000 mg by mouth daily        polyethylene glycol (MIRALAX) 17 g packet Take 17 g by mouth daily 7 packet 0     senna-docusate (SENOKOT-S/PERICOLACE) 8.6-50 MG tablet Take 1 tablet by mouth 2 times daily Take while on oral narcotics to prevent or treat constipation. 30 tablet 0     trimethoprim (TRIMPEX) 100 MG tablet Take 1 tablet (100 mg) by mouth daily 90 tablet 1     CRANBERRY PO Take 1 tablet by mouth daily            Controlled medications:   none     Past Medical History:   Past Medical History:   Diagnosis Date     Arthritis      Hyperlipidemia      Hypertension      PVC's (premature ventricular contractions)      RBBB (right bundle branch block)      Physical Exam:   Vitals: /69   Pulse 79   Temp (!) 96.6  F (35.9  C)   Resp 17   Ht 1.651 m (5' 5\")   Wt 78.6 kg (173 lb 4.8 oz)   SpO2 94%   BMI 28.84 kg/m    BMI= Body mass index is 28.84 kg/m .  GENERAL APPEARANCE:  Alert, in no distress  ENT:  Mouth and posterior oropharynx normal, moist mucous membranes, normal hearing acuity  EYES:  EOM, conjunctivae, lids, pupils and irises normal, PERRL  RESP:  no respiratory distress  CV:  peripheral edema trace+ in LE bilaterally  ABDOMEN:  abdomen round  M/S:   patient sitting up in w/c, able to move all 4 extremities  SKIN:  Inspection of skin and subcutaneous tissue baseline  NEURO:   speech WNL  PSYCH:  affect and mood normal     SNF labs:   Most Recent 3 CBC's:Recent Labs   Lab Test 08/12/21  0702 08/09/21  1310 05/20/21  1115   WBC 13.1* 12.2* 13.9*   HGB 11.2* 15.0 15.1   MCV 90 90 92    274 238     Most Recent 3 BMP's:Recent Labs   Lab Test 08/12/21  0702 08/11/21  0630 08/09/21  1310 05/20/21  1115   * 133 131* 140   POTASSIUM 4.0 4.1 4.0 4.3   CHLORIDE 101  --  97 106   CO2 24  --  24 30   BUN 13  --  12 19   CR 0.79  --  0.98 0.80   ANIONGAP 7  --  10 4   VLADIMIR 8.5  --  9.5 9.5   GLC 80  --  131* 90     Primary osteoarthritis of right knee  Aftercare following right knee joint " replacement surgery  Acute/ongoing: DC to home with continue tylenol 650mg TID scheduled, DC norco   ASA 162mg BID  F/u with ortho as directed     Anemia due to blood loss, acute  Acute/ongoing: f/u with PCP     Leukocytosis, unspecified type  Acute: resolved, f/u with PCP     Essential hypertension  Stage 3 chronic kidney disease, unspecified whether stage 3a or 3b CKD  PVC's (premature ventricular contractions)  Ongoing: continue lisinopril 20mg QD, HCTZ 25mg QD,  Declined beta blocker     Drug-induced constipation  Acute/ongoing: senna s 1 PO BID prn         DISCHARGE PLAN:    Follow up labs: No labs orders/due    Medical Follow Up:      Follow up with primary care provider in 1 weeks    Current [facility] scheduled appointments:     Future Appointments   Date Time Provider Department Center   No future appts.       Discharge Services: Home Care:  Physical Therapy and Home Health Aide    Discharge Instructions Verbalized to Patient at Discharge:     None    TOTAL DISCHARGE TIME:   Greater than 30 minutes  Electronically signed by:  Tonya Lynn Haase, APRN CNP                     Sincerely,        Tonya Lynn Haase, APRN CNP

## 2021-08-18 NOTE — TELEPHONE ENCOUNTER
[facility] Nurse Triage Telephone Note    Provider: Tonya Haase, APRN CNP  Facility: [facility] Facility Type:  TCU    Caller: Elif  Call Back Number: [phone]    Allergies:    Allergies   Allergen Reactions     Sulfa Drugs         Reason for call: Hgb 11.7 (12.5),  not on fluid restriction. K 4.4, Cr 0.51, GFR >60.  Pt is have bleeding from her TKA incision with bruising & swelling & we are calling Ortho Is on  BID..    Verbal Order/Direction given by Provider: NNO    Provider giving Order:  Tonya Haase, APRN CNP    Verbal Order given to: Elif Neal RN

## 2021-08-24 ENCOUNTER — TELEPHONE (OUTPATIENT)
Dept: FAMILY MEDICINE | Facility: CLINIC | Age: 86
End: 2021-08-24

## 2021-08-24 DIAGNOSIS — N39.0 RECURRENT UTI: ICD-10-CM

## 2021-08-24 DIAGNOSIS — E78.5 HYPERLIPIDEMIA LDL GOAL <130: ICD-10-CM

## 2021-08-24 DIAGNOSIS — I10 ESSENTIAL HYPERTENSION: ICD-10-CM

## 2021-08-24 NOTE — TELEPHONE ENCOUNTER
Elsy from [facility] received an order for home care dated 8/20 and would like to delay care for nursing, skilled nursing and PT/OT.  Gave verbal order to Nancy lin to delay care.    Rocío CLEANING RN  EP Triage

## 2021-08-24 NOTE — TELEPHONE ENCOUNTER
Bonnie RICE from [facility] health calling     Requesting delay of start of home care; pt currently scheduled for 8-26-21.    Pt is declining nursing home care at this time. Will only see PT and Home Health Aide.     Okay to call back Bonnie and leave detailed VM; confidential line.       Liliana Ramirez RN  .

## 2021-08-25 ENCOUNTER — TELEPHONE (OUTPATIENT)
Dept: FAMILY MEDICINE | Facility: CLINIC | Age: 86
End: 2021-08-25

## 2021-08-25 RX ORDER — HYDROCHLOROTHIAZIDE 25 MG/1
TABLET ORAL
Qty: 90 TABLET | Refills: 0 | Status: ON HOLD | OUTPATIENT
Start: 2021-08-25 | End: 2021-11-04

## 2021-08-25 RX ORDER — TRIMETHOPRIM 100 MG/1
TABLET ORAL
Qty: 90 TABLET | Refills: 0 | Status: SHIPPED | OUTPATIENT
Start: 2021-08-25 | End: 2021-12-28

## 2021-08-25 RX ORDER — LISINOPRIL 20 MG/1
TABLET ORAL
Qty: 90 TABLET | Refills: 0 | Status: SHIPPED | OUTPATIENT
Start: 2021-08-25 | End: 2021-10-21

## 2021-08-25 RX ORDER — ATORVASTATIN CALCIUM 10 MG/1
TABLET, FILM COATED ORAL
Qty: 90 TABLET | Refills: 1 | Status: SHIPPED | OUTPATIENT
Start: 2021-08-25 | End: 2021-12-28

## 2021-08-25 NOTE — TELEPHONE ENCOUNTER
Failed protocol.  please route to  team if patient needs an appointment     Delia BAKERRN BSN  [facility]  [phone]

## 2021-08-25 NOTE — TELEPHONE ENCOUNTER
Script refill faxed. Remind pt to do follow up for med check and labs, since she is due. Her sodium had been low recently, so should do follow up labs , may need to adjust med's if remains low

## 2021-08-25 NOTE — TELEPHONE ENCOUNTER
Bonnie from [facility] calling again.    Waiting on okay for delay of home health orders; pt suppose to start cares tomorrow 8-26-21.     Routing to PCP.     Okay to leave detailed message for Bonnie on number provided in call log.

## 2021-08-26 NOTE — TELEPHONE ENCOUNTER
Called Bonnie and relayed Dr. Peter lynch to not have RN home cares and to delay start of care.     Pt will still have home PT and Home health Aide for home visits.     Liliana Ramirez RN

## 2021-08-26 NOTE — TELEPHONE ENCOUNTER
Ok. But just make sure she is safe at home and ortho should know too just to make sure she has appropriate cares at home

## 2021-08-27 ENCOUNTER — TELEPHONE (OUTPATIENT)
Dept: FAMILY MEDICINE | Facility: CLINIC | Age: 86
End: 2021-08-27

## 2021-08-27 NOTE — TELEPHONE ENCOUNTER
Verbal order given for home physical therapy once a week for 2 weeks and twice a week for 1 week for therapeutic exercises, gate and balance. Araseli Miranda RN

## 2021-08-30 ENCOUNTER — TELEPHONE (OUTPATIENT)
Dept: FAMILY MEDICINE | Facility: CLINIC | Age: 86
End: 2021-08-30

## 2021-08-30 NOTE — TELEPHONE ENCOUNTER
"Call from [facility]. Verbal orders given for:   PT: 2x for 4 weeks starting 8/30 to work on range of motion, strength, pain, gait, transfers.     Knee Redness  - Redness at incision site  - \"skin seems to be peeling around there.\"   - Surgeon aware, staple removal delayed and given levofloxacin abx.   - Appt tomorrow with surgeons office to assess  - if not improving, may get nursing involved with home care  - Pt expresses feeling depressed because of how knee looks    Surgeon - Dr. Awan with [facility]    Called patient who states she has been evaluated by Dr. Awan who delayed removing the staples. She has appointment tomorrow with him. She states overall, the redness seems to be improving but it is still red. She has almost completed abx course.     Advised to continue with follow-up tomorrow with Dr. Awan and to alert clinic staff of any changes. RN also advised her to schedule follow-up with PCP. She states she will call to schedule follow-up appointment.    "

## 2021-09-02 ENCOUNTER — TELEPHONE (OUTPATIENT)
Dept: FAMILY MEDICINE | Facility: CLINIC | Age: 86
End: 2021-09-02

## 2021-09-02 NOTE — TELEPHONE ENCOUNTER
Blanca PT from [facility] calling.     Verbal orders given for:     RN richard and treat on  9-3-21 for right total knee incision. PT Blanca states that incision looks a little bit red. No drainage, pain or swelling.        Liliana Ramirez RN

## 2021-09-03 ENCOUNTER — TELEPHONE (OUTPATIENT)
Dept: FAMILY MEDICINE | Facility: CLINIC | Age: 86
End: 2021-09-03

## 2021-09-03 NOTE — TELEPHONE ENCOUNTER
Luis RN with [facility] requesting orders for nurse to see pt 1W3W to monitor pt.    Verbal orders for homecare given for above and per protocol.    Jeovany can be reached at [phone]    Rocío CLEANING RN  EP Triage

## 2021-09-21 ENCOUNTER — TELEPHONE (OUTPATIENT)
Dept: FAMILY MEDICINE | Facility: CLINIC | Age: 86
End: 2021-09-21

## 2021-09-21 NOTE — TELEPHONE ENCOUNTER
Reason for Call:  Form, our goal is to have forms completed with 72 hours, however, some forms may require a visit or additional information.    Type of letter, form or note:  Home Health Certification    Who is the form from?: Home care    Where did the form come from: form was faxed in    What clinic location was the form placed at?: [facility]    Where the form was placed: Given to MA/RN    What number is listed as a contact on the form?: Phone: [phone] Fax [phone]       Additional comments: Cert period 8/26-10/24  Call taken on 9/21/2021 at 11:12 AM by Yana Ruiz

## 2021-09-23 ENCOUNTER — TELEPHONE (OUTPATIENT)
Dept: FAMILY MEDICINE | Facility: CLINIC | Age: 86
End: 2021-09-23

## 2021-09-23 NOTE — TELEPHONE ENCOUNTER
Left detailed message with verbal orders as outlined below.  Left detailed message on patient's home number that she is due for follow up appointment with Dr. Green. No CTC on file to contact a family member. Araseli Miranda RN

## 2021-09-23 NOTE — TELEPHONE ENCOUNTER
1) [facility] requesting verbal orders for continued orders for PT once a week for 4 weeks starting on 9/27/21 to address knee ROM, strength, balance and gait.    2) OK for verbal order for OT evaluation and treat for left shoulder starting week of 9/27?    [facility] has advised patient to schedule follow up appointment with PCP. TERENCE Green 5/20/21. Saw Dr. Whitten 8/9/21 for preop.     Can we leave a detailed message on this number? YES  Phone number patient can be reached at: Other phone number:  [phone] for [facility], RN  [facility] Triage

## 2021-09-24 NOTE — TELEPHONE ENCOUNTER
Health Certification 8/26/21-10/24/21    MED REC DONE     Discrepancies:        Meds on Epic but NOT  on Form:      Polyethylene-glycol 17 gr packet: 8/11/21 take 17 grams by mouth daily.    Senna-docusate 8.6-50 mg tablet: 8/13/21 take 1 tablet by mouth 2 times daily.  Take while on oral narcotics to prevent or treat constipation      Meds on Form but NOT on Epic :    Aspirin childrens 81 mg chewable tab: take 1 tablet by mouth 2 times daily    Levofloxacin 500 mg tablet: take 1 tablet at bedtime for TKA irritation    Pepto-bismol 262 chewable tablet:  1-2 tabs every 4 hours PRN for heartburn    Tramadol 50 mg tablet:  Take 1-2 tabs every 6 hours PRN for severe pain.  Take for pain 3-6/10 and take 2 tabs for pain 7/10 or over      Routing to PCP for further review/recommendations/orders    Rocío CLEANING RN  EP Triage

## 2021-09-27 NOTE — TELEPHONE ENCOUNTER
Pt is S/p TKA, 08/13. I have not seen her for follow up since. So please verify her med's- may be she is off tramadol and Senakot or miralax if no constipation issue then we may not have to add those to epic  her med list - so please verify and update list in epic   Verify ASA - as well if she is taking it  now ?

## 2021-09-28 ENCOUNTER — TELEPHONE (OUTPATIENT)
Dept: FAMILY MEDICINE | Facility: CLINIC | Age: 86
End: 2021-09-28

## 2021-09-28 NOTE — TELEPHONE ENCOUNTER
Pt is not taking aspirin, tramadol, pepto bismol, or levofloxacin.    Medication list printed.  Form updated and given to Dr. Green to sign.    Rocío CLEANING RN  EP Triage

## 2021-09-28 NOTE — TELEPHONE ENCOUNTER
[facility] nurse called    OT 2x/4 weeks - left shoulder impairment     Verbal given on HC orders     Azalia MOTT RN

## 2021-10-01 ENCOUNTER — TELEPHONE (OUTPATIENT)
Dept: FAMILY MEDICINE | Facility: CLINIC | Age: 86
End: 2021-10-01

## 2021-10-21 ENCOUNTER — OFFICE VISIT (OUTPATIENT)
Dept: FAMILY MEDICINE | Facility: CLINIC | Age: 86
End: 2021-10-21
Payer: COMMERCIAL

## 2021-10-21 VITALS
DIASTOLIC BLOOD PRESSURE: 68 MMHG | BODY MASS INDEX: 26.82 KG/M2 | WEIGHT: 161 LBS | SYSTOLIC BLOOD PRESSURE: 112 MMHG | HEART RATE: 52 BPM | HEIGHT: 65 IN | RESPIRATION RATE: 18 BRPM | OXYGEN SATURATION: 97 % | TEMPERATURE: 97.2 F

## 2021-10-21 DIAGNOSIS — I10 ESSENTIAL HYPERTENSION: Primary | ICD-10-CM

## 2021-10-21 DIAGNOSIS — N18.9 CHRONIC KIDNEY DISEASE, UNSPECIFIED CKD STAGE: ICD-10-CM

## 2021-10-21 DIAGNOSIS — Z96.651 TOTAL KNEE REPLACEMENT STATUS, RIGHT: ICD-10-CM

## 2021-10-21 DIAGNOSIS — M25.50 ARTHRALGIA, UNSPECIFIED JOINT: ICD-10-CM

## 2021-10-21 PROCEDURE — 36415 COLL VENOUS BLD VENIPUNCTURE: CPT | Performed by: FAMILY MEDICINE

## 2021-10-21 PROCEDURE — 80053 COMPREHEN METABOLIC PANEL: CPT | Performed by: FAMILY MEDICINE

## 2021-10-21 PROCEDURE — 99214 OFFICE O/P EST MOD 30 MIN: CPT | Performed by: FAMILY MEDICINE

## 2021-10-21 RX ORDER — TRAMADOL HYDROCHLORIDE 50 MG/1
TABLET ORAL
COMMUNITY
Start: 2021-08-25 | End: 2021-10-21

## 2021-10-21 RX ORDER — LISINOPRIL 20 MG/1
20 TABLET ORAL DAILY
Qty: 90 TABLET | Refills: 1 | Status: SHIPPED | OUTPATIENT
Start: 2021-10-21 | End: 2022-06-07 | Stop reason: DRUGHIGH

## 2021-10-21 RX ORDER — HYDROCHLOROTHIAZIDE 25 MG/1
25 TABLET ORAL DAILY
Qty: 90 TABLET | Refills: 0 | Status: CANCELLED | OUTPATIENT
Start: 2021-10-21

## 2021-10-21 RX ORDER — TRAMADOL HYDROCHLORIDE 50 MG/1
TABLET ORAL
Status: CANCELLED | OUTPATIENT
Start: 2021-10-21

## 2021-10-21 RX ORDER — TRAMADOL HYDROCHLORIDE 50 MG/1
50 TABLET ORAL DAILY
Refills: 0 | Status: ON HOLD | COMMUNITY
Start: 2021-10-21 | End: 2021-11-04

## 2021-10-21 ASSESSMENT — MIFFLIN-ST. JEOR: SCORE: 1171.17

## 2021-10-21 NOTE — PATIENT INSTRUCTIONS
.Take medications as directed.  Cares and symptomatic cares discussed   Follow up if problem or concern

## 2021-10-21 NOTE — PROGRESS NOTES
"  Assessment & Plan     (I10) Essential hypertension  (primary encounter diagnosis)  Comment   Plan: lisinopril (ZESTRIL) 20 MG tablet,         Comprehensive metabolic panel        BP in adequate control. Discussed cares, low fat low salt  diet etc. Check labs, call pt with results. Refill given.          encouraged home BP monitoring. Follow up recheck in 6 months, sooner if problem.       (N18.9) Chronic kidney disease, unspecified CKD stage  Comment: has been stable   Plan: Comprehensive metabolic panel            (Z96.651) Total knee replacement status, right  Comment: s/p knee replacement 08/2021  Plan:     (M25.50) Arthralgia, unspecified joint  Comment: having some problem with feet and ankle sometimes other joint issue as well  Plan: Orthopedic  Referral              Check labs. refill sent.Cares and  treatment discussed follow. up if problem   Patient and her daughter expressed understanding and agreement with treatment plan. All of their  questions were answered, will let me know if has more later.  Medications: Rx's: Reviewed the potential side effects/complications of medications prescribed.        BMI:   Estimated body mass index is 26.79 kg/m  as calculated from the following:    Height as of this encounter: 1.651 m (5' 5\").    Weight as of this encounter: 73 kg (161 lb).         Return in about 3 months (around 1/21/2022) for but return sooner if problem.    Dinah Green MD  [facility] NACHO Hedrick is a 86 year old who presents for the following health issues     HPI       Hypertension Follow-up      Do you check your blood pressure regularly outside of the clinic? Yes within range     Are you following a low salt diet? No    Are your blood pressures ever more than 140 on the top number (systolic) OR more   than 90 on the bottom number (diastolic), for example 140/90? No      How many servings of fruits and vegetables do you eat daily?  0-1    On " "average, how many sweetened beverages do you drink each day (Examples: soda, juice, sweet tea, etc.  Do NOT count diet or artificially sweetened beverages)?   0    How many days per week do you exercise enough to make your heart beat faster? 3 or less    How many minutes a day do you exercise enough to make your heart beat faster? 10 - 19    How many days per week do you miss taking your medication? 0    Medication Followup of tramadol     Taking Medication as prescribed: yes    Side Effects:  None    Medication Helping Symptoms:  yes     S/p knee surgery and it has improved over period of time has done  PT , and it has helped a lot  and it is going well and  she thinks she may wants to push more . She was given tramadol earlier after but wondering if she needs to continue, although she thinks her pain control is not bad and  Her rt knee is much better but sometimes her back hurts from sitting too long. And usually it not too bad and she feels better after moving around. But having some problem with feet and ankle sometimes  as well so limits her from walking comfortably. Tylenol also helps and she does not really take pain med's too often as pain is not too bad. No radiating pain or numbness in legs etc     Review of Systems   Constitutional, HEENT, cardiovascular, pulmonary, GI, , musculoskeletal, neuro, skin, endocrine and psych systems are negative, except as otherwise noted.      Objective    /68   Pulse 52   Temp 97.2  F (36.2  C) (Tympanic)   Resp 18   Ht 1.651 m (5' 5\")   Wt 73 kg (161 lb)   SpO2 97%   BMI 26.79 kg/m    Body mass index is 26.79 kg/m .  Physical Exam   GENERAL: healthy, alert and no distress  HENT: ear canals and TM's normal, nose and mouth without ulcers or lesions  NECK: no adenopathy, no asymmetry, masses, or scars and thyroid normal to palpation  RESP: lungs clear to auscultation - no rales, rhonchi or wheezes  CV: regular rate and rhythm, normal S1 S2, no S3 or S4, , no " peripheral edema   ABDOMEN: soft, nontender, no hepatosplenomegaly, no masses and bowel sounds normal  MS: back with slight stiffness and mildly diffuse discomfort mostly sacral / gluteal area but no swelling erythema. Rt Knee incision is healing well and ROM is not bad, ankle with filiberto stiffness and ROM aggravates pain   NEURO: Normal strength and tone, mentation intact and speech normal  PSYCH: mentation appears normal, affect normal

## 2021-10-22 LAB
ALBUMIN SERPL-MCNC: 3.1 G/DL (ref 3.4–5)
ALP SERPL-CCNC: 116 U/L (ref 40–150)
ALT SERPL W P-5'-P-CCNC: 20 U/L (ref 0–50)
ANION GAP SERPL CALCULATED.3IONS-SCNC: 8 MMOL/L (ref 3–14)
AST SERPL W P-5'-P-CCNC: 18 U/L (ref 0–45)
BILIRUB SERPL-MCNC: 0.6 MG/DL (ref 0.2–1.3)
BUN SERPL-MCNC: 20 MG/DL (ref 7–30)
CALCIUM SERPL-MCNC: 9.1 MG/DL (ref 8.5–10.1)
CHLORIDE BLD-SCNC: 106 MMOL/L (ref 94–109)
CO2 SERPL-SCNC: 26 MMOL/L (ref 20–32)
CREAT SERPL-MCNC: 0.71 MG/DL (ref 0.52–1.04)
GFR SERPL CREATININE-BSD FRML MDRD: 77 ML/MIN/1.73M2
GLUCOSE BLD-MCNC: 95 MG/DL (ref 70–99)
POTASSIUM BLD-SCNC: 4.2 MMOL/L (ref 3.4–5.3)
PROT SERPL-MCNC: 6.6 G/DL (ref 6.8–8.8)
SODIUM SERPL-SCNC: 140 MMOL/L (ref 133–144)

## 2021-10-26 ENCOUNTER — HOSPITAL ENCOUNTER (INPATIENT)
Facility: CLINIC | Age: 86
LOS: 8 days | Discharge: HOME HEALTH | DRG: 357 | End: 2021-11-04
Attending: EMERGENCY MEDICINE | Admitting: HOSPITALIST
Payer: COMMERCIAL

## 2021-10-26 ENCOUNTER — MEDICAL CORRESPONDENCE (OUTPATIENT)
Dept: HEALTH INFORMATION MANAGEMENT | Facility: CLINIC | Age: 86
End: 2021-10-26

## 2021-10-26 ENCOUNTER — TRANSFERRED RECORDS (OUTPATIENT)
Dept: HEALTH INFORMATION MANAGEMENT | Facility: CLINIC | Age: 86
End: 2021-10-26

## 2021-10-26 DIAGNOSIS — K92.1 HEMATOCHEZIA: ICD-10-CM

## 2021-10-26 DIAGNOSIS — K57.31 DIVERTICULAR HEMORRHAGE: Primary | ICD-10-CM

## 2021-10-26 LAB
ABO/RH(D): NORMAL
ALBUMIN SERPL-MCNC: 2.7 G/DL (ref 3.4–5)
ALP SERPL-CCNC: 119 U/L (ref 40–150)
ALT SERPL W P-5'-P-CCNC: 32 U/L (ref 0–50)
ANION GAP SERPL CALCULATED.3IONS-SCNC: 6 MMOL/L (ref 3–14)
ANTIBODY SCREEN: NEGATIVE
APTT PPP: 34 SECONDS (ref 22–38)
APTT PPP: 35 SECONDS (ref 22–38)
AST SERPL W P-5'-P-CCNC: 24 U/L (ref 0–45)
BASOPHILS # BLD AUTO: 0.1 10E3/UL (ref 0–0.2)
BASOPHILS NFR BLD AUTO: 1 %
BILIRUB SERPL-MCNC: 0.4 MG/DL (ref 0.2–1.3)
BUN SERPL-MCNC: 11 MG/DL (ref 7–30)
CALCIUM SERPL-MCNC: 8.6 MG/DL (ref 8.5–10.1)
CHLORIDE BLD-SCNC: 106 MMOL/L (ref 94–109)
CO2 SERPL-SCNC: 25 MMOL/L (ref 20–32)
CREAT SERPL-MCNC: 0.76 MG/DL (ref 0.52–1.04)
EOSINOPHIL # BLD AUTO: 0.2 10E3/UL (ref 0–0.7)
EOSINOPHIL NFR BLD AUTO: 2 %
ERYTHROCYTE [DISTWIDTH] IN BLOOD BY AUTOMATED COUNT: 15.9 % (ref 10–15)
GFR SERPL CREATININE-BSD FRML MDRD: 71 ML/MIN/1.73M2
GLUCOSE BLD-MCNC: 108 MG/DL (ref 70–99)
HCT VFR BLD AUTO: 35.8 % (ref 35–47)
HGB BLD-MCNC: 10.9 G/DL (ref 11.7–15.7)
HGB BLD-MCNC: 11.6 G/DL (ref 11.7–15.7)
HGB BLD-MCNC: 9.9 G/DL (ref 11.7–15.7)
HOLD SPECIMEN: NORMAL
IMM GRANULOCYTES # BLD: 0 10E3/UL
IMM GRANULOCYTES NFR BLD: 0 %
INR PPP: 1.09 (ref 0.85–1.15)
INR PPP: 1.14 (ref 0.85–1.15)
LACTATE SERPL-SCNC: 1.5 MMOL/L (ref 0.7–2)
LYMPHOCYTES # BLD AUTO: 1.7 10E3/UL (ref 0.8–5.3)
LYMPHOCYTES NFR BLD AUTO: 20 %
MCH RBC QN AUTO: 28.9 PG (ref 26.5–33)
MCHC RBC AUTO-ENTMCNC: 32.4 G/DL (ref 31.5–36.5)
MCV RBC AUTO: 89 FL (ref 78–100)
MONOCYTES # BLD AUTO: 0.4 10E3/UL (ref 0–1.3)
MONOCYTES NFR BLD AUTO: 5 %
NEUTROPHILS # BLD AUTO: 5.8 10E3/UL (ref 1.6–8.3)
NEUTROPHILS NFR BLD AUTO: 72 %
NRBC # BLD AUTO: 0 10E3/UL
NRBC BLD AUTO-RTO: 0 /100
PLATELET # BLD AUTO: 315 10E3/UL (ref 150–450)
POTASSIUM BLD-SCNC: 3.9 MMOL/L (ref 3.4–5.3)
PROT SERPL-MCNC: 6.4 G/DL (ref 6.8–8.8)
RBC # BLD AUTO: 4.02 10E6/UL (ref 3.8–5.2)
SARS-COV-2 RNA RESP QL NAA+PROBE: NEGATIVE
SODIUM SERPL-SCNC: 137 MMOL/L (ref 133–144)
SPECIMEN EXPIRATION DATE: NORMAL
WBC # BLD AUTO: 8.1 10E3/UL (ref 4–11)

## 2021-10-26 PROCEDURE — 250N000013 HC RX MED GY IP 250 OP 250 PS 637: Performed by: HOSPITALIST

## 2021-10-26 PROCEDURE — 36415 COLL VENOUS BLD VENIPUNCTURE: CPT | Performed by: EMERGENCY MEDICINE

## 2021-10-26 PROCEDURE — 85610 PROTHROMBIN TIME: CPT | Performed by: HOSPITALIST

## 2021-10-26 PROCEDURE — 85610 PROTHROMBIN TIME: CPT | Performed by: EMERGENCY MEDICINE

## 2021-10-26 PROCEDURE — 258N000003 HC RX IP 258 OP 636: Performed by: EMERGENCY MEDICINE

## 2021-10-26 PROCEDURE — G0378 HOSPITAL OBSERVATION PER HR: HCPCS

## 2021-10-26 PROCEDURE — 36415 COLL VENOUS BLD VENIPUNCTURE: CPT | Performed by: HOSPITALIST

## 2021-10-26 PROCEDURE — 85018 HEMOGLOBIN: CPT | Performed by: HOSPITALIST

## 2021-10-26 PROCEDURE — 86900 BLOOD TYPING SEROLOGIC ABO: CPT | Performed by: EMERGENCY MEDICINE

## 2021-10-26 PROCEDURE — 99220 PR INITIAL OBSERVATION CARE,LEVEL III: CPT | Performed by: HOSPITALIST

## 2021-10-26 PROCEDURE — 83605 ASSAY OF LACTIC ACID: CPT | Performed by: EMERGENCY MEDICINE

## 2021-10-26 PROCEDURE — 85730 THROMBOPLASTIN TIME PARTIAL: CPT | Performed by: EMERGENCY MEDICINE

## 2021-10-26 PROCEDURE — 96361 HYDRATE IV INFUSION ADD-ON: CPT

## 2021-10-26 PROCEDURE — 85025 COMPLETE CBC W/AUTO DIFF WBC: CPT | Performed by: EMERGENCY MEDICINE

## 2021-10-26 PROCEDURE — 82040 ASSAY OF SERUM ALBUMIN: CPT | Performed by: EMERGENCY MEDICINE

## 2021-10-26 PROCEDURE — 99285 EMERGENCY DEPT VISIT HI MDM: CPT | Mod: 25

## 2021-10-26 PROCEDURE — 87635 SARS-COV-2 COVID-19 AMP PRB: CPT | Performed by: EMERGENCY MEDICINE

## 2021-10-26 PROCEDURE — 85730 THROMBOPLASTIN TIME PARTIAL: CPT | Performed by: HOSPITALIST

## 2021-10-26 PROCEDURE — C9803 HOPD COVID-19 SPEC COLLECT: HCPCS

## 2021-10-26 PROCEDURE — 96360 HYDRATION IV INFUSION INIT: CPT

## 2021-10-26 RX ORDER — ACETAMINOPHEN 325 MG/1
650 TABLET ORAL EVERY 6 HOURS PRN
Status: DISCONTINUED | OUTPATIENT
Start: 2021-10-26 | End: 2021-11-04 | Stop reason: HOSPADM

## 2021-10-26 RX ORDER — TRIMETHOPRIM 100 MG/1
100 TABLET ORAL DAILY
Status: DISCONTINUED | OUTPATIENT
Start: 2021-10-26 | End: 2021-11-04 | Stop reason: HOSPADM

## 2021-10-26 RX ORDER — LISINOPRIL 20 MG/1
20 TABLET ORAL DAILY
Status: DISCONTINUED | OUTPATIENT
Start: 2021-10-27 | End: 2021-11-04 | Stop reason: HOSPADM

## 2021-10-26 RX ORDER — PROCHLORPERAZINE MALEATE 5 MG
5 TABLET ORAL EVERY 6 HOURS PRN
Status: DISCONTINUED | OUTPATIENT
Start: 2021-10-26 | End: 2021-11-04 | Stop reason: HOSPADM

## 2021-10-26 RX ORDER — ONDANSETRON 2 MG/ML
4 INJECTION INTRAMUSCULAR; INTRAVENOUS EVERY 6 HOURS PRN
Status: DISCONTINUED | OUTPATIENT
Start: 2021-10-26 | End: 2021-11-04 | Stop reason: HOSPADM

## 2021-10-26 RX ORDER — CALCIUM CARBONATE 500 MG/1
1 TABLET, CHEWABLE ORAL 2 TIMES DAILY PRN
COMMUNITY

## 2021-10-26 RX ORDER — ONDANSETRON 4 MG/1
4 TABLET, ORALLY DISINTEGRATING ORAL EVERY 6 HOURS PRN
Status: DISCONTINUED | OUTPATIENT
Start: 2021-10-26 | End: 2021-11-04 | Stop reason: HOSPADM

## 2021-10-26 RX ORDER — PROCHLORPERAZINE 25 MG
12.5 SUPPOSITORY, RECTAL RECTAL EVERY 12 HOURS PRN
Status: DISCONTINUED | OUTPATIENT
Start: 2021-10-26 | End: 2021-11-04 | Stop reason: HOSPADM

## 2021-10-26 RX ORDER — ACETAMINOPHEN 650 MG/1
650 SUPPOSITORY RECTAL EVERY 6 HOURS PRN
Status: DISCONTINUED | OUTPATIENT
Start: 2021-10-26 | End: 2021-11-04 | Stop reason: HOSPADM

## 2021-10-26 RX ORDER — CALCIUM CARBONATE 500 MG/1
500 TABLET, CHEWABLE ORAL 2 TIMES DAILY PRN
Status: DISCONTINUED | OUTPATIENT
Start: 2021-10-26 | End: 2021-11-04 | Stop reason: HOSPADM

## 2021-10-26 RX ORDER — LIDOCAINE 40 MG/G
CREAM TOPICAL
Status: DISCONTINUED | OUTPATIENT
Start: 2021-10-26 | End: 2021-11-04 | Stop reason: HOSPADM

## 2021-10-26 RX ADMIN — ACETAMINOPHEN 650 MG: 325 TABLET, FILM COATED ORAL at 23:31

## 2021-10-26 RX ADMIN — SODIUM CHLORIDE 500 ML: 9 INJECTION, SOLUTION INTRAVENOUS at 11:27

## 2021-10-26 ASSESSMENT — ENCOUNTER SYMPTOMS
BLOOD IN STOOL: 1
LIGHT-HEADEDNESS: 0
CONSTIPATION: 0
VOMITING: 0
ACTIVITY CHANGE: 0
ABDOMINAL PAIN: 1
NAUSEA: 0
APPETITE CHANGE: 0
DIARRHEA: 0

## 2021-10-26 ASSESSMENT — ACTIVITIES OF DAILY LIVING (ADL)
TOILETING_ISSUES: NO
WALKING_OR_CLIMBING_STAIRS_DIFFICULTY: NO
DIFFICULTY_EATING/SWALLOWING: NO
DOING_ERRANDS_INDEPENDENTLY_DIFFICULTY: NO
DRESSING/BATHING_DIFFICULTY: NO

## 2021-10-26 ASSESSMENT — MIFFLIN-ST. JEOR: SCORE: 1174.88

## 2021-10-26 NOTE — ED NOTES
[facility]  ED Nurse Handoff Report    ED Chief complaint: GI Bleeding      ED Diagnosis:   Final diagnoses:   None       Code Status: Full Code    Allergies:   Allergies   Allergen Reactions     Sulfa Drugs        Patient Story: Black stools for past week  Focused Assessment:  See ED assessment    Treatments and/or interventions provided: See orders  Patient's response to treatments and/or interventions: See results    To be done/followed up on inpatient unit:  NA    Does this patient have any cognitive concerns?: na    Activity level - Baseline/Home:  Walker  Activity Level - Current:   Walker    Patient's Preferred language: English   Needed?: No    Isolation: None  Infection: Not Applicable  Patient tested for COVID 19 prior to admission: YES  Bariatric?: No    Vital Signs:   Vitals:    10/26/21 1047   BP: 116/78   Pulse: 50   Resp: 14   Temp: 98.4  F (36.9  C)   TempSrc: Temporal   SpO2: 95%       Cardiac Rhythm:     Was the PSS-3 completed:   Yes  What interventions are required if any?               Family Comments: None present  OBS brochure/video discussed/provided to patient/family: No              Name of person given brochure if not patient: na              Relationship to patient: na    For the majority of the shift this patient's behavior was Green.   Behavioral interventions performed were na.    ED NURSE PHONE NUMBER: *[phone]

## 2021-10-26 NOTE — ED NOTES
Assisted the patient to the bathroom, she reports that she did end up going. She reports that she is not hungry right now but might be hungry later if she is able to eat.

## 2021-10-26 NOTE — CONSULTS
Acute abdominal pain with rectal bleeding  Hb 11. 6 hemodynamically stable  Will see patient today    Paul cabrera Gi

## 2021-10-26 NOTE — ED NOTES
Bed: ED08  Expected date:   Expected time:   Means of arrival:   Comments:  [facility] - 86 F GI bleed eta 3954

## 2021-10-26 NOTE — ED TRIAGE NOTES
Bloody stools over the last few weeks with rectal pain, incontinence of stool today, mentioned this to her primary last week with no work up, takes aspirin, no other blood thinners

## 2021-10-26 NOTE — ED PROVIDER NOTES
"  History   Chief Complaint:  GI Bleeding     The history is provided by the patient and medical records.      Floridalma Cunningham is an 86 year old female with history of HTN who presents with abdominal pain and rectal bleeding. Floridalma awoke this morning with low abdominal pain. She got out of bed and started walking towards the bathroom when she suddenly lost control of her bowels. She said it \"ran out of her.\" The material was primarily bright red blood with small lumps of stool or possibly clots. After the episode she felt well with resolution of abdominal pain. She called her clinic who advised her to come in to the ED. She has had a colonoscopy in the past, which revealed polyps. She has never had similar symptoms in the past and is not on anticoagulants. She denies lightheadedness. She does take Tums frequently.    She occasionally uses leftover pain medications from her knee surgery for neuropathy and unspecified muscular pain in her buttocks. She uses Aleve about twice per week.    Review of Systems   Constitutional: Negative for activity change and appetite change.   Gastrointestinal: Positive for abdominal pain (resolved) and blood in stool. Negative for constipation, diarrhea, nausea and vomiting.        + incontinence, stool   Neurological: Negative for light-headedness.   All other systems reviewed and are negative.    Allergies:  Sulfa Drugs  Na Benzoate-Sulfamethoxazole-Trimethoprim    Medications:  Lipitor  Hydrodiuril  Zestril  Miralax  Trimpex  Ultram    Past Medical History:     Arthritis  Hyperlipidemia  Hypertension  PVC's  RBBB  Chronic kidney disease  Osteoarthritis, bilateral knees  Actinic keratosis  Hypercholesterolemia  Colonic polyps    Past Surgical History:    Arthroplasty knee, right  Bunionectomy    Family History:    Brother: bladder cancer  Father: heard disease, MI  Sister: heart disease, hypertension, pacemaker    Social History:  The patient presents to the ED via EMS.  The patient is " Order placed, per MD.  Detailed message left for patient, asking her to call back for nurse visit for Tdap.     "COVID vaccinated.   Lives alone.  Does not smoke  Drinks twice per year    Physical Exam     Patient Vitals for the past 24 hrs:   BP Temp Temp src Pulse Resp SpO2 Height Weight   10/26/21 1441 124/75 -- Oral 91 16 98 % 1.651 m (5' 5\") 73.4 kg (161 lb 13.1 oz)   10/26/21 1200 (!) 144/98 -- -- 88 -- 99 % -- --   10/26/21 1047 116/78 98.4  F (36.9  C) Temporal 50 14 95 % -- --       Physical Exam  General: Well-developed and well-nourished. Well appearing elderly  woman. Cooperative.  Head:  Atraumatic.  Eyes:  Conjunctivae, lids, and sclerae are normal.  Neck:  Supple. Normal range of motion.  CV:  Regular rate and rhythm. Normal heart sounds with no murmurs, rubs, or gallops detected.  Resp:  No respiratory distress. Clear to auscultation bilaterally without decreased breath sounds, wheezing, rales, or rhonchi.  GI:  Soft. Non-distended. Non-tender.  Rectal: MARCIAL with return of maroon-red blood.    MS:  Normal ROM.   Skin:  Warm. Non-diaphoretic. No pallor.  Neuro:  Awake. A&Ox3. Normal strength.  Psych: Normal mood and affect. Normal speech.  Vitals reviewed.    Emergency Department Course     Laboratory:  Abnormal values below only. See results review for all others.   Labs Ordered and Resulted from Time of ED Arrival Up to the Time of Departure from the ED   COMPREHENSIVE METABOLIC PANEL - Abnormal; Notable for the following components:       Result Value    Glucose 108 (*)     Protein Total 6.4 (*)     Albumin 2.7 (*)     All other components within normal limits   CBC WITH PLATELETS AND DIFFERENTIAL - Abnormal; Notable for the following components:    Hemoglobin 11.6 (*)     RDW 15.9 (*)     All other components within normal limits   INR - Normal   PARTIAL THROMBOPLASTIN TIME - Normal   LACTIC ACID WHOLE BLOOD - Normal   COVID-19 VIRUS (CORONAVIRUS) BY PCR - Normal   ABO/RH TYPE AND SCREEN      Emergency Department Course:  Reviewed:  I reviewed nursing notes, vitals, past medical history, and Care " Everywhere.    Assessments:  1105 I obtained history and examined the patient as noted above.   1320 I rechecked the patient and explained findings.     Consults:  1312 I spoke with Dr. Castillo, hospitalist, who accepted the patient.     Interventions:  1127 0.9% sodium chloride BOLUS 500mL IV    Disposition:  The patient was admitted to the hospital under the care of Dr. Castillo.     Impression & Plan     Medical Decision Making:  Floridalma is an 86-year-old woman who awoke with abdominal pain.  She was getting up to use the restroom and was incontinent of stool.  She believes there were some formed pieces of stool or possibly these were blood clots as the majority of this bowel movement was bright red blood.  Her abdominal pain has actually resolved since then and she denies lightheadedness or other concerns.  She appears well on exam without any abdominal tenderness to warrant abdominal imaging.  MARCIAL does reveal maroon-colored blood.  Fortunately, her work-up is very reassuring.  There is no lactic acidosis, coagulopathy, kidney injury, electrolyte derangements, hepatitis, biliary obstruction, or leukocytosis.  Normocytic anemia to 11.6 is her baseline.  She was given IV fluids but required no further interventions.  However, this elderly woman lives alone and the cause for her hematochezia is unclear at this time.  I suspect it is of more benign etiology such as a diverticular bleed or internal hemorrhoid but feel she warrants observation to ensure she does not have persistent/recurrent bleeding or worsening anemia.  I discussed this plan with Floridalma and answered all her questions.  She verbalized understanding and is amenable.  I discussed the patient's case with Dr. Castillo, hospitalist, who accepts admission and has no further orders.    Diagnosis:    ICD-10-CM    1. Hematochezia  K92.1      Scribe Disclosure:  Otilia FULTON, am serving as a scribe at 11:03 AM on 10/26/2021 to document services personally performed  by Kaila Miller MD based on my observations and the provider's statements to me.     Kaila Miller MD  10/26/21 2131

## 2021-10-26 NOTE — H&P
[facility]    History and Physical - Hospitalist Service       Date of Admission:  10/26/2021    Assessment & Plan      Floridalma Cunningham is a 86 year old female with a history of hypertension, hyperlipidemia, and recurrent UTIs who presented to the ER following an episode of abdominal followed by a large bloody bowel movement. After evaluation in the ER, the hospitalist service was contacted to bring her into the hospital for further evaluation.    Abdominal pain  Rectal bleeding  Has had some bloating in her abdomen recently, states it resolves with Tums.  On the morning of admission she developed lower abdominal pain and got up to use the bathroom.  She had a large bloody bowel movement on her way into the bathroom.  Felt better afterwards and has not had any further bloody bowel movements.  Takes Aleve 2 or 3 days/week.  Denies any other NSAID or blood thinner use.  Recently started taking turmeric.  Hemoglobin stable at 11.6 in the ER.  Vitals stable.    [place] to observation.    Monitor hemoglobin every 6 hours.    Monitor for any further abdominal pain or bleeding.    Source of bleeding unclear, differential includes diverticular bleed, ischemic colitis, ulcer, other.    Consult GI to consider further evaluation, appreciate their assistance.    Avoid NSAIDs.  Discontinue turmeric as this can thin the blood and increase risk of bleeding.    Hypertension  Blood pressure okay in the ER.    Continue PTA lisinopril with hold parameters.    Hold PTA hydrochlorothiazide for now.    Hyperlipidemia    Hold PTA atorvastatin while in observation status.    Recurrent UTIs  Currently asymptomatic.    Continue PTA prophylactic trimethoprim.    COVID-19 PCR negative    Routine admission COVID-19 PCR testing was negative on 10/26/2021.    She received the Moderna COVID-19 vaccine on 1/17/2021 and 2/14/2021.     Diet: Clear Liquid Diet    DVT Prophylaxis: Low Risk/Ambulatory with no VTE prophylaxis  indicated, reassess if she requires more than one midnight in the hospital  Jackson Catheter: Not present  Central Lines: None  Code Status: No CPR- Do NOT Intubate      Clinically Significant Risk Factors Present on Admission                   Disposition Plan   Expected discharge: register to observation     The patient's care was discussed with the Bedside Nurse, Patient, Patient's Family, GI Consultant and ER Team.    Sandro Castillo MD  [facility]  Securely message with the Vocera Web Console (learn more here)  Text page via [employer] Paging/Directory        ______________________________________________________________________    Chief Complaint   Bright red blood per rectum earlier today    History is obtained from the patient    History of Present Illness   Floridalma Cunningham is a 86 year old female with a history of hypertension, hyperlipidemia, and recurrent UTIs who presented to the ER following an episode of abdominal followed by a large bloody bowel movement.  She has recently been noting some intermittent bloating in her abdomen.  She states that it goes away after she takes Tums.  Earlier today she developed lower abdominal pain and felt like she needed to go to the bathroom to have a bowel movement.  She got up out of bed and walk towards the bathroom and subsequently had a large bloody bowel movement.  The blood was bright red and it was mixed in with brown stool, but ultimately she states there was a lot of blood.  She felt better after the episode.  She called the clinic and was referred to the ER for further evaluation and management.  She has not had any further abdominal pain or any further bowel movements since this initial episode earlier today.    In the ER, she was evaluated by Dr. Miller.  Vitals were okay.  Labs were fairly unremarkable including a hemoglobin of 11.6.  It was felt that she should come into the hospital for observation and therefore the hospitalist  service was contacted.    She denies any prior history of GI bleeding.  She takes Aleve 2 or 3 times a week.  Had been taking it a little more frequently a month or 2 ago following a right total knee arthroplasty procedure.  Denies any other blood thinner use.  Rarely uses alcohol.    She denies fevers, chills, sweats.  No chest pain or shortness of breath.  No nausea.  No urinary symptoms.  No lightheadedness, headache, numbness, tingling, or focal weakness.  No skin changes.  Denies any recent sick contacts or known exposures to COVID-19.  Denies any significant changes to her medications recently although she did start taking a turmeric supplement a few days ago.    Review of Systems    The 10 point Review of Systems is negative other than noted in the HPI or here.    Past Medical History    I have reviewed this patient's medical history and updated it with pertinent information if needed.   Past Medical History:   Diagnosis Date     Arthritis      Hyperlipidemia      Hypertension      PVC's (premature ventricular contractions)      RBBB (right bundle branch block)      Past Surgical History   I have reviewed this patient's surgical history and updated it with pertinent information if needed.  Past Surgical History:   Procedure Laterality Date     ARTHROPLASTY KNEE Right 2021    Procedure: RIGHT TOTAL KNEE ARTHROPLASTY;  Surgeon: Eamon Awan MD;  Location:  OR     Social History   I have reviewed this patient's social history and updated it with pertinent information if needed.  Social History     Tobacco Use     Smoking status: Former Smoker     Quit date: 1945     Years since quittin.4     Smokeless tobacco: Never Used     Tobacco comment: smoked for two years   Vaping Use     Vaping Use: Never used   Substance Use Topics     Alcohol use: Yes     Comment: rarely     Drug use: No     Family History   I have reviewed this patient's family history and updated it with pertinent information if  needed.  Family History   Problem Relation Age of Onset     Coronary Artery Disease Father          of MI AT AGE 59      Bladder Cancer Brother      Irregular heart beat Mother      Prior to Admission Medications   Prior to Admission Medications   Prescriptions Last Dose Informant Patient Reported? Taking?   CRANBERRY PO 10/25/2021 at Unknown time Self Yes Yes   Sig: Take 1 tablet by mouth daily   Omega-3 Fatty Acids (FISH OIL PO) 10/25/2021 at Unknown time Self Yes Yes   Sig: Take 1,000 mg by mouth daily    Turmeric (QC TUMERIC COMPLEX PO) 10/25/2021 at Unknown time  Yes Yes   Sig: Take 2 tablets by mouth daily   acetaminophen (TYLENOL) 325 MG tablet Past Month at Unknown time  Yes Yes   Sig: Take 650 mg by mouth every 8 hours as needed    atorvastatin (LIPITOR) 10 MG tablet 10/25/2021 at Unknown time  No Yes   Sig: TAKE 1 TABLET BY MOUTH EVERY DAY   calcium carbonate (TUMS) 500 MG chewable tablet Past Week at Unknown time  Yes Yes   Sig: Take 1 chew tab by mouth 2 times daily as needed for heartburn   hydrochlorothiazide (HYDRODIURIL) 25 MG tablet 10/25/2021 at Unknown time  No Yes   Sig: TAKE 1 TABLET BY MOUTH EVERY DAY   lisinopril (ZESTRIL) 20 MG tablet 10/25/2021 at Unknown time  No Yes   Sig: Take 1 tablet (20 mg) by mouth daily   multivitamin w/minerals (MULTI-VITAMIN) tablet 10/25/2021 at Unknown time Self Yes Yes   Sig: Take 1 tablet by mouth daily   polyethylene glycol (MIRALAX) 17 g packet More than a month at Unknown time  No Yes   Sig: Take 17 g by mouth daily   Patient taking differently: Take 1 packet by mouth daily as needed    traMADol (ULTRAM) 50 MG tablet Past Week at Unknown time  Yes Yes   Sig: Take 1 tablet (50 mg) by mouth daily   trimethoprim (TRIMPEX) 100 MG tablet 10/25/2021 at Unknown time  No Yes   Sig: TAKE 1 TABLET BY MOUTH EVERY DAY      Facility-Administered Medications: None     Allergies   Allergies   Allergen Reactions     Sulfa Drugs      Physical Exam   Vital Signs: Temp:  98.4  F (36.9  C) Temp src: Oral BP: 124/75 Pulse: 91   Resp: 16 SpO2: 98 % O2 Device: None (Room air)    Weight: 161 lbs 13.08 oz    Constitutional: awake, alert, cooperative, no apparent distress, sitting up in a chair  Eyes: sclera clear, conjunctiva normal  ENT: oral pharynx with moist mucous membranes  Respiratory: clear to auscultation bilaterally, no crackles or wheezing  Cardiovascular: regular rate and rhythm, normal S1 and S2, no murmur noted  GI: normal bowel sounds, soft, non-distended, non-tender  Skin: warm, dry  Musculoskeletal: no lower extremity pitting edema present  Neurologic: awake, alert, oriented to name, place and time, motor is 5 out of 5 bilaterally, sensory is intact    Data   Data reviewed today: I reviewed all medications, new labs and imaging results over the last 24 hours. I personally reviewed no images or EKG's today.    Recent Labs   Lab 10/26/21  1059 10/26/21  1057 10/21/21  1209   WBC  --  8.1  --    HGB  --  11.6*  --    MCV  --  89  --    PLT  --  315  --    INR 1.14  --   --    NA  --  137 140   POTASSIUM  --  3.9 4.2   CHLORIDE  --  106 106   CO2  --  25 26   BUN  --  11 20   CR  --  0.76 0.71   ANIONGAP  --  6 8   VLADIMIR  --  8.6 9.1   GLC  --  108* 95   ALBUMIN  --  2.7* 3.1*   PROTTOTAL  --  6.4* 6.6*   BILITOTAL  --  0.4 0.6   ALKPHOS  --  119 116   ALT  --  32 20   AST  --  24 18

## 2021-10-26 NOTE — PROGRESS NOTES
RECEIVING UNIT ED HANDOFF REVIEW    ED Nurse Handoff Report was reviewed by: Kierra Andino RN on October 26, 2021 at 2:15 PM

## 2021-10-26 NOTE — PROGRESS NOTES
Observation goals PRIOR TO DISCHARGE       -diagnostic tests and consults completed and resulted: not met   -vital signs normal or at patient baseline: met     Nurse to notify provider when observation goals have been met and patient is ready for discharge.

## 2021-10-26 NOTE — PLAN OF CARE
A&Ox4 and VSS on RA. Up SBA with walker and tolerating clear liquids. Baseline neuropathy in BLE unchanged.1 bloody stool this shift and Hgb 10.9. IV SL and denies pain. GI consult. Continue to monitor.

## 2021-10-26 NOTE — PHARMACY-ADMISSION MEDICATION HISTORY
Pharmacy Medication History  Admission medication history interview status for the 10/26/2021  admission is complete. See EPIC admission navigator for prior to admission medications     Location of Interview: Patient room  Medication history sources: Patient    Significant changes made to the medication list:  Added: Tums, Tumeric    In the past week, patient estimated taking medication this percent of the time: greater than 90%    Additional medication history information:   N/A    Medication reconciliation completed by provider prior to medication history? Yes    Time spent in this activity: 15 minutes     Prior to Admission medications    Medication Sig Last Dose Taking? Auth Provider   acetaminophen (TYLENOL) 325 MG tablet Take 650 mg by mouth every 8 hours as needed  Past Month at Unknown time Yes Reported, Patient   atorvastatin (LIPITOR) 10 MG tablet TAKE 1 TABLET BY MOUTH EVERY DAY 10/25/2021 at Unknown time Yes Dinah Green MD   calcium carbonate (TUMS) 500 MG chewable tablet Take 1 chew tab by mouth 2 times daily as needed for heartburn Past Week at Unknown time Yes Unknown, Entered By History   CRANBERRY PO Take 1 tablet by mouth daily 10/25/2021 at Unknown time Yes Reported, Patient   hydrochlorothiazide (HYDRODIURIL) 25 MG tablet TAKE 1 TABLET BY MOUTH EVERY DAY 10/25/2021 at Unknown time Yes Dinah Green MD   lisinopril (ZESTRIL) 20 MG tablet Take 1 tablet (20 mg) by mouth daily 10/25/2021 at Unknown time Yes Dinah Green MD   multivitamin w/minerals (MULTI-VITAMIN) tablet Take 1 tablet by mouth daily 10/25/2021 at Unknown time Yes Reported, Patient   Omega-3 Fatty Acids (FISH OIL PO) Take 1,000 mg by mouth daily  10/25/2021 at Unknown time Yes Reported, Patient   polyethylene glycol (MIRALAX) 17 g packet Take 17 g by mouth daily  Patient taking differently: Take 1 packet by mouth daily as needed  More than a month at Unknown time Yes Eamon Awan MD   traMADol (ULTRAM)  50 MG tablet Take 1 tablet (50 mg) by mouth daily Past Week at Unknown time Yes Dinah Green MD   trimethoprim (TRIMPEX) 100 MG tablet TAKE 1 TABLET BY MOUTH EVERY DAY 10/25/2021 at Unknown time Yes Dinah Green MD   Turmeric (QC TUMERIC COMPLEX PO) Take 2 tablets by mouth daily 10/25/2021 at Unknown time Yes Unknown, Entered By History       The information provided in this note is only as accurate as the sources available at the time of update(s)

## 2021-10-27 LAB
ANION GAP SERPL CALCULATED.3IONS-SCNC: 8 MMOL/L (ref 3–14)
BUN SERPL-MCNC: 13 MG/DL (ref 7–30)
CALCIUM SERPL-MCNC: 8.3 MG/DL (ref 8.5–10.1)
CHLORIDE BLD-SCNC: 107 MMOL/L (ref 94–109)
CO2 SERPL-SCNC: 21 MMOL/L (ref 20–32)
CREAT SERPL-MCNC: 0.7 MG/DL (ref 0.52–1.04)
ERYTHROCYTE [DISTWIDTH] IN BLOOD BY AUTOMATED COUNT: 15.8 % (ref 10–15)
GFR SERPL CREATININE-BSD FRML MDRD: 79 ML/MIN/1.73M2
GLUCOSE BLD-MCNC: 116 MG/DL (ref 70–99)
HCT VFR BLD AUTO: 31.8 % (ref 35–47)
HGB BLD-MCNC: 10.5 G/DL (ref 11.7–15.7)
HGB BLD-MCNC: 10.5 G/DL (ref 11.7–15.7)
HGB BLD-MCNC: 8.9 G/DL (ref 11.7–15.7)
MCH RBC QN AUTO: 28.9 PG (ref 26.5–33)
MCHC RBC AUTO-ENTMCNC: 33 G/DL (ref 31.5–36.5)
MCV RBC AUTO: 88 FL (ref 78–100)
PLATELET # BLD AUTO: 367 10E3/UL (ref 150–450)
POTASSIUM BLD-SCNC: 4.1 MMOL/L (ref 3.4–5.3)
RBC # BLD AUTO: 3.63 10E6/UL (ref 3.8–5.2)
SODIUM SERPL-SCNC: 136 MMOL/L (ref 133–144)
WBC # BLD AUTO: 13.5 10E3/UL (ref 4–11)

## 2021-10-27 PROCEDURE — 85014 HEMATOCRIT: CPT | Performed by: HOSPITALIST

## 2021-10-27 PROCEDURE — G0378 HOSPITAL OBSERVATION PER HR: HCPCS

## 2021-10-27 PROCEDURE — 36415 COLL VENOUS BLD VENIPUNCTURE: CPT | Performed by: PHYSICIAN ASSISTANT

## 2021-10-27 PROCEDURE — 36415 COLL VENOUS BLD VENIPUNCTURE: CPT | Performed by: HOSPITALIST

## 2021-10-27 PROCEDURE — 250N000013 HC RX MED GY IP 250 OP 250 PS 637: Performed by: PHYSICIAN ASSISTANT

## 2021-10-27 PROCEDURE — 250N000013 HC RX MED GY IP 250 OP 250 PS 637: Performed by: HOSPITALIST

## 2021-10-27 PROCEDURE — 80048 BASIC METABOLIC PNL TOTAL CA: CPT | Performed by: HOSPITALIST

## 2021-10-27 PROCEDURE — 120N000004 HC R&B MS OVERFLOW

## 2021-10-27 PROCEDURE — 99232 SBSQ HOSP IP/OBS MODERATE 35: CPT | Performed by: PHYSICIAN ASSISTANT

## 2021-10-27 PROCEDURE — 85018 HEMOGLOBIN: CPT | Performed by: PHYSICIAN ASSISTANT

## 2021-10-27 RX ORDER — POLYETHYLENE GLYCOL 3350 17 G/17G
238 POWDER, FOR SOLUTION ORAL ONCE
Status: COMPLETED | OUTPATIENT
Start: 2021-10-27 | End: 2021-10-27

## 2021-10-27 RX ORDER — BISACODYL 5 MG
5 TABLET, DELAYED RELEASE (ENTERIC COATED) ORAL ONCE
Status: COMPLETED | OUTPATIENT
Start: 2021-10-27 | End: 2021-10-27

## 2021-10-27 RX ORDER — MAGNESIUM CARB/ALUMINUM HYDROX 105-160MG
296 TABLET,CHEWABLE ORAL ONCE
Status: COMPLETED | OUTPATIENT
Start: 2021-10-28 | End: 2021-10-28

## 2021-10-27 RX ORDER — BISACODYL 5 MG
10 TABLET, DELAYED RELEASE (ENTERIC COATED) ORAL ONCE
Status: COMPLETED | OUTPATIENT
Start: 2021-10-27 | End: 2021-10-27

## 2021-10-27 RX ADMIN — TRIMETHOPRIM 100 MG: 100 TABLET ORAL at 07:57

## 2021-10-27 RX ADMIN — ACETAMINOPHEN 650 MG: 325 TABLET, FILM COATED ORAL at 14:21

## 2021-10-27 RX ADMIN — BISACODYL 5 MG: 5 TABLET, COATED ORAL at 14:21

## 2021-10-27 RX ADMIN — ACETAMINOPHEN 650 MG: 325 TABLET, FILM COATED ORAL at 07:43

## 2021-10-27 RX ADMIN — POLYETHYLENE GLYCOL 3350 238 G: 17 POWDER, FOR SOLUTION ORAL at 21:45

## 2021-10-27 RX ADMIN — BISACODYL 5 MG: 5 TABLET, COATED ORAL at 12:34

## 2021-10-27 RX ADMIN — LISINOPRIL 20 MG: 20 TABLET ORAL at 07:43

## 2021-10-27 ASSESSMENT — ACTIVITIES OF DAILY LIVING (ADL)
ADLS_ACUITY_SCORE: 9

## 2021-10-27 NOTE — PROGRESS NOTES
[facility]    Hospitalist Progress Note    Date of Service (when I saw the patient): 10/27/2021    Assessment & Plan   Floridalma Cunningham is a 86 year old female with a history of hypertension, hyperlipidemia, and recurrent UTIs who presented to the ER following an episode of abdominal followed by a large bloody bowel movement. After evaluation in the ER, the hospitalist service was contacted to bring her into the hospital for further evaluation.     Abdominal pain  Rectal bleeding, hematochezia  Has had some bloating in her abdomen recently, states it resolves with Tums.  On the morning of admission she developed lower abdominal pain and got up to use the bathroom.  She had a large bloody bowel movement on her way into the bathroom.  Felt better afterwards and then did not have any further bloody bowel movements.  Takes Aleve 2 or 3 days/week.  Denies any other NSAID or blood thinner use.  Recently started taking turmeric.  Hemoglobin stable at 11.6 in the ER.  Vitals stable.  While being monitored in the hospital, patient once again started having bloody stools.    Patient continues to have multiple alec bloody stools as of 10/27, and is admitted to inpatient status now    Monitor hemoglobin every 6 hours.    Monitor abdominal pain and bleeding    Source of bleeding unclear, differential includes diverticular bleed, ischemic colitis, ulcer, other.    GI has been consulted, appreciate their assistance.    Colonoscopy scheduled tomorrow 10/28    Clear liquid diet until midnight, then n.p.o.    Avoid NSAIDs.  Discontinue turmeric as this can thin the blood and increase risk of bleeding.     Hypertension  Blood pressure okay in the ER.    Continue PTA lisinopril with hold parameters.    Hold PTA hydrochlorothiazide for now.     Hyperlipidemia    Hold PTA atorvastatin      Recurrent UTIs  Currently asymptomatic.    Continue PTA prophylactic trimethoprim.     COVID-19 PCR negative    Routine  admission COVID-19 PCR testing was negative on 10/26/2021.    She received the Moderna COVID-19 vaccine on 1/17/2021 and 2/14/2021.        Diet: Clear Liquid Diet    DVT Prophylaxis: Low Risk/Ambulatory with no VTE prophylaxis indicated, reassess if she requires more than one midnight in the hospital  Jackson Catheter: Not present  Central Lines: None  Code Status: No CPR- Do NOT Intubate      Disposition: Expected discharge in 1-2 days pending GI work-up, colonoscopy results, stable hemoglobin, and resolution of bleeding.    Bradford Leone    Interval History   Patient lying in bed on my arrival.  Denies abdominal pain presently.  Had a little abdominal discomfort earlier, and reports at least 4 bloody bowel movements.  States blood is bright red.  Denies nausea or vomiting.  Denies fever or chills.  No chest pain or shortness of breath.  Has seen GI and patient aware of plan for colonoscopy tomorrow.    -Data reviewed today: I reviewed all new labs and imaging results over the last 24 hours.     Physical Exam   Temp: 98.1  F (36.7  C) Temp src: Oral BP: 115/78 Pulse: 99   Resp: 18 SpO2: 96 % O2 Device: None (Room air)    Vitals:    10/26/21 1441   Weight: 73.4 kg (161 lb 13.1 oz)     Vital Signs with Ranges  Temp:  [97.7  F (36.5  C)-98.5  F (36.9  C)] 98.1  F (36.7  C)  Pulse:  [50-99] 99  Resp:  [14-20] 18  BP: (109-150)/(75-98) 115/78  SpO2:  [94 %-99 %] 96 %  I/O last 3 completed shifts:  In: 240 [P.O.:240]  Out: -     Constitutional: Well-appearing, lying in bed.  Alert, oriented to person, place, situation.  Cooperative, lying in bed in NAD.  Respiratory:  Lungs CTAB.  No crackles, wheezes, or rhonchi, no labored breathing.  Cardiovascular:  Heart RRR, no murmur, no edema.  GI:  Abdomen soft, NT/ND and with normoactive BS  Skin/Integumen:  Warm, dry, non-diaphoretic.  MSK: CMS x4 intact.    Medications       lisinopril  20 mg Oral Daily     sodium chloride (PF)  3 mL Intracatheter Q8H     trimethoprim   100 mg Oral Daily       Data   Recent Labs   Lab 10/27/21  0641 10/26/21  2309 10/26/21  1733 10/26/21  1059 10/26/21  1057 10/26/21  1057 10/21/21  1209   WBC 13.5*  --   --   --   --  8.1  --    HGB 10.5* 9.9* 10.9*  --    < > 11.6*  --    MCV 88  --   --   --   --  89  --      --   --   --   --  315  --    INR  --   --  1.09 1.14  --   --   --      --   --   --   --  137 140   POTASSIUM 4.1  --   --   --   --  3.9 4.2   CHLORIDE 107  --   --   --   --  106 106   CO2 21  --   --   --   --  25 26   BUN 13  --   --   --   --  11 20   CR 0.70  --   --   --   --  0.76 0.71   ANIONGAP 8  --   --   --   --  6 8   VLADIMIR 8.3*  --   --   --   --  8.6 9.1   *  --   --   --   --  108* 95   ALBUMIN  --   --   --   --   --  2.7* 3.1*   PROTTOTAL  --   --   --   --   --  6.4* 6.6*   BILITOTAL  --   --   --   --   --  0.4 0.6   ALKPHOS  --   --   --   --   --  119 116   ALT  --   --   --   --   --  32 20   AST  --   --   --   --   --  24 18    < > = values in this interval not displayed.       No results found for this or any previous visit (from the past 24 hour(s)).

## 2021-10-27 NOTE — PLAN OF CARE
[id number]:  Pt is a/ox4. VS stable. Tylenol given for chronic pain. Up with assist x1 w/gb. She is not having as much bloody stools this shift. Clear liquid diet for now. Bowel prep initiated for a colonoscopy tomorrow. New peripheral IV placed to left lower forearm.

## 2021-10-27 NOTE — CONSULTS
[facility]  Gastroenterology Consultation         Floridalma Cunningham  [address]  [place] [address]  86 year old female    Admission Date/Time: 10/26/2021  Primary Care Provider: Alex Whitten  Referring / Attending Physician:  Dr. Sandro Castillo    We were asked to see the patient in consultation by Dr. Sandro Castillo for evaluation of bright red blood per rectum.      CC: bright red blood per rectum    HPI:  Floridalma Cunningham is a 86 year old female who with a history of hypertension, hyperlipidemia, and recurrent UTIs who presented to the ER following an episode of abdominal followed by a large bloody bowel movement. She reports intermittent bloating over last many years. She typically takes TUMS and resolves. Had similar sensation yesterday but pain was lower and more significant and had extreme urgency to use the restroom. Had large bloody loose stool en route to restroom. Abdominal pain resolved. Called clinic and directed to ED. She consequently was admitted for observation and had 4 additional episodes of bloody stool since. NO melena. Denies lightheadedness, dizziness, nausea, vomiting, chest pain, heartburn. Last colonoscopy 12 years ago and was unremarkable. Denies use of blood thinners, does take 2-3 Aleve a week. Denies fever, chills, or sweats.    VSS. Hemoglobin 11.6->10.9->9.9->10.5. WBC 13.5.    ROS: A comprehensive ten point review of systems was negative aside from those in mentioned in the HPI.      PAST MED HX:  I have reviewed this patient's medical history and updated it with pertinent information if needed.   Past Medical History:   Diagnosis Date     Arthritis      Hyperlipidemia      Hypertension      PVC's (premature ventricular contractions)      RBBB (right bundle branch block)        MEDICATIONS:   Prior to Admission Medications   Prescriptions Last Dose Informant Patient Reported? Taking?   CRANBERRY PO 10/25/2021 at Unknown time Self Yes Yes    Sig: Take 1 tablet by mouth daily   Omega-3 Fatty Acids (FISH OIL PO) 10/25/2021 at Unknown time Self Yes Yes   Sig: Take 1,000 mg by mouth daily    Turmeric (QC TUMERIC COMPLEX PO) 10/25/2021 at Unknown time  Yes Yes   Sig: Take 2 tablets by mouth daily   acetaminophen (TYLENOL) 325 MG tablet Past Month at Unknown time  Yes Yes   Sig: Take 650 mg by mouth every 8 hours as needed    atorvastatin (LIPITOR) 10 MG tablet 10/25/2021 at Unknown time  No Yes   Sig: TAKE 1 TABLET BY MOUTH EVERY DAY   calcium carbonate (TUMS) 500 MG chewable tablet Past Week at Unknown time  Yes Yes   Sig: Take 1 chew tab by mouth 2 times daily as needed for heartburn   hydrochlorothiazide (HYDRODIURIL) 25 MG tablet 10/25/2021 at Unknown time  No Yes   Sig: TAKE 1 TABLET BY MOUTH EVERY DAY   lisinopril (ZESTRIL) 20 MG tablet 10/25/2021 at Unknown time  No Yes   Sig: Take 1 tablet (20 mg) by mouth daily   multivitamin w/minerals (MULTI-VITAMIN) tablet 10/25/2021 at Unknown time Self Yes Yes   Sig: Take 1 tablet by mouth daily   polyethylene glycol (MIRALAX) 17 g packet More than a month at Unknown time  No Yes   Sig: Take 17 g by mouth daily   Patient taking differently: Take 1 packet by mouth daily as needed    traMADol (ULTRAM) 50 MG tablet Past Week at Unknown time  Yes Yes   Sig: Take 1 tablet (50 mg) by mouth daily   trimethoprim (TRIMPEX) 100 MG tablet 10/25/2021 at Unknown time  No Yes   Sig: TAKE 1 TABLET BY MOUTH EVERY DAY      Facility-Administered Medications: None       ALLERGIES:   Allergies   Allergen Reactions     Sulfa Drugs        SOCIAL HISTORY:  Social History     Tobacco Use     Smoking status: Former Smoker     Quit date: 1945     Years since quittin.4     Smokeless tobacco: Never Used     Tobacco comment: smoked for two years   Vaping Use     Vaping Use: Never used   Substance Use Topics     Alcohol use: Yes     Comment: rarely     Drug use: No       FAMILY HISTORY:  Family History   Problem Relation Age of  Onset     Coronary Artery Disease Father          of MI AT AGE 59      Bladder Cancer Brother      Irregular heart beat Mother        PHYSICAL EXAM:   General  Alert oriented and comfortable  Vital Signs with Ranges  Temp: 98.1  F (36.7  C) Temp src: Oral BP: 115/78 Pulse: 99   Resp: 18 SpO2: 96 % O2 Device: None (Room air)    I/O last 3 completed shifts:  In: 240 [P.O.:240]  Out: -     Constitutional: healthy, alert and no distress   Cardiovascular: negative, PMI normal. No lifts, heaves, or thrills. RRR. No murmurs, clicks gallops or rub  Respiratory: negative, Percussion normal. Good diaphragmatic excursion. Lungs clear  Abdomen: Abdomen soft, non-tender. BS normal. No masses, organomegaly          ADDITIONAL COMMENTS:   I reviewed the patient's new clinical lab test results.   Recent Labs   Lab Test 10/27/21  0641 10/26/21  2309 10/26/21  1733 10/26/21  1059 10/26/21  1057 10/26/21  1057 21  0702 21  0702   WBC 13.5*  --   --   --   --  8.1  --  13.1*   HGB 10.5* 9.9* 10.9*  --    < > 11.6*   < > 11.2*   MCV 88  --   --   --   --  89  --  90     --   --   --   --  315  --  212   INR  --   --  1.09 1.14  --   --   --   --     < > = values in this interval not displayed.     Recent Labs   Lab Test 10/27/21  0641 10/26/21  1057 10/21/21  1209   POTASSIUM 4.1 3.9 4.2   CHLORIDE 107 106 106   CO2 21 25 26   BUN 13 11 20   ANIONGAP 8 6 8     Recent Labs   Lab Test 10/26/21  1057 10/21/21  1209 21  0702 21  1527 20  1344 20  1026   ALBUMIN 2.7* 3.1* 2.8*   < >  --   --    BILITOTAL 0.4 0.6 0.9   < >  --   --    ALT 32 20 25   < >  --   --    AST 24 18 15   < >  --   --    PROTEIN  --   --   --   --  Negative 30*    < > = values in this interval not displayed.       I reviewed the patient's new imaging results.        CONSULTATION ASSESSMENT AND PLAN:    Floridalma Cunningham is a 86 year old female with c/o multiple episodes of hematochezia. GI consulted on 10/26/21.    Active  Problems:  Hematochezia  Acute blood loss anemia  Patient has had 5 episodes of bright red bloody stools  Hemoglobin 10.9->9.9->10.5  Concern for diverticular bleed, colon malignancy, vs ischemic colitis vs hemorrhoidal bleed    -- Plan colonoscopy tomorrow  -- Clear liquid diet  -- Start colon prep this afternoon  -- Serial hemoglobin        [facility].  Office: [phone]  Cell : [phone] (Dr. Masters)  Cell: [phone] (Marilee Miller PA-C)

## 2021-10-27 NOTE — PLAN OF CARE
A&Ox4. VSS on RA. PRN Tylenol given for left foot pain/neuropathy, repositioning with pillows and walking also alleviate pain per patient. Up SBA with GB and walker. Multiple loose/watery bright red stools with clots noted. Patient had 1 stool incontinence episode, multiple blood clots seen. Reports no abdominal discomfort, VSS, she reports passing gas. Hemoglobin @ 2309 was 9.9, awaiting AM lab draw. Tolerating clear liquid diet. IV-SL. Denies nausea, dizziness, lightheadedness, and SOB. GI is following.

## 2021-10-27 NOTE — UTILIZATION REVIEW
Admission Status; Secondary Review Determination       Under the authority of the Utilization Management Committee, the utilization review process indicated a secondary review on the above patient. The review outcome is based on review of the medical records, discussions with staff, and applying clinical experience noted on the date of the review.     (x) Inpatient Status Appropriate - This patient's medical care is consistent with medical management for inpatient care and reasonable inpatient medical practice.     RATIONALE FOR DETERMINATION     Floridalma Cunningham is an 86 year old female with a history of hypertension, hyperlipidemia, and recurrent UTIs who presented to the ER following an episode of abdominal pain followed by a large bloody bowel movement. ED evaluation showed stable vital signs.  Laboratory evaluation showed hemoglobin of 11.6 with otherwise unremarkable CBC and unremarkable comprehensive metabolic panel.  COVID-19 testing was negative.  She was admitted to the hospital for monitoring and further evaluation.  After admission she had 4 more large and bloody stools.  Hemoglobin dropped to 9.9.  She was seen by gastroenterology and evaluation with colonoscopy was planned for 10/28/2021 after GI prep overnight from 10/27 to 10/28.  Inpatient admission is appropriate for continued monitoring of lower GI bleeding and further evaluation with colonoscopy..      At the time of admission with the information available to the attending physician more than 2 nights Hospital complex care was anticipated, based on patient risk of adverse outcome if treated as outpatient and complex care required. Inpatient admission is appropriate based on the Medicare guidelines.     This document was produced using voice recognition software       The information on this document is developed by the utilization review team in order for the business office to ensure compliance. This only denotes the appropriateness of proper  admission status and does not reflect the quality of care rendered.   The definitions of Inpatient Status and Observation Status used in making the determination above are those provided in the CMS Coverage Manual, Chapter 1 and Chapter 6, section 70.4.   Sincerely,     Roldan Fernandez MD    Utilization Review  Physician Advisor  [facility].

## 2021-10-27 NOTE — PROGRESS NOTES
Observation goals PRIOR TO DISCHARGE     Comments:   -diagnostic tests and consults completed and resulted: not met, monitoring hemoglobin, GI following   -vital signs normal or at patient baseline: met     Nurse to notify provider when observation goals have been met and patient is ready for discharge.

## 2021-10-27 NOTE — PROVIDER NOTIFICATION
"MD Notification    Notified Person: MD    Notified Person Name: Dr Geronimo    Notification Date/Time: 10/26/21 @2347    Notification Interaction: [employer]    Purpose of Notification: FYI Hgb is now 9.9    Orders Received:    Comments: repaged Dr. Young at 0059 \"FYI Hgb is now 9.9\"    "

## 2021-10-27 NOTE — UTILIZATION REVIEW
Admission Status; Secondary Review Determination     Admission Date: 10/26/2021 10:45 AM       Under the authority of the Utilization Management Committee, the utilization review process indicated a secondary review on the above patient.  The review outcome is based on review of the medical records, discussions with staff, and applying clinical experience noted on the date of the review.        (x)      Inpatient Status Appropriate - This patient's medical care is consistent with medical management for inpatient care and reasonable inpatient medical practice.       RATIONALE FOR DETERMINATION      Brief clinical presentation, information copied from the chart, abbreviated and edited for relevant content:     Floridalma Cunningham is a 86 year old female with a history of hypertension, hyperlipidemia, and recurrent UTIs who presented to the ER following an episode of abdominal followed by a large bloody bowel movement. On the morning of admission she developed lower abdominal pain and got up to use the bathroom.  She had a large bloody bowel movement on her way into the bathroom. Admitted to observation. Hgb with a slight drop. GI consult recommended full colonoscopy tomorrow. Started on prep today. Given her age, comorbid conditions, she is staying for the work up to rule out malignancy, diverticular bleed. Will be NPO after midnight.       Currently, more than 2 nights hospital complex care was anticipated. Also, there was a risk of adverse outcome if patient was treated outpatient or observation. High intensity of services anticipated. Inpatient admission appropriate based on Medicare guidelines.       The information on this document is developed by the utilization review team in order for the business office to ensure compliance.  This only denotes the appropriateness of proper admission status and does not reflect the quality of care rendered.         The definitions of Inpatient Status and Observation Status used in  making the determination above are those provided in the CMS Coverage Manual, Chapter 1 and Chapter 6, section 70.4.      Sincerely,      Briana Hogan MD   Utilization Review/ Case Management  [facility].

## 2021-10-28 LAB
ANION GAP SERPL CALCULATED.3IONS-SCNC: 11 MMOL/L (ref 3–14)
BUN SERPL-MCNC: 12 MG/DL (ref 7–30)
CALCIUM SERPL-MCNC: 8.4 MG/DL (ref 8.5–10.1)
CHLORIDE BLD-SCNC: 101 MMOL/L (ref 94–109)
CO2 SERPL-SCNC: 20 MMOL/L (ref 20–32)
COLONOSCOPY: NORMAL
CREAT SERPL-MCNC: 0.84 MG/DL (ref 0.52–1.04)
ERYTHROCYTE [DISTWIDTH] IN BLOOD BY AUTOMATED COUNT: 15.6 % (ref 10–15)
GFR SERPL CREATININE-BSD FRML MDRD: 63 ML/MIN/1.73M2
GLUCOSE BLD-MCNC: 114 MG/DL (ref 70–99)
HCT VFR BLD AUTO: 28.1 % (ref 35–47)
HGB BLD-MCNC: 8.3 G/DL (ref 11.7–15.7)
HGB BLD-MCNC: 8.9 G/DL (ref 11.7–15.7)
HGB BLD-MCNC: 9.2 G/DL (ref 11.7–15.7)
MCH RBC QN AUTO: 27.8 PG (ref 26.5–33)
MCHC RBC AUTO-ENTMCNC: 31.7 G/DL (ref 31.5–36.5)
MCV RBC AUTO: 88 FL (ref 78–100)
PLATELET # BLD AUTO: 352 10E3/UL (ref 150–450)
POTASSIUM BLD-SCNC: 3.2 MMOL/L (ref 3.4–5.3)
POTASSIUM BLD-SCNC: 3.7 MMOL/L (ref 3.4–5.3)
RBC # BLD AUTO: 3.2 10E6/UL (ref 3.8–5.2)
SODIUM SERPL-SCNC: 132 MMOL/L (ref 133–144)
WBC # BLD AUTO: 13.4 10E3/UL (ref 4–11)

## 2021-10-28 PROCEDURE — 250N000013 HC RX MED GY IP 250 OP 250 PS 637: Performed by: HOSPITALIST

## 2021-10-28 PROCEDURE — 84132 ASSAY OF SERUM POTASSIUM: CPT | Performed by: PHYSICIAN ASSISTANT

## 2021-10-28 PROCEDURE — 85018 HEMOGLOBIN: CPT | Performed by: PHYSICIAN ASSISTANT

## 2021-10-28 PROCEDURE — 99153 MOD SED SAME PHYS/QHP EA: CPT | Performed by: INTERNAL MEDICINE

## 2021-10-28 PROCEDURE — 45382 COLONOSCOPY W/CONTROL BLEED: CPT | Performed by: INTERNAL MEDICINE

## 2021-10-28 PROCEDURE — 272N000105 HC DEVICE CLIP QUICK: Performed by: INTERNAL MEDICINE

## 2021-10-28 PROCEDURE — 250N000009 HC RX 250: Performed by: INTERNAL MEDICINE

## 2021-10-28 PROCEDURE — 250N000011 HC RX IP 250 OP 636: Performed by: INTERNAL MEDICINE

## 2021-10-28 PROCEDURE — 250N000013 HC RX MED GY IP 250 OP 250 PS 637: Performed by: PHYSICIAN ASSISTANT

## 2021-10-28 PROCEDURE — 99232 SBSQ HOSP IP/OBS MODERATE 35: CPT | Performed by: PHYSICIAN ASSISTANT

## 2021-10-28 PROCEDURE — 120N000004 HC R&B MS OVERFLOW

## 2021-10-28 PROCEDURE — G0500 MOD SEDAT ENDO SERVICE >5YRS: HCPCS | Performed by: INTERNAL MEDICINE

## 2021-10-28 PROCEDURE — 36415 COLL VENOUS BLD VENIPUNCTURE: CPT | Performed by: PHYSICIAN ASSISTANT

## 2021-10-28 PROCEDURE — 80048 BASIC METABOLIC PNL TOTAL CA: CPT | Performed by: PHYSICIAN ASSISTANT

## 2021-10-28 PROCEDURE — 0W3P8ZZ CONTROL BLEEDING IN GASTROINTESTINAL TRACT, VIA NATURAL OR ARTIFICIAL OPENING ENDOSCOPIC: ICD-10-PCS | Performed by: INTERNAL MEDICINE

## 2021-10-28 PROCEDURE — 85027 COMPLETE CBC AUTOMATED: CPT | Performed by: PHYSICIAN ASSISTANT

## 2021-10-28 PROCEDURE — 258N000003 HC RX IP 258 OP 636: Performed by: INTERNAL MEDICINE

## 2021-10-28 RX ORDER — FENTANYL CITRATE 50 UG/ML
INJECTION, SOLUTION INTRAMUSCULAR; INTRAVENOUS PRN
Status: COMPLETED | OUTPATIENT
Start: 2021-10-28 | End: 2021-10-28

## 2021-10-28 RX ORDER — LIDOCAINE 40 MG/G
CREAM TOPICAL
Status: DISCONTINUED | OUTPATIENT
Start: 2021-10-28 | End: 2021-10-28 | Stop reason: HOSPADM

## 2021-10-28 RX ORDER — PIPERACILLIN SODIUM, TAZOBACTAM SODIUM 3; .375 G/15ML; G/15ML
3.38 INJECTION, POWDER, LYOPHILIZED, FOR SOLUTION INTRAVENOUS EVERY 6 HOURS
Status: DISCONTINUED | OUTPATIENT
Start: 2021-10-28 | End: 2021-11-04 | Stop reason: HOSPADM

## 2021-10-28 RX ORDER — SODIUM CHLORIDE 9 MG/ML
INJECTION, SOLUTION INTRAVENOUS CONTINUOUS PRN
Status: COMPLETED | OUTPATIENT
Start: 2021-10-28 | End: 2021-10-28

## 2021-10-28 RX ORDER — ONDANSETRON 2 MG/ML
4 INJECTION INTRAMUSCULAR; INTRAVENOUS
Status: DISCONTINUED | OUTPATIENT
Start: 2021-10-28 | End: 2021-10-28 | Stop reason: HOSPADM

## 2021-10-28 RX ORDER — LIDOCAINE HYDROCHLORIDE AND EPINEPHRINE 10; 10 MG/ML; UG/ML
INJECTION, SOLUTION INFILTRATION; PERINEURAL PRN
Status: COMPLETED | OUTPATIENT
Start: 2021-10-28 | End: 2021-10-28

## 2021-10-28 RX ORDER — POTASSIUM CHLORIDE 1500 MG/1
40 TABLET, EXTENDED RELEASE ORAL ONCE
Status: COMPLETED | OUTPATIENT
Start: 2021-10-28 | End: 2021-10-28

## 2021-10-28 RX ADMIN — PIPERACILLIN SODIUM AND TAZOBACTAM SODIUM 3.38 G: 3; .375 INJECTION, POWDER, LYOPHILIZED, FOR SOLUTION INTRAVENOUS at 11:21

## 2021-10-28 RX ADMIN — FENTANYL CITRATE 50 MCG: 50 INJECTION, SOLUTION INTRAMUSCULAR; INTRAVENOUS at 10:05

## 2021-10-28 RX ADMIN — FENTANYL CITRATE 50 MCG: 50 INJECTION, SOLUTION INTRAMUSCULAR; INTRAVENOUS at 09:53

## 2021-10-28 RX ADMIN — MAGNESIUM CITRATE 296 ML: 1.75 LIQUID ORAL at 05:00

## 2021-10-28 RX ADMIN — LIDOCAINE HYDROCHLORIDE,EPINEPHRINE BITARTRATE 4 ML: 10; .01 INJECTION, SOLUTION INFILTRATION; PERINEURAL at 10:27

## 2021-10-28 RX ADMIN — LISINOPRIL 20 MG: 20 TABLET ORAL at 09:01

## 2021-10-28 RX ADMIN — TRIMETHOPRIM 100 MG: 100 TABLET ORAL at 09:01

## 2021-10-28 RX ADMIN — SODIUM CHLORIDE 125 ML/HR: 9 INJECTION, SOLUTION INTRAVENOUS at 09:58

## 2021-10-28 RX ADMIN — MIDAZOLAM 2 MG: 1 INJECTION INTRAMUSCULAR; INTRAVENOUS at 09:53

## 2021-10-28 RX ADMIN — PIPERACILLIN SODIUM AND TAZOBACTAM SODIUM 3.38 G: 3; .375 INJECTION, POWDER, LYOPHILIZED, FOR SOLUTION INTRAVENOUS at 23:54

## 2021-10-28 RX ADMIN — PIPERACILLIN SODIUM AND TAZOBACTAM SODIUM 3.38 G: 3; .375 INJECTION, POWDER, LYOPHILIZED, FOR SOLUTION INTRAVENOUS at 17:54

## 2021-10-28 RX ADMIN — ACETAMINOPHEN 650 MG: 325 TABLET, FILM COATED ORAL at 20:39

## 2021-10-28 RX ADMIN — POTASSIUM CHLORIDE 40 MEQ: 1500 TABLET, EXTENDED RELEASE ORAL at 09:02

## 2021-10-28 ASSESSMENT — ACTIVITIES OF DAILY LIVING (ADL)
ADLS_ACUITY_SCORE: 13
ADLS_ACUITY_SCORE: 9
ADLS_ACUITY_SCORE: 13
ADLS_ACUITY_SCORE: 11
ADLS_ACUITY_SCORE: 9
ADLS_ACUITY_SCORE: 13
ADLS_ACUITY_SCORE: 11
ADLS_ACUITY_SCORE: 11
ADLS_ACUITY_SCORE: 13
ADLS_ACUITY_SCORE: 11
ADLS_ACUITY_SCORE: 9
ADLS_ACUITY_SCORE: 13
ADLS_ACUITY_SCORE: 11
ADLS_ACUITY_SCORE: 13
ADLS_ACUITY_SCORE: 11
ADLS_ACUITY_SCORE: 11
ADLS_ACUITY_SCORE: 9
ADLS_ACUITY_SCORE: 7
ADLS_ACUITY_SCORE: 9
ADLS_ACUITY_SCORE: 13

## 2021-10-28 NOTE — PROGRESS NOTES
[facility]  Gastroenterology Progress Note     Floridalma Cunningham MRN# [id number]   YOB: 1935 Age: 86 year old          Assessment and Plan:   Floridalma Cunningham is a 86 year old female with c/o multiple episodes of hematochezia. GI consulted on 10/26/21.     Active Problems:  Hematochezia  Acute blood loss anemia  Patient has had 5 episodes of bright red bloody stools  Hemoglobin 10.5->8.9->9.2->8.9  Concern for diverticular bleed, colon malignancy, vs ischemic colitis vs hemorrhoidal bleed     -- Plan colonoscopy today  -- NPO  -- Serial hemoglobin           Interval History:   denies chest pain, denies shortness of breath, denies abdominal pain, has had a bowel movement in the last 24 hours and doing well; no cp, sob, n/v/d, or abd pain. DId will with colon prep and had ongoing large bloody stools.              Review of Systems:   C: NEGATIVE for fever, chills, change in weight  E/M: NEGATIVE for ear, mouth and throat problems  R: NEGATIVE for significant cough or SOB  CV: NEGATIVE for chest pain, palpitations or peripheral edema             Medications:   I have reviewed this patient's current medications    lisinopril  20 mg Oral Daily     potassium chloride  40 mEq Oral Once     sodium chloride (PF)  3 mL Intracatheter Q8H     trimethoprim  100 mg Oral Daily                  Physical Exam:   Vitals were reviewed  Vital Signs with Ranges  Temp:  [97.6  F (36.4  C)-98.3  F (36.8  C)] 97.8  F (36.6  C)  Pulse:  [] 100  Resp:  [18-22] 18  BP: (118-158)/(60-89) 143/78  SpO2:  [96 %-100 %] 97 %  No intake/output data recorded.  Constitutional: healthy, alert and no distress   Cardiovascular: negative, PMI normal. No lifts, heaves, or thrills. RRR. No murmurs, clicks gallops or rub  Respiratory: negative, Percussion normal. Good diaphragmatic excursion. Lungs clear  Abdomen: Abdomen soft, non-tender. BS normal. No masses, organomegaly             Data:   I reviewed the patient's  new clinical lab test results.   Recent Labs   Lab Test 10/28/21  0625 10/28/21  0022 10/27/21  1938 10/27/21  1212 10/27/21  0641 10/26/21  2309 10/26/21  1733 10/26/21  1059 10/26/21  1057 10/26/21  1057   WBC 13.4*  --   --   --  13.5*  --   --   --   --  8.1   HGB 8.9* 9.2* 8.9*   < > 10.5*   < > 10.9*  --    < > 11.6*   MCV 88  --   --   --  88  --   --   --   --  89     --   --   --  367  --   --   --   --  315   INR  --   --   --   --   --   --  1.09 1.14  --   --     < > = values in this interval not displayed.     Recent Labs   Lab Test 10/28/21  0625 10/27/21  0641 10/26/21  1057   POTASSIUM 3.2* 4.1 3.9   CHLORIDE 101 107 106   CO2 20 21 25   BUN 12 13 11   ANIONGAP 11 8 6     Recent Labs   Lab Test 10/26/21  1057 10/21/21  1209 08/12/21  0702 02/16/21  1527 08/31/20  1344 07/07/20  1026   ALBUMIN 2.7* 3.1* 2.8*   < >  --   --    BILITOTAL 0.4 0.6 0.9   < >  --   --    ALT 32 20 25   < >  --   --    AST 24 18 15   < >  --   --    PROTEIN  --   --   --   --  Negative 30*    < > = values in this interval not displayed.       I reviewed the patient's new imaging results.    All laboratory data reviewed  All imaging studies reviewed by me.    Marilee Miller PA-C,  10/28/2021  [facility]  Office : [phone]  Cell: [phone] (Dr. Masters)  Cell: [phone] (Marilee Miller PA-C)

## 2021-10-28 NOTE — PROGRESS NOTES
[facility]    Hospitalist Progress Note    Date of Service (when I saw the patient): 10/28/2021    Assessment & Plan   Floridalma Cunningham is a 86 year old female with a history of hypertension, hyperlipidemia, and recurrent UTIs who presented to the ER following an episode of abdominal followed by a large bloody bowel movement. After evaluation in the ER, the hospitalist service was contacted to bring her into the hospital for further evaluation.     Abdominal pain with hematochezia, due to diverticular bleeding  Concern for diverticulosis  Acute anemia due to blood loss  Has had some bloating in her abdomen recently, states it resolves with Tums.  On the morning of admission she developed lower abdominal pain and got up to use the bathroom.  She had a large bloody bowel movement on her way into the bathroom.  Felt better afterwards and then did not have any further bloody bowel movements.  Takes Aleve 2 or 3 days/week.  Denies any other NSAID or blood thinner use.  Recently started taking turmeric.  Hemoglobin stable at 11.6 in the ER.  Vitals stable.  While being monitored in the hospital, patient once again started having repeated and frequent bloody stools.    Patient continues to have multiple alec bloody stools both on 10/27 and 10/28, and is admitted to inpatient status now    Continue to monitor hemoglobin every 6 hours.    Monitor abdominal pain and bleeding    GI has been consulted, discussed with Dr. Masters, and greatly appreciate his assistance.    Colonoscopy 10/28 shows severe diverticulosis in the descending colon, with evidence of impacted diverticulum and purulent discharge indicative of diverticulitis.  There is active bleeding coming from the diverticular opening.  There is recent bleeding from a couple diverticuli that were injected and treated with bipolar cautery.  Clips were placed.    Start IV Zosyn 3.375 g IV every 6 hours    Mechanical soft diet    continue to monitor  in hospital tonight    Avoid NSAIDs.  Discontinue turmeric as this can thin the blood and increase risk of bleeding.  Recent Labs   Lab 10/28/21  0625 10/28/21  0022 10/27/21  1938 10/27/21  1212 10/27/21  0641   HGB 8.9* 9.2* 8.9* 10.5* 10.5*        Hypokalemia  Hyponatremia, mild  sodium dropped to 132 from 136, potassium down to 3.2.  Likely GI losses from bowel prep and clear liquid diet.  HCTZ on hold.  --K replacement protocol ordered.  Repeat potassium 3.7  --patient will be resumed on diet today after colonoscopy  --BMP in a.m.    Hypertension  Blood pressure has been variable, overall reasonably controlled    Continue PTA lisinopril with hold parameters.    Hold PTA hydrochlorothiazide for now.     Hyperlipidemia    Hold PTA atorvastatin      Recurrent UTIs  Currently asymptomatic.    Continue PTA prophylactic trimethoprim.  (Noted patient is on IV Zosyn started by GI)     COVID-19 PCR negative    Routine admission COVID-19 PCR testing was negative on 10/26/2021.    She received the Moderna COVID-19 vaccine on 1/17/2021 and 2/14/2021.        Diet:  Advance to mechanical soft as above.  DVT Prophylaxis: Low Risk/Ambulatory with no VTE prophylaxis indicated, reassess if she requires more than one midnight in the hospital  Jackson Catheter: Not present  Central Lines: None  Code Status: No CPR- Do NOT Intubate      Disposition: Expected discharge in 1-2 days pending GI input, stable hemoglobin, and resolution of bleeding.    Bradford Leone    Interval History   Patient lying in bed on my arrival.  Denies abdominal pain presently.  Had several repeated bloody stools overnight with the bowel prep.  States that the blood is still red.  Feels very weak throughout her body.  Denies nausea or vomiting.  Denies fever or chills.  No chest pain or shortness of breath.  Aware of plan for colonoscopy today.    -Data reviewed today: I reviewed all new labs and imaging results over the last 24 hours.     Physical Exam    Temp: 97.8  F (36.6  C) Temp src: Oral BP: (!) 143/78 Pulse: 100   Resp: 18 SpO2: 97 % O2 Device: None (Room air)    Vitals:    10/26/21 1441   Weight: 73.4 kg (161 lb 13.1 oz)     Vital Signs with Ranges  Temp:  [97.6  F (36.4  C)-98.3  F (36.8  C)] 97.8  F (36.6  C)  Pulse:  [] 100  Resp:  [18-22] 18  BP: (118-158)/(60-89) 143/78  SpO2:  [96 %-100 %] 97 %  No intake/output data recorded.    Constitutional: Well-appearing, lying in bed.  Alert, oriented to person, place, situation.  Cooperative, lying in bed in NAD.  Respiratory:  Lungs CTAB.  No crackles, wheezes, or rhonchi, no labored breathing.  Cardiovascular:  Heart RRR, no murmur, no edema.  GI:  Abdomen soft, NT/ND and with normoactive BS  Skin/Integumen:  Warm, dry, non-diaphoretic.  MSK: CMS x4 intact.    Medications       lisinopril  20 mg Oral Daily     sodium chloride (PF)  3 mL Intracatheter Q8H     trimethoprim  100 mg Oral Daily       Data   Recent Labs   Lab 10/28/21  0625 10/28/21  0022 10/27/21  1938 10/27/21  1212 10/27/21  0641 10/26/21  2309 10/26/21  1733 10/26/21  1059 10/26/21  1057 10/26/21  1057 10/21/21  1209 10/21/21  1209   WBC 13.4*  --   --   --  13.5*  --   --   --   --  8.1  --   --    HGB 8.9* 9.2* 8.9*   < > 10.5*   < > 10.9*  --    < > 11.6*  --   --    MCV 88  --   --   --  88  --   --   --   --  89  --   --      --   --   --  367  --   --   --   --  315  --   --    INR  --   --   --   --   --   --  1.09 1.14  --   --   --   --    *  --   --   --  136  --   --   --   --  137   < > 140   POTASSIUM 3.2*  --   --   --  4.1  --   --   --   --  3.9   < > 4.2   CHLORIDE 101  --   --   --  107  --   --   --   --  106   < > 106   CO2 20  --   --   --  21  --   --   --   --  25   < > 26   BUN 12  --   --   --  13  --   --   --   --  11   < > 20   CR 0.84  --   --   --  0.70  --   --   --   --  0.76   < > 0.71   ANIONGAP 11  --   --   --  8  --   --   --   --  6   < > 8   VLADIMIR 8.4*  --   --   --  8.3*  --   --   --    --  8.6   < > 9.1   *  --   --   --  116*  --   --   --   --  108*   < > 95   ALBUMIN  --   --   --   --   --   --   --   --   --  2.7*  --  3.1*   PROTTOTAL  --   --   --   --   --   --   --   --   --  6.4*  --  6.6*   BILITOTAL  --   --   --   --   --   --   --   --   --  0.4  --  0.6   ALKPHOS  --   --   --   --   --   --   --   --   --  119  --  116   ALT  --   --   --   --   --   --   --   --   --  32  --  20   AST  --   --   --   --   --   --   --   --   --  24  --  18    < > = values in this interval not displayed.       No results found for this or any previous visit (from the past 24 hour(s)).

## 2021-10-28 NOTE — PROGRESS NOTES
A&Ox4. A1 gb/w. Bp slightly elevated. Miralax and mg citrate for colonoscopy today. Several large bloody stools. Incontinent bowel. NPO. Continue to monitor.

## 2021-10-28 NOTE — PLAN OF CARE
PT A/Ox4, VSS on room air. Mechanical/soft diet. IV SL. Up with assist of 1 with GB and walker. Large loose bloody stools x2 this shift. Incontinent of bowel. Hgb 8.9, K+ 3.2, replacement given, recheck K+3.7. Colonoscopy completed. Denies nausea or dizziness. GI is following.

## 2021-10-29 ENCOUNTER — APPOINTMENT (OUTPATIENT)
Dept: PHYSICAL THERAPY | Facility: CLINIC | Age: 86
DRG: 357 | End: 2021-10-29
Attending: STUDENT IN AN ORGANIZED HEALTH CARE EDUCATION/TRAINING PROGRAM
Payer: COMMERCIAL

## 2021-10-29 LAB
ANION GAP SERPL CALCULATED.3IONS-SCNC: 6 MMOL/L (ref 3–14)
BUN SERPL-MCNC: 8 MG/DL (ref 7–30)
CALCIUM SERPL-MCNC: 8.1 MG/DL (ref 8.5–10.1)
CHLORIDE BLD-SCNC: 108 MMOL/L (ref 94–109)
CO2 SERPL-SCNC: 21 MMOL/L (ref 20–32)
CREAT SERPL-MCNC: 0.86 MG/DL (ref 0.52–1.04)
ERYTHROCYTE [DISTWIDTH] IN BLOOD BY AUTOMATED COUNT: 15.9 % (ref 10–15)
GFR SERPL CREATININE-BSD FRML MDRD: 61 ML/MIN/1.73M2
GLUCOSE BLD-MCNC: 100 MG/DL (ref 70–99)
GLUCOSE BLDC GLUCOMTR-MCNC: 222 MG/DL (ref 70–99)
GLUCOSE BLDC GLUCOMTR-MCNC: 89 MG/DL (ref 70–99)
HCT VFR BLD AUTO: 27.5 % (ref 35–47)
HGB BLD-MCNC: 7.7 G/DL (ref 11.7–15.7)
HGB BLD-MCNC: 8.2 G/DL (ref 11.7–15.7)
HGB BLD-MCNC: 8.2 G/DL (ref 11.7–15.7)
HGB BLD-MCNC: 8.8 G/DL (ref 11.7–15.7)
HGB BLD-MCNC: NORMAL G/DL
MCH RBC QN AUTO: 28.5 PG (ref 26.5–33)
MCHC RBC AUTO-ENTMCNC: 32 G/DL (ref 31.5–36.5)
MCV RBC AUTO: 89 FL (ref 78–100)
PLATELET # BLD AUTO: 343 10E3/UL (ref 150–450)
POTASSIUM BLD-SCNC: 4.1 MMOL/L (ref 3.4–5.3)
RBC # BLD AUTO: 3.09 10E6/UL (ref 3.8–5.2)
SODIUM SERPL-SCNC: 135 MMOL/L (ref 133–144)
WBC # BLD AUTO: 13.6 10E3/UL (ref 4–11)

## 2021-10-29 PROCEDURE — 250N000011 HC RX IP 250 OP 636: Performed by: INTERNAL MEDICINE

## 2021-10-29 PROCEDURE — 85018 HEMOGLOBIN: CPT | Performed by: PHYSICIAN ASSISTANT

## 2021-10-29 PROCEDURE — 250N000013 HC RX MED GY IP 250 OP 250 PS 637: Performed by: HOSPITALIST

## 2021-10-29 PROCEDURE — 36415 COLL VENOUS BLD VENIPUNCTURE: CPT | Performed by: STUDENT IN AN ORGANIZED HEALTH CARE EDUCATION/TRAINING PROGRAM

## 2021-10-29 PROCEDURE — 99232 SBSQ HOSP IP/OBS MODERATE 35: CPT | Performed by: STUDENT IN AN ORGANIZED HEALTH CARE EDUCATION/TRAINING PROGRAM

## 2021-10-29 PROCEDURE — 80048 BASIC METABOLIC PNL TOTAL CA: CPT | Performed by: PHYSICIAN ASSISTANT

## 2021-10-29 PROCEDURE — 85027 COMPLETE CBC AUTOMATED: CPT | Performed by: PHYSICIAN ASSISTANT

## 2021-10-29 PROCEDURE — 120N000004 HC R&B MS OVERFLOW

## 2021-10-29 PROCEDURE — 97161 PT EVAL LOW COMPLEX 20 MIN: CPT | Mod: GP

## 2021-10-29 PROCEDURE — 36415 COLL VENOUS BLD VENIPUNCTURE: CPT | Performed by: PHYSICIAN ASSISTANT

## 2021-10-29 PROCEDURE — 85018 HEMOGLOBIN: CPT | Performed by: STUDENT IN AN ORGANIZED HEALTH CARE EDUCATION/TRAINING PROGRAM

## 2021-10-29 PROCEDURE — 97530 THERAPEUTIC ACTIVITIES: CPT | Mod: GP

## 2021-10-29 PROCEDURE — 97116 GAIT TRAINING THERAPY: CPT | Mod: GP

## 2021-10-29 RX ORDER — NALOXONE HYDROCHLORIDE 0.4 MG/ML
0.4 INJECTION, SOLUTION INTRAMUSCULAR; INTRAVENOUS; SUBCUTANEOUS
Status: DISCONTINUED | OUTPATIENT
Start: 2021-10-29 | End: 2021-11-04 | Stop reason: HOSPADM

## 2021-10-29 RX ORDER — NALOXONE HYDROCHLORIDE 0.4 MG/ML
0.2 INJECTION, SOLUTION INTRAMUSCULAR; INTRAVENOUS; SUBCUTANEOUS
Status: DISCONTINUED | OUTPATIENT
Start: 2021-10-29 | End: 2021-11-04 | Stop reason: HOSPADM

## 2021-10-29 RX ORDER — FLUMAZENIL 0.1 MG/ML
0.2 INJECTION, SOLUTION INTRAVENOUS
Status: ACTIVE | OUTPATIENT
Start: 2021-10-29 | End: 2021-10-29

## 2021-10-29 RX ADMIN — PIPERACILLIN SODIUM AND TAZOBACTAM SODIUM 3.38 G: 3; .375 INJECTION, POWDER, LYOPHILIZED, FOR SOLUTION INTRAVENOUS at 22:51

## 2021-10-29 RX ADMIN — TRIMETHOPRIM 100 MG: 100 TABLET ORAL at 09:16

## 2021-10-29 RX ADMIN — LISINOPRIL 20 MG: 20 TABLET ORAL at 09:16

## 2021-10-29 RX ADMIN — PIPERACILLIN SODIUM AND TAZOBACTAM SODIUM 3.38 G: 3; .375 INJECTION, POWDER, LYOPHILIZED, FOR SOLUTION INTRAVENOUS at 05:21

## 2021-10-29 RX ADMIN — PIPERACILLIN SODIUM AND TAZOBACTAM SODIUM 3.38 G: 3; .375 INJECTION, POWDER, LYOPHILIZED, FOR SOLUTION INTRAVENOUS at 17:33

## 2021-10-29 RX ADMIN — PIPERACILLIN SODIUM AND TAZOBACTAM SODIUM 3.38 G: 3; .375 INJECTION, POWDER, LYOPHILIZED, FOR SOLUTION INTRAVENOUS at 12:31

## 2021-10-29 ASSESSMENT — ACTIVITIES OF DAILY LIVING (ADL)
ADLS_ACUITY_SCORE: 7
ADLS_ACUITY_SCORE: 7
ADLS_ACUITY_SCORE: 13
ADLS_ACUITY_SCORE: 7
ADLS_ACUITY_SCORE: 13
ADLS_ACUITY_SCORE: 9
ADLS_ACUITY_SCORE: 7
ADLS_ACUITY_SCORE: 10
ADLS_ACUITY_SCORE: 13
ADLS_ACUITY_SCORE: 7
ADLS_ACUITY_SCORE: 7
ADLS_ACUITY_SCORE: 13
ADLS_ACUITY_SCORE: 7
ADLS_ACUITY_SCORE: 7
ADLS_ACUITY_SCORE: 10
ADLS_ACUITY_SCORE: 13
ADLS_ACUITY_SCORE: 7
ADLS_ACUITY_SCORE: 13
ADLS_ACUITY_SCORE: 10
ADLS_ACUITY_SCORE: 9

## 2021-10-29 NOTE — PROGRESS NOTES
A&Ox4. A1 gb/w. [facility] soft diet. Monitor Hemoglobin 8.8 q 6 hr. K replaced. Colonoscopy yesterday, 4 clips. Monitor stool for blood, mucus. IV abx. GI following. VSS, BP slightly elevated. Continue to monitor.

## 2021-10-29 NOTE — PROGRESS NOTES
[facility]    Hospitalist Progress Note    Date of Service (when I saw the patient): 10/29/2021    Assessment & Plan   Floridalma Cunningham is a 86 year old female with a history of hypertension, hyperlipidemia, and recurrent UTIs who presented to the ER following an episode of abdominal followed by a large bloody bowel movement. After evaluation in the ER, the hospitalist service was contacted to bring her into the hospital for further evaluation.     Abdominal pain with hematochezia, due to diverticular bleeding s/p colonoscopy with clips placed  Diverticulitis with acute diverticular bleed  Acute anemia due to blood loss  Has had some bloating in her abdomen recently, states it resolves with Tums.  On the morning of admission she developed lower abdominal pain and got up to use the bathroom.  She had a large bloody bowel movement on her way into the bathroom.  Felt better afterwards and then did not have any further bloody bowel movements.  Takes Aleve 2 or 3 days/week.  Denies any other NSAID or blood thinner use.  Recently started taking turmeric.  Hemoglobin stable at 11.6 in the ER.  Vitals stable.  While being monitored in the hospital, patient once again started having repeated and frequent bloody stools.  Colonoscopy 10/28 shows severe diverticulosis in the descending colon, with evidence of impacted diverticulum and purulent discharge indicative of diverticulitis.  There is active bleeding coming from the diverticular opening.  There is recent bleeding from a couple diverticuli that were injected and treated with bipolar cautery.  Clips were placed.    > 2 BMs yesterday likey residual bowel prep, no BM over the night  > Hb stable at ~8 range  > continue IV Zosyn 3.375 g IV every 6 hours, trend WBC tomorrow  > Mechanical soft diet  > Monitor Hb q12h for now  > Avoid NSAIDs.  Discontinue turmeric as this can thin the blood and increase risk of bleeding.    Hypokalemia  Hyponatremia,  mild  sodium dropped to 132 from 136, potassium down to 3.2.  Likely GI losses from bowel prep and clear liquid diet.      > K replacement protocol ordered  > Continue to hold hydrochlorothiazide and monitor daily BMP    Hypertension  Blood pressure has been variable, overall reasonably controlled    >Continue PTA lisinopril with hold parameters.  >Hold PTA hydrochlorothiazide for now.     Hyperlipidemia  Hold PTA atorvastatin      Recurrent UTIs  Currently asymptomatic.  Continue PTA prophylactic trimethoprim.  (Noted patient is on IV Zosyn started by GI)     COVID-19 PCR negative  Routine admission COVID-19 PCR testing was negative on 10/26/2021.  She received the Moderna COVID-19 vaccine on 1/17/2021 and 2/14/2021.        Diet:  Advance to mechanical soft as above.  DVT Prophylaxis: PCDs in light of GI bleed  Jackson Catheter: Not present  Central Lines: None  Code Status: No CPR- Do NOT Intubate      Disposition: Expected discharge in 1-2 days pending GI input, stable hemoglobin, and resolution of bleeding.    [facility]    Interval History   Patient appears comfortable today. She denies any further bleeding or BMs over the night. Mild pain at her sacrum that feels like pressure and is new. Located right where she sits. No SOA.  No cough. No LE edema.    -Data reviewed today: I reviewed all new labs and imaging results over the last 24 hours.     Physical Exam   Temp: 98.2  F (36.8  C) Temp src: Oral BP: 109/48 Pulse: 92   Resp: 17 SpO2: 96 % O2 Device: None (Room air)    Vitals:    10/26/21 1441   Weight: 73.4 kg (161 lb 13.1 oz)     Vital Signs with Ranges  Temp:  [97.6  F (36.4  C)-99.3  F (37.4  C)] 98.2  F (36.8  C)  Pulse:  [] 92  Resp:  [17-22] 17  BP: (109-143)/(48-78) 109/48  SpO2:  [96 %-100 %] 96 %  I/O last 3 completed shifts:  In: 480 [P.O.:480]  Out: -     Constitutional: Well-appearing, lying in bed.  Alert, oriented to person, place, situation.  Cooperative, lying in bed in  NAD.  Respiratory:  Lungs CTAB.  No crackles, wheezes, or rhonchi, no labored breathing.  Cardiovascular:  Heart RRR, no murmur, no edema.  GI:  Abdomen soft, NT/ND and with normoactive BS  Skin/Integumen:  Warm, dry, non-diaphoretic.  MSK: CMS x4 intact.    Medications       lisinopril  20 mg Oral Daily     piperacillin-tazobactam  3.375 g Intravenous Q6H     sodium chloride (PF)  3 mL Intracatheter Q8H     trimethoprim  100 mg Oral Daily       Data   Recent Labs   Lab 10/29/21  1234 10/29/21  1128 10/29/21  0839 10/29/21  0553 10/29/21  0008 10/28/21  1721 10/28/21  1242 10/28/21  0625 10/28/21  0625 10/27/21  1212 10/27/21  0641 10/26/21  2309 10/26/21  1733 10/26/21  1059 10/26/21  1057 10/26/21  1057   WBC  --   --   --  13.6*  --   --   --   --  13.4*  --  13.5*  --   --   --    < > 8.1   HGB 8.2*  --   --  8.8* 8.2*   < >  --   --  8.9*   < > 10.5*   < > 10.9*  --    < > 11.6*   MCV  --   --   --  89  --   --   --   --  88  --  88  --   --   --    < > 89   PLT  --   --   --  343  --   --   --   --  352  --  367  --   --   --    < > 315   INR  --   --   --   --   --   --   --   --   --   --   --   --  1.09 1.14  --   --    NA  --   --   --  135  --   --   --   --  132*  --  136  --   --   --    < > 137   POTASSIUM  --   --   --  4.1  --   --  3.7  --  3.2*  --  4.1  --   --   --    < > 3.9   CHLORIDE  --   --   --  108  --   --   --   --  101  --  107  --   --   --    < > 106   CO2  --   --   --  21  --   --   --   --  20  --  21  --   --   --    < > 25   BUN  --   --   --  8  --   --   --   --  12  --  13  --   --   --    < > 11   CR  --   --   --  0.86  --   --   --   --  0.84  --  0.70  --   --   --    < > 0.76   ANIONGAP  --   --   --  6  --   --   --   --  11  --  8  --   --   --    < > 6   VLADIMIR  --   --   --  8.1*  --   --   --   --  8.4*  --  8.3*  --   --   --    < > 8.6   GLC  --  222* 89 100*  --   --   --    < > 114*  --  116*  --   --   --    < > 108*   ALBUMIN  --   --   --   --   --   --   --   --    --   --   --   --   --   --   --  2.7*   PROTTOTAL  --   --   --   --   --   --   --   --   --   --   --   --   --   --   --  6.4*   BILITOTAL  --   --   --   --   --   --   --   --   --   --   --   --   --   --   --  0.4   ALKPHOS  --   --   --   --   --   --   --   --   --   --   --   --   --   --   --  119   ALT  --   --   --   --   --   --   --   --   --   --   --   --   --   --   --  32   AST  --   --   --   --   --   --   --   --   --   --   --   --   --   --   --  24    < > = values in this interval not displayed.       No results found for this or any previous visit (from the past 24 hour(s)).

## 2021-10-29 NOTE — PLAN OF CARE
Physical Therapy Discharge Summary    Reason for therapy discharge:    Patient at baseline functional mobility, safe to d/c home with HHPT.    Progress towards therapy goal(s). See goals on Care Plan in [facility] electronic health record for goal details.  Patient met goals safe to d/c home with use of 4WW for all mobility.    Therapy recommendation(s):    Patient safe to discharge home with use of 4WW for all functional mobility. Recommend continue with HHPT to continue increasing activity tolerance.

## 2021-10-29 NOTE — PROGRESS NOTES
10/29/21 1504   Quick Adds   Type of Visit Initial PT Evaluation   Living Environment   People in home alone   Current Living Arrangements independent living facility  (in independent living, has AL attached)   Home Accessibility no concerns   Transportation Anticipated family or friend will provide  (celestinougher or MALIKA)   Living Environment Comments Pt lives in ILF, reports no stairs, has walk-in shower with grab bars, grab bars by toilet   Self-Care   Usual Activity Tolerance moderate   Regular Exercise Yes   Activity/Exercise Type other (see comments)  (Physical therapy 2x/week)   Exercise Amount/Frequency 2 times/wk   Equipment Currently Used at Home grab bar, toilet;grab bar, tub/shower;walker, rolling;raised toilet seat  (4WW, bed rail)   Disability/Function   Hearing Difficulty or Deaf no   Wear Glasses or Blind yes   Vision Management reading glasses   Concentrating, Remembering or Making Decisions Difficulty no   Difficulty Communicating no   Difficulty Eating/Swallowing no   Walking or Climbing Stairs Difficulty yes   Walking or Climbing Stairs ambulation difficulty, requires equipment  (4WW)   Mobility Management 4WW   Dressing/Bathing Difficulty no   Toileting issues no   Doing Errands Independently Difficulty (such as shopping) yes   Errands Management children do errands, patient reports sitting in car   Fall history within last six months no   General Information   Onset of Illness/Injury or Date of Surgery 10/26/21   Referring Physician Nevaeh Oden, DO   Patient/Family Therapy Goals Statement (PT) To return home   Pertinent History of Current Problem (include personal factors and/or comorbidities that impact the POC) Patient presented to the ER following an episode of abdominal followed by a large bloody bowel movement. After evaluation in the ER, the hospitalist service was contacted. 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, hypertension, hyperlipidemia, and recurrent UTIs    Weight-Bearing Status - LLE full  weight-bearing   Weight-Bearing Status - RLE full weight-bearing   Heart Disease Risk Factors Dislipidemia;High blood pressure   Cognition   Orientation Status (Cognition) oriented x 4   Pain Assessment   Patient Currently in Pain No   Integumentary/Edema   Integumentary/Edema other (describe)   Integumentary/Edema Comments Patient states buttocks wounds   Posture    Posture Forward head position;Protracted shoulders   Range of Motion (ROM)   ROM Comment JAYDA LEs WNL, demonstrates decreased JAYDA UE flexion, abd   Strength   Manual Muscle Testing Quick Adds Strength WFL;Able to perform R SLR;Able to perform L SLR   Bed Mobility   Bed Mobility rolling left;rolling right;supine-sit;sit-supine   Rolling Left [place] (Bed Mobility) independent   Rolling Right [place] (Bed Mobility) independent   Supine-Sit [place] (Bed Mobility) modified independence   Sit-Supine [place] (Bed Mobility) modified independence   Assistive Device (Bed Mobility) bed rails   Comment (Bed Mobility) Patient requires increased time and use of R bed rail for supine <> sit (patient has R bed rail at home she states)   Transfers   Transfers bed-chair transfer;sit-stand transfer   Transfer Safety Comments Patient verbally cued to lock/unlock brakes   Bed-Chair Transfer   Bed-Chair [place] (Transfers) modified independence   Assistive Device (Bed-Chair Transfers) walker, 4-wheeled   Sit-Stand Transfer   Sit-Stand [place] (Transfers) modified independence   Assistive Device (Sit-Stand Transfers) walker, 4-wheeled   Gait/Stairs (Locomotion)   [place] Level (Gait) modified independence;verbal cues   Assistive Device (Gait) walker, 4-wheeled   Distance in Feet (Required for LE Total Joints) 250   Pattern (Gait) step-through   Negotiation (Stairs) other (see comments)  (Patient declined use of stairs)   Comment (Gait/Stairs) Patient ambulated x250ft w/ 4WW mod I demonstrating good stability and safety awareness in busy  hallway. Patient demonstrated slightly outstretched AD with downward gaze, able to correct with verbal cues.   Balance   Balance Comments Requires use of 4WW for stance and ambulation   Sensory Examination   Sensory Perception other (describe)   Sensory Perception Comments Patient reports JAYDA decreased sensation, has spoken to PCP about coldness and decreased sensation on JAYDA soles of feet   Clinical Impression   Criteria for Skilled Therapeutic Intervention yes, treatment indicated   PT Diagnosis (PT) Impaired transfers, decreased activity tolerance   Influenced by the following impairments Generalized weakness   Functional limitations due to impairments Impaired transfers and activity tolerance   Clinical Presentation Stable/Uncomplicated   Clinical Presentation Rationale Due to current status, PMH and social support   Clinical Decision Making (Complexity) low complexity   Therapy Frequency (PT) One time eval and treatment only   Predicted Duration of Therapy Intervention (days/wks) 1 day   Planned Therapy Interventions (PT) transfer training;strengthening;patient/family education;gait training;progressive activity/exercise   Anticipated Equipment Needs at Discharge (PT)   (none, has 4WW, bed rail, raised toilet with handles)   Risk & Benefits of therapy have been explained evaluation/treatment results reviewed;care plan/treatment goals reviewed;risks/benefits reviewed;current/potential barriers reviewed;participants voiced agreement with care plan;participants included;patient   PT Discharge Planning    PT Discharge Recommendation (DC Rec) home;home with home care physical therapy   PT Rationale for DC Rec Patient is at her baseline mod I w/ 4WW living in IND living. Currently has HHPT to increase strength and endurance, recommend continue with HHPT to maximize strength and activity tolerance.   PT Brief overview of current status  Bed mobility mod I, transfers and ambulation x250 ft w/ 4WW mod I   Total  Evaluation Time   Total Evaluation Time (Minutes) 12

## 2021-10-29 NOTE — PROGRESS NOTES
[facility]  Gastroenterology Progress Note     Floridalma Cunningham MRN# [id number]   YOB: 1935 Age: 86 year old          Assessment and Plan:   Floridalma Cunningham is a 86 year old female with c/o multiple episodes of hematochezia. GI consulted on 10/26/21.     Active Problems:  Hematochezia  Acute blood loss anemia  Diverticulitis  Diverticular bleed  Hemoglobin stable 8-9. No rectal bleeding since colonsocopy  10/28 Colonoscopy noted severe diverticulosis in descending colon, narrowing in association with diverticular spasm, erythema at diverticular opening, impacted diverticula with purulent discharge. Consistent with diverticulitis. Active bleeding noted from diverticular opening. Clips placed.    -- Continue with IV Zosyn- at discharge switch to oral antibiotics- prefer Augmentin vs cipro/flagyl for total of 7 days.  -- Soft diet for 3-4 days. Then can advance to regular diet  -- If hemoglobin stable, no recurrent rectal bleeding, ok with GI to discharge patient later today vs tomorrow  -- [facility] will contact patient for outpatient f/u to be seen in 1-2 weeks              Interval History:   no new complaints, doing well, denies chest pain, denies shortness of breath, denies abdominal pain and doing well; no cp, sob, n/v/d, or abd pain.              Review of Systems:   C: NEGATIVE for fever, chills, change in weight  E/M: NEGATIVE for ear, mouth and throat problems  R: NEGATIVE for significant cough or SOB  CV: NEGATIVE for chest pain, palpitations or peripheral edema             Medications:   I have reviewed this patient's current medications    lisinopril  20 mg Oral Daily     piperacillin-tazobactam  3.375 g Intravenous Q6H     sodium chloride (PF)  3 mL Intracatheter Q8H     trimethoprim  100 mg Oral Daily                  Physical Exam:   Vitals were reviewed  Vital Signs with Ranges  Temp:  [97.6  F (36.4  C)-99.3  F (37.4  C)] 99.1  F (37.3  C)  Pulse:  []  91  Resp:  [16-36] 17  BP: ()/(71-98) 143/78  SpO2:  [97 %-100 %] 97 %  I/O last 3 completed shifts:  In: 480 [P.O.:480]  Out: -   Constitutional: healthy, alert and no distress   Cardiovascular: negative, PMI normal. No lifts, heaves, or thrills. RRR. No murmurs, clicks gallops or rub  Respiratory: negative, Percussion normal. Good diaphragmatic excursion. Lungs clear  Abdomen: Abdomen soft, non-tender. BS normal. No masses, organomegaly             Data:   I reviewed the patient's new clinical lab test results.   Recent Labs   Lab Test 10/29/21  0553 10/29/21  0008 10/28/21  1721 10/28/21  0625 10/28/21  0625 10/27/21  1212 10/27/21  0641 10/26/21  2309 10/26/21  1733 10/26/21  1059 10/26/21  1057   WBC 13.6*  --   --   --  13.4*  --  13.5*  --   --   --    < >   HGB 8.8* 8.2* 8.3*   < > 8.9*   < > 10.5*   < > 10.9*  --    < >   MCV 89  --   --   --  88  --  88  --   --   --    < >     --   --   --  352  --  367  --   --   --    < >   INR  --   --   --   --   --   --   --   --  1.09 1.14  --     < > = values in this interval not displayed.     Recent Labs   Lab Test 10/29/21  0553 10/28/21  1242 10/28/21  0625 10/27/21  0641 10/27/21  0641   POTASSIUM 4.1 3.7 3.2*   < > 4.1   CHLORIDE 108  --  101  --  107   CO2 21  --  20  --  21   BUN 8  --  12  --  13   ANIONGAP 6  --  11  --  8    < > = values in this interval not displayed.     Recent Labs   Lab Test 10/26/21  1057 10/21/21  1209 08/12/21  0702 02/16/21  1527 08/31/20  1344 07/07/20  1026   ALBUMIN 2.7* 3.1* 2.8*   < >  --   --    BILITOTAL 0.4 0.6 0.9   < >  --   --    ALT 32 20 25   < >  --   --    AST 24 18 15   < >  --   --    PROTEIN  --   --   --   --  Negative 30*    < > = values in this interval not displayed.       I reviewed the patient's new imaging results.    All laboratory data reviewed  All imaging studies reviewed by me.    Marilee Miller PA-C,  10/29/2021  [facility]  Office : [phone]  Cell: [phone] (Dr. Masters)  Cell: [phone] (Marilee Miller PA-C)

## 2021-10-29 NOTE — PROVIDER NOTIFICATION
MD Notification    Notified Person: MD    Notified Person Name: DR. Young    Notification Date/Time: 10-29-21 0087    Notification Interaction: page    Purpose of Notification: FYI 0000 Hemoglobin lab 8.2    Orders Received:    Comments:

## 2021-10-29 NOTE — PLAN OF CARE
AxOx4, VSS on RA. Mechanical/soft diet. A1 GB and walker.1 small smear of BM. Hgb q6hrs, now 8.3. K+ 3.7 on recheck. Colonoscopy completed, 4 clips placed. IV Zosyn. Denies nausea or dizziness. GI following. Continue to monitor.

## 2021-10-29 NOTE — PLAN OF CARE
RN:  Patient A/O x4.  VSS on RA.  IV saline locked.  Tolerating mech/soft diet.  Able to call and state needs.  Up with SBA/walker/GB.  Continues on IV zosyn for diverticulitis.  Monitoring hemoglobin every 8 hours.  (8.2 on last check).  X1 bloody stool with blood in her urine this shift.   Patient anticipates discharging to home tomorrow.  Family of patient to transport.

## 2021-10-30 LAB
ABO/RH(D): NORMAL
ANION GAP SERPL CALCULATED.3IONS-SCNC: 6 MMOL/L (ref 3–14)
ANTIBODY SCREEN: NEGATIVE
BASOPHILS # BLD AUTO: 0 10E3/UL (ref 0–0.2)
BASOPHILS NFR BLD AUTO: 0 %
BLD PROD TYP BPU: NORMAL
BLD PROD TYP BPU: NORMAL
BLOOD COMPONENT TYPE: NORMAL
BLOOD COMPONENT TYPE: NORMAL
BUN SERPL-MCNC: 9 MG/DL (ref 7–30)
CALCIUM SERPL-MCNC: 7.6 MG/DL (ref 8.5–10.1)
CHLORIDE BLD-SCNC: 107 MMOL/L (ref 94–109)
CO2 SERPL-SCNC: 23 MMOL/L (ref 20–32)
CODING SYSTEM: NORMAL
CODING SYSTEM: NORMAL
COLONOSCOPY: NORMAL
CREAT SERPL-MCNC: 0.79 MG/DL (ref 0.52–1.04)
CROSSMATCH: NORMAL
CROSSMATCH: NORMAL
EOSINOPHIL # BLD AUTO: 0.4 10E3/UL (ref 0–0.7)
EOSINOPHIL NFR BLD AUTO: 4 %
ERYTHROCYTE [DISTWIDTH] IN BLOOD BY AUTOMATED COUNT: 15.9 % (ref 10–15)
GFR SERPL CREATININE-BSD FRML MDRD: 68 ML/MIN/1.73M2
GLUCOSE BLD-MCNC: 88 MG/DL (ref 70–99)
HCT VFR BLD AUTO: 22.6 % (ref 35–47)
HGB BLD-MCNC: 7.4 G/DL (ref 11.7–15.7)
HGB BLD-MCNC: 8.7 G/DL (ref 11.7–15.7)
IMM GRANULOCYTES # BLD: 0 10E3/UL
IMM GRANULOCYTES NFR BLD: 0 %
ISSUE DATE AND TIME: NORMAL
LYMPHOCYTES # BLD AUTO: 2.1 10E3/UL (ref 0.8–5.3)
LYMPHOCYTES NFR BLD AUTO: 20 %
MCH RBC QN AUTO: 29 PG (ref 26.5–33)
MCHC RBC AUTO-ENTMCNC: 32.7 G/DL (ref 31.5–36.5)
MCV RBC AUTO: 89 FL (ref 78–100)
MONOCYTES # BLD AUTO: 0.7 10E3/UL (ref 0–1.3)
MONOCYTES NFR BLD AUTO: 6 %
NEUTROPHILS # BLD AUTO: 7 10E3/UL (ref 1.6–8.3)
NEUTROPHILS NFR BLD AUTO: 70 %
NRBC # BLD AUTO: 0 10E3/UL
NRBC BLD AUTO-RTO: 0 /100
PLATELET # BLD AUTO: 269 10E3/UL (ref 150–450)
POTASSIUM BLD-SCNC: 3.9 MMOL/L (ref 3.4–5.3)
RBC # BLD AUTO: 2.55 10E6/UL (ref 3.8–5.2)
SODIUM SERPL-SCNC: 136 MMOL/L (ref 133–144)
SPECIMEN EXPIRATION DATE: NORMAL
UNIT ABO/RH: NORMAL
UNIT ABO/RH: NORMAL
UNIT NUMBER: NORMAL
UNIT NUMBER: NORMAL
UNIT STATUS: NORMAL
UNIT STATUS: NORMAL
UNIT TYPE ISBT: 6200
UNIT TYPE ISBT: 6200
WBC # BLD AUTO: 10.3 10E3/UL (ref 4–11)

## 2021-10-30 PROCEDURE — 272N000105 HC DEVICE CLIP QUICK: Performed by: INTERNAL MEDICINE

## 2021-10-30 PROCEDURE — 85018 HEMOGLOBIN: CPT | Performed by: HOSPITALIST

## 2021-10-30 PROCEDURE — 36415 COLL VENOUS BLD VENIPUNCTURE: CPT | Performed by: STUDENT IN AN ORGANIZED HEALTH CARE EDUCATION/TRAINING PROGRAM

## 2021-10-30 PROCEDURE — 99153 MOD SED SAME PHYS/QHP EA: CPT | Performed by: INTERNAL MEDICINE

## 2021-10-30 PROCEDURE — 45335 SIGMOIDOSCOPY W/SUBMUC INJ: CPT | Performed by: INTERNAL MEDICINE

## 2021-10-30 PROCEDURE — 250N000013 HC RX MED GY IP 250 OP 250 PS 637: Performed by: HOSPITALIST

## 2021-10-30 PROCEDURE — 250N000011 HC RX IP 250 OP 636: Performed by: INTERNAL MEDICINE

## 2021-10-30 PROCEDURE — 45334 SIGMOIDOSCOPY FOR BLEEDING: CPT | Performed by: INTERNAL MEDICINE

## 2021-10-30 PROCEDURE — 999N000128 HC STATISTIC PERIPHERAL IV START W/O US GUIDANCE

## 2021-10-30 PROCEDURE — 86923 COMPATIBILITY TEST ELECTRIC: CPT | Performed by: STUDENT IN AN ORGANIZED HEALTH CARE EDUCATION/TRAINING PROGRAM

## 2021-10-30 PROCEDURE — G0500 MOD SEDAT ENDO SERVICE >5YRS: HCPCS | Performed by: INTERNAL MEDICINE

## 2021-10-30 PROCEDURE — 0W3P8ZZ CONTROL BLEEDING IN GASTROINTESTINAL TRACT, VIA NATURAL OR ARTIFICIAL OPENING ENDOSCOPIC: ICD-10-PCS | Performed by: INTERNAL MEDICINE

## 2021-10-30 PROCEDURE — 80048 BASIC METABOLIC PNL TOTAL CA: CPT | Performed by: STUDENT IN AN ORGANIZED HEALTH CARE EDUCATION/TRAINING PROGRAM

## 2021-10-30 PROCEDURE — 120N000004 HC R&B MS OVERFLOW

## 2021-10-30 PROCEDURE — 85025 COMPLETE CBC W/AUTO DIFF WBC: CPT | Performed by: STUDENT IN AN ORGANIZED HEALTH CARE EDUCATION/TRAINING PROGRAM

## 2021-10-30 PROCEDURE — 86900 BLOOD TYPING SEROLOGIC ABO: CPT | Performed by: STUDENT IN AN ORGANIZED HEALTH CARE EDUCATION/TRAINING PROGRAM

## 2021-10-30 PROCEDURE — 99233 SBSQ HOSP IP/OBS HIGH 50: CPT | Performed by: STUDENT IN AN ORGANIZED HEALTH CARE EDUCATION/TRAINING PROGRAM

## 2021-10-30 PROCEDURE — P9016 RBC LEUKOCYTES REDUCED: HCPCS | Performed by: STUDENT IN AN ORGANIZED HEALTH CARE EDUCATION/TRAINING PROGRAM

## 2021-10-30 PROCEDURE — 36415 COLL VENOUS BLD VENIPUNCTURE: CPT | Performed by: HOSPITALIST

## 2021-10-30 RX ORDER — FENTANYL CITRATE 50 UG/ML
INJECTION, SOLUTION INTRAMUSCULAR; INTRAVENOUS PRN
Status: COMPLETED | OUTPATIENT
Start: 2021-10-30 | End: 2021-10-30

## 2021-10-30 RX ORDER — EPINEPHRINE IN SOD CHLOR,ISO 1 MG/10 ML
SYRINGE (ML) INTRAVENOUS PRN
Status: COMPLETED | OUTPATIENT
Start: 2021-10-30 | End: 2021-10-30

## 2021-10-30 RX ORDER — ONDANSETRON 2 MG/ML
INJECTION INTRAMUSCULAR; INTRAVENOUS PRN
Status: COMPLETED | OUTPATIENT
Start: 2021-10-30 | End: 2021-10-30

## 2021-10-30 RX ADMIN — PIPERACILLIN SODIUM AND TAZOBACTAM SODIUM 3.38 G: 3; .375 INJECTION, POWDER, LYOPHILIZED, FOR SOLUTION INTRAVENOUS at 17:30

## 2021-10-30 RX ADMIN — PIPERACILLIN SODIUM AND TAZOBACTAM SODIUM 3.38 G: 3; .375 INJECTION, POWDER, LYOPHILIZED, FOR SOLUTION INTRAVENOUS at 04:20

## 2021-10-30 RX ADMIN — MIDAZOLAM 2 MG: 1 INJECTION INTRAMUSCULAR; INTRAVENOUS at 11:49

## 2021-10-30 RX ADMIN — TRIMETHOPRIM 100 MG: 100 TABLET ORAL at 08:20

## 2021-10-30 RX ADMIN — LISINOPRIL 20 MG: 20 TABLET ORAL at 08:20

## 2021-10-30 RX ADMIN — EPINEPHRINE 3 ML: 0.1 INJECTION, SOLUTION ENDOTRACHEAL; INTRACARDIAC; INTRAVENOUS at 12:31

## 2021-10-30 RX ADMIN — FENTANYL CITRATE 100 MCG: 50 INJECTION, SOLUTION INTRAMUSCULAR; INTRAVENOUS at 11:48

## 2021-10-30 RX ADMIN — PIPERACILLIN SODIUM AND TAZOBACTAM SODIUM 3.38 G: 3; .375 INJECTION, POWDER, LYOPHILIZED, FOR SOLUTION INTRAVENOUS at 10:49

## 2021-10-30 RX ADMIN — ACETAMINOPHEN 650 MG: 325 TABLET, FILM COATED ORAL at 00:36

## 2021-10-30 RX ADMIN — PIPERACILLIN SODIUM AND TAZOBACTAM SODIUM 3.38 G: 3; .375 INJECTION, POWDER, LYOPHILIZED, FOR SOLUTION INTRAVENOUS at 23:54

## 2021-10-30 RX ADMIN — ONDANSETRON 4 MG: 2 INJECTION INTRAMUSCULAR; INTRAVENOUS at 12:27

## 2021-10-30 ASSESSMENT — ACTIVITIES OF DAILY LIVING (ADL)
ADLS_ACUITY_SCORE: 15
ADLS_ACUITY_SCORE: 13
ADLS_ACUITY_SCORE: 15
ADLS_ACUITY_SCORE: 13
ADLS_ACUITY_SCORE: 15
ADLS_ACUITY_SCORE: 15
ADLS_ACUITY_SCORE: 13
ADLS_ACUITY_SCORE: 13
ADLS_ACUITY_SCORE: 15
ADLS_ACUITY_SCORE: 13
ADLS_ACUITY_SCORE: 15
ADLS_ACUITY_SCORE: 13
ADLS_ACUITY_SCORE: 15

## 2021-10-30 NOTE — PROGRESS NOTES
[facility]    Hospitalist Progress Note    Date of Service (when I saw the patient): 10/30/2021    Assessment & Plan   Floridalma Cunningham is a 86 year old female with a history of hypertension, hyperlipidemia, and recurrent UTIs who presented to the ER following an episode of abdominal followed by a large bloody bowel movement. After evaluation in the ER, the hospitalist service was contacted to bring her into the hospital for further evaluation.     Abdominal pain with hematochezia, due to diverticular bleeding s/p colonoscopy with clips placed  Diverticulitis with acute diverticular bleed  Acute anemia due to blood loss  Has had some bloating in her abdomen recently, states it resolves with Tums.  On the morning of admission she developed lower abdominal pain and got up to use the bathroom.  She had a large bloody bowel movement on her way into the bathroom.  Felt better afterwards and then did not have any further bloody bowel movements.  Takes Aleve 2 or 3 days/week.  Denies any other NSAID or blood thinner use.  Recently started taking turmeric.  Hemoglobin stable at 11.6 in the ER.  Vitals stable.  While being monitored in the hospital, patient once again started having repeated and frequent bloody stools.  Colonoscopy 10/28 shows severe diverticulosis in the descending colon, with evidence of impacted diverticulum and purulent discharge indicative of diverticulitis.  There is active bleeding coming from the diverticular opening.  There is recent bleeding from a couple diverticuli that were injected and treated with bipolar cautery.  Clips were placed.    > Hb drop today down to 7.4, large BM with active bleeding in the toilet, GI paged and planning flex sig this morning  > If GI is unable to stop bleeding may need IR intervention  > Plan to transfuse 1 unit for active bleeding  > Continue to monitor hemoglobin closely, transfuse for <7  > continue IV zosyn  > Avoid NSAIDs.  Discontinue  turmeric as this can thin the blood and increase risk of bleeding.    Hypokalemia  Hyponatremia, mild  sodium dropped to 132 from 136, potassium down to 3.2.  Likely GI losses from bowel prep and clear liquid diet.      > K replacement protocol ordered  > Continue to hold hydrochlorothiazide and monitor daily BMP    Hypertension  Blood pressure has been variable, overall reasonably controlled    > Continue PTA lisinopril with hold parameters.  > Hold PTA hydrochlorothiazide for now.     Hyperlipidemia  Hold PTA atorvastatin      Recurrent UTIs  Currently asymptomatic.  Continue PTA prophylactic trimethoprim.  (Noted patient is on IV Zosyn started by GI)     COVID-19 PCR negative  Routine admission COVID-19 PCR testing was negative on 10/26/2021.  She received the Moderna COVID-19 vaccine on 1/17/2021 and 2/14/2021.        Diet:  Advance to mechanical soft as above.  DVT Prophylaxis: PCDs in light of GI bleed  Jackson Catheter: Not present  Central Lines: None  Code Status: No CPR- Do NOT Intubate      Disposition: TBD pending resolution of GI bleed    Nevaeh Oden    Interval History   Patient comfortable this morning but disappointed because she had bloody BM. She reported passing gas and then feeling wetness with large amount of blood saturating the pad. Blood dripping into toilet after she got on it. She denies any pain. No dizziness when up. No SOA or cough. She is aware of plan and need to stay in the hospital until this is resolved.    -Data reviewed today: I reviewed all new labs and imaging results over the last 24 hours.     Physical Exam   Temp: 98.3  F (36.8  C) Temp src: Oral BP: 138/86 Pulse: 90   Resp: 18 SpO2: 96 % O2 Device: None (Room air)    Vitals:    10/26/21 1441   Weight: 73.4 kg (161 lb 13.1 oz)     Vital Signs with Ranges  Temp:  [97.9  F (36.6  C)-98.5  F (36.9  C)] 98.3  F (36.8  C)  Pulse:  [] 90  Resp:  [17-18] 18  BP: ()/(48-86) 138/86  SpO2:  [95 %-97 %] 96 %  I/O last 3  completed shifts:  In: 363 [P.O.:360; I.V.:3]  Out: -     Constitutional: Well-appearing, lying in bed.  Alert, oriented to person, pale  Respiratory:  Lungs CTAB.  No crackles, wheezes, or rhonchi, no labored breathing.  Cardiovascular:  Heart RRR, no murmur, no edema.  GI:  Abdomen soft,  + BS not overtly tender to palpation  Skin/Integumen:  Warm, dry, non-diaphoretic.  MSK: CMS x4 intact.    Medications       lisinopril  20 mg Oral Daily     piperacillin-tazobactam  3.375 g Intravenous Q6H     sodium chloride (PF)  3 mL Intracatheter Q8H     trimethoprim  100 mg Oral Daily       Data   Recent Labs   Lab 10/30/21  0713 10/29/21  1949 10/29/21  1234 10/29/21  1128 10/29/21  0839 10/29/21  0553 10/29/21  0553 10/28/21  1721 10/28/21  1242 10/28/21  0625 10/28/21  0625 10/26/21  2309 10/26/21  1733 10/26/21  1059 10/26/21  1057 10/26/21  1057   WBC 10.3  --   --   --   --   --  13.6*  --   --   --  13.4*   < >  --   --   --  8.1   HGB 7.4* 7.7* 8.2*  --   --    < > 8.8*   < >  --   --  8.9*   < > 10.9*  --    < > 11.6*   MCV 89  --   --   --   --   --  89  --   --   --  88   < >  --   --   --  89     --   --   --   --   --  343  --   --   --  352   < >  --   --   --  315   INR  --   --   --   --   --   --   --   --   --   --   --   --  1.09 1.14  --   --      --   --   --   --   --  135  --   --   --  132*   < >  --   --   --  137   POTASSIUM 3.9  --   --   --   --   --  4.1  --  3.7   < > 3.2*   < >  --   --   --  3.9   CHLORIDE 107  --   --   --   --   --  108  --   --   --  101   < >  --   --   --  106   CO2 23  --   --   --   --   --  21  --   --   --  20   < >  --   --   --  25   BUN 9  --   --   --   --   --  8  --   --   --  12   < >  --   --   --  11   CR 0.79  --   --   --   --   --  0.86  --   --   --  0.84   < >  --   --   --  0.76   ANIONGAP 6  --   --   --   --   --  6  --   --   --  11   < >  --   --   --  6   VLADIMIR 7.6*  --   --   --   --   --  8.1*  --   --   --  8.4*   < >  --   --   --   8.6   GLC 88  --   --  222* 89  --  100*  --   --    < > 114*   < >  --   --   --  108*   ALBUMIN  --   --   --   --   --   --   --   --   --   --   --   --   --   --   --  2.7*   PROTTOTAL  --   --   --   --   --   --   --   --   --   --   --   --   --   --   --  6.4*   BILITOTAL  --   --   --   --   --   --   --   --   --   --   --   --   --   --   --  0.4   ALKPHOS  --   --   --   --   --   --   --   --   --   --   --   --   --   --   --  119   ALT  --   --   --   --   --   --   --   --   --   --   --   --   --   --   --  32   AST  --   --   --   --   --   --   --   --   --   --   --   --   --   --   --  24    < > = values in this interval not displayed.       No results found for this or any previous visit (from the past 24 hour(s)).

## 2021-10-30 NOTE — PLAN OF CARE
Pt is A&OX4,VSS on RA, denies pain, up AX1GB/W,cleared by GArnoldI for discharge, still on I.v zosyn Q6hrs, last HB 8.2, Plan is discharge tomorrow, per PT, can discharge home with walker.

## 2021-10-30 NOTE — PROVIDER NOTIFICATION
MD Notification    Notified Person: MD    Notified Person Name: Dr. Masters's office    Notification Date/Time: 10/30/21  8:42 AM    Notification Interaction: paged     Purpose of Notification: pt experiencing new bleeding. hgb 7.4    Orders Received:make NPO, do 2 tap water enemas ASAP, plan for flex sig around 11.     Comments:

## 2021-10-30 NOTE — PLAN OF CARE
A&Ox4. Up A1 w/gb. Clear liquids diet. VSS on RA. Pt began having more rectal bleeding this AM. Enema done with produced more blood and clots. Pt went to EGD for cauterization and clips. Placed on tele after having tachycardic episode during procedure and after returning to the room. Dry heaving x1 after procedure but resoled after zofran given in PACU started working. Now receiving blood transfusion for hgb of 7.4. Recheck 1 hour after blood ends. Continent.

## 2021-10-30 NOTE — PLAN OF CARE
A/O x4. VSS on RA. C/o left knee pain, Tylenol given with relief. IV Zosyn. Will mechanical soft diet. Up with Ax1/GB/W. Able to void adequately, no bloody stools. Continue to monitor.

## 2021-10-30 NOTE — PROVIDER NOTIFICATION
"Web paged Dr. Garcia re: \"call from blood bank. Pt type and screen  at midnight and does not have consent. Endo is calling for pt\"     Dr. Garcia arrived and got consent and put in new type and screen order.     Other- Pt will do down to endo after lab finishes type and screen     "

## 2021-10-31 LAB
ANION GAP SERPL CALCULATED.3IONS-SCNC: 6 MMOL/L (ref 3–14)
BUN SERPL-MCNC: 7 MG/DL (ref 7–30)
CALCIUM SERPL-MCNC: 7.8 MG/DL (ref 8.5–10.1)
CHLORIDE BLD-SCNC: 108 MMOL/L (ref 94–109)
CO2 SERPL-SCNC: 24 MMOL/L (ref 20–32)
CREAT SERPL-MCNC: 0.77 MG/DL (ref 0.52–1.04)
GFR SERPL CREATININE-BSD FRML MDRD: 70 ML/MIN/1.73M2
GLUCOSE BLD-MCNC: 82 MG/DL (ref 70–99)
HGB BLD-MCNC: 8.6 G/DL (ref 11.7–15.7)
HGB BLD-MCNC: 9 G/DL (ref 11.7–15.7)
POTASSIUM BLD-SCNC: 4.1 MMOL/L (ref 3.4–5.3)
SODIUM SERPL-SCNC: 138 MMOL/L (ref 133–144)

## 2021-10-31 PROCEDURE — 36415 COLL VENOUS BLD VENIPUNCTURE: CPT | Performed by: STUDENT IN AN ORGANIZED HEALTH CARE EDUCATION/TRAINING PROGRAM

## 2021-10-31 PROCEDURE — 250N000013 HC RX MED GY IP 250 OP 250 PS 637: Performed by: STUDENT IN AN ORGANIZED HEALTH CARE EDUCATION/TRAINING PROGRAM

## 2021-10-31 PROCEDURE — 99232 SBSQ HOSP IP/OBS MODERATE 35: CPT | Performed by: STUDENT IN AN ORGANIZED HEALTH CARE EDUCATION/TRAINING PROGRAM

## 2021-10-31 PROCEDURE — 250N000011 HC RX IP 250 OP 636: Performed by: INTERNAL MEDICINE

## 2021-10-31 PROCEDURE — 250N000013 HC RX MED GY IP 250 OP 250 PS 637: Performed by: HOSPITALIST

## 2021-10-31 PROCEDURE — 85018 HEMOGLOBIN: CPT | Performed by: HOSPITALIST

## 2021-10-31 PROCEDURE — 80048 BASIC METABOLIC PNL TOTAL CA: CPT | Performed by: STUDENT IN AN ORGANIZED HEALTH CARE EDUCATION/TRAINING PROGRAM

## 2021-10-31 PROCEDURE — 36415 COLL VENOUS BLD VENIPUNCTURE: CPT | Performed by: HOSPITALIST

## 2021-10-31 PROCEDURE — 250N000013 HC RX MED GY IP 250 OP 250 PS 637: Performed by: INTERNAL MEDICINE

## 2021-10-31 PROCEDURE — 120N000004 HC R&B MS OVERFLOW

## 2021-10-31 RX ORDER — HYDROCHLOROTHIAZIDE 12.5 MG/1
12.5 CAPSULE ORAL DAILY
Status: DISCONTINUED | OUTPATIENT
Start: 2021-10-31 | End: 2021-11-01

## 2021-10-31 RX ORDER — POLYETHYLENE GLYCOL 3350 17 G/17G
17 POWDER, FOR SOLUTION ORAL DAILY
Status: DISCONTINUED | OUTPATIENT
Start: 2021-10-31 | End: 2021-11-04 | Stop reason: HOSPADM

## 2021-10-31 RX ADMIN — TRIMETHOPRIM 100 MG: 100 TABLET ORAL at 08:33

## 2021-10-31 RX ADMIN — PIPERACILLIN SODIUM AND TAZOBACTAM SODIUM 3.38 G: 3; .375 INJECTION, POWDER, LYOPHILIZED, FOR SOLUTION INTRAVENOUS at 22:37

## 2021-10-31 RX ADMIN — HYDROCHLOROTHIAZIDE 12.5 MG: 12.5 CAPSULE ORAL at 10:38

## 2021-10-31 RX ADMIN — POLYETHYLENE GLYCOL 3350 17 G: 17 POWDER, FOR SOLUTION ORAL at 17:52

## 2021-10-31 RX ADMIN — PIPERACILLIN SODIUM AND TAZOBACTAM SODIUM 3.38 G: 3; .375 INJECTION, POWDER, LYOPHILIZED, FOR SOLUTION INTRAVENOUS at 17:52

## 2021-10-31 RX ADMIN — PIPERACILLIN SODIUM AND TAZOBACTAM SODIUM 3.38 G: 3; .375 INJECTION, POWDER, LYOPHILIZED, FOR SOLUTION INTRAVENOUS at 10:38

## 2021-10-31 RX ADMIN — LISINOPRIL 20 MG: 20 TABLET ORAL at 08:33

## 2021-10-31 RX ADMIN — PIPERACILLIN SODIUM AND TAZOBACTAM SODIUM 3.38 G: 3; .375 INJECTION, POWDER, LYOPHILIZED, FOR SOLUTION INTRAVENOUS at 04:52

## 2021-10-31 ASSESSMENT — ACTIVITIES OF DAILY LIVING (ADL)
ADLS_ACUITY_SCORE: 15
ADLS_ACUITY_SCORE: 15
ADLS_ACUITY_SCORE: 13
ADLS_ACUITY_SCORE: 15
ADLS_ACUITY_SCORE: 13
ADLS_ACUITY_SCORE: 15
ADLS_ACUITY_SCORE: 13
ADLS_ACUITY_SCORE: 15
ADLS_ACUITY_SCORE: 15
ADLS_ACUITY_SCORE: 13
ADLS_ACUITY_SCORE: 15
ADLS_ACUITY_SCORE: 13
ADLS_ACUITY_SCORE: 15
ADLS_ACUITY_SCORE: 13
ADLS_ACUITY_SCORE: 15

## 2021-10-31 NOTE — PLAN OF CARE
Pt A&OX4. VSS on RA. Denies pain, N/V, diziness. Tolerating clear liquid diet. Assist of 1 GB/walker. Tele- SR w/ PVC. Passing gas. Received the remaining blood transfusion completes. Small tiny clot noticed after the bathroom use this shift. Hgb recheck at midnight. Hgb at 8.7 this shift. PT signed off. PIV  SL. Discharge pending. Continue to monitor.

## 2021-10-31 NOTE — PROGRESS NOTES
[facility]    Hospitalist Progress Note    Date of Service (when I saw the patient): 10/31/2021    Assessment & Plan   Floridalma Cunningham is a 86 year old female with a history of hypertension, hyperlipidemia, and recurrent UTIs who presented to the ER following an episode of abdominal followed by a large bloody bowel movement. After evaluation in the ER, the hospitalist service was contacted to bring her into the hospital for further evaluation.     Abdominal pain with hematochezia, due to diverticular bleeding s/p colonoscopy with clips placed  Diverticulitis with acute diverticular bleed  Acute anemia due to blood loss   On the morning of admission she developed lower abdominal pain and got up to use the bathroom.  She had a large bloody bowel movement on her way into the bathroom.  Felt better afterwards and then did not have any further bloody bowel movements.  Takes Aleve 2 or 3 days/week. Recently started taking turmeric.  Hemoglobin stable at 11.6 in the ER down from 13.  Vitals were stable.  While being monitored in the hospital, patient once again started having repeated and frequent bloody stools.  Colonoscopy 10/28 shows severe diverticulosis in the descending colon, with evidence of impacted diverticulum and purulent discharge indicative of diverticulitis.  There is active bleeding coming from the diverticular opening.  There is recent bleeding from a couple diverticuli that were injected and treated with bipolar cautery.  Clips were placed.  -Taken back to yesterday for sigmoidoscopy which found new bleeding, new clip placed with cautery.    > Will follow in hospital today for any signs of bleeding and diet tolerance  > Hb stable in ~8 range since transfusion yesterday, Monitor Hb tomorrow AM or sooner if bleeding reoccurs  > Continue IV zosyn for now and transition to orals on discharge   > Avoid NSAIDs indefinitely and tumeric as this can thin the blood and increase risk of  bleeding.    Hypokalemia  Hyponatremia, mild  sodium dropped to 132 from 136, potassium down to 3.2.  Likely GI losses from bowel prep and clear liquid diet.      > K replacement protocol ordered    Hypertension  Blood pressure has been variable, overall reasonably controlled    > Continue PTA lisinopril with hold parameters.  > restart hydrochlorothiazide today at 1/2 dose for increasing BP     Hyperlipidemia  Hold PTA atorvastatin      Recurrent UTIs  Currently asymptomatic.  Continue PTA prophylactic trimethoprim.  (Noted patient is on IV Zosyn started by GI)     COVID-19 PCR negative  Routine admission COVID-19 PCR testing was negative on 10/26/2021.  She received the Moderna COVID-19 vaccine on 1/17/2021 and 2/14/2021.        Diet:  CLD  DVT Prophylaxis: PCDs in light of GI bleed  Jackson Catheter: Not present  Central Lines: None  Code Status: No CPR- Do NOT Intubate      Disposition: Possible tomorrow if she remains stable today    Nevaeh Oden    Interval History   Patient sleeping but wakes up for me. She denies major pain at the moment. No BM since yesterday and no bleeding she is aware of. Passing flatus during my assessment. No SOA. No chest pain. No edema. She is less pale than yesterday and does feel like she has more energy since her transfusion.    -Data reviewed today: I reviewed all new labs and imaging results over the last 24 hours.     Physical Exam   Temp: 98.1  F (36.7  C) Temp src: Oral BP: 121/49 Pulse: 86   Resp: 18 SpO2: 96 % O2 Device: None (Room air)    Vitals:    10/26/21 1441   Weight: 73.4 kg (161 lb 13.1 oz)     Vital Signs with Ranges  Temp:  [97.2  F (36.2  C)-98.3  F (36.8  C)] 98.1  F (36.7  C)  Pulse:  [] 86  Resp:  [10-28] 18  BP: (105-162)/() 121/49  SpO2:  [96 %-100 %] 96 %  I/O last 3 completed shifts:  In: 350 [P.O.:350]  Out: -     Constitutional: Well-appearing, lying in bed.  Alert, oriented to person, pale  Respiratory:  Lungs CTAB.  No crackles, wheezes,  or rhonchi, no labored breathing.  Cardiovascular:  Heart RRR, no murmur, no edema.  GI:  Abdomen soft,  + BS not overtly tender to palpation  Skin/Integumen:  Warm, dry, non-diaphoretic.  MSK: CMS x4 intact.    Medications       lisinopril  20 mg Oral Daily     piperacillin-tazobactam  3.375 g Intravenous Q6H     sodium chloride (PF)  3 mL Intracatheter Q8H     trimethoprim  100 mg Oral Daily       Data   Recent Labs   Lab 10/31/21  0637 10/31/21  0020 10/30/21  1745 10/30/21  0713 10/30/21  0713 10/29/21  1234 10/29/21  1128 10/29/21  0839 10/29/21  0553 10/28/21  1242 10/28/21  0625 10/26/21  2309 10/26/21  1733 10/26/21  1059 10/26/21  1057 10/26/21  1057   WBC  --   --   --   --  10.3  --   --   --  13.6*  --  13.4*   < >  --   --   --  8.1   HGB 8.6* 9.0* 8.7*   < > 7.4*   < >  --   --  8.8*   < > 8.9*   < > 10.9*  --    < > 11.6*   MCV  --   --   --   --  89  --   --   --  89  --  88   < >  --   --   --  89   PLT  --   --   --   --  269  --   --   --  343  --  352   < >  --   --   --  315   INR  --   --   --   --   --   --   --   --   --   --   --   --  1.09 1.14  --   --      --   --   --  136  --   --   --  135  --  132*   < >  --   --   --  137   POTASSIUM 4.1  --   --   --  3.9  --   --   --  4.1   < > 3.2*   < >  --   --   --  3.9   CHLORIDE 108  --   --   --  107  --   --   --  108  --  101   < >  --   --   --  106   CO2 24  --   --   --  23  --   --   --  21  --  20   < >  --   --   --  25   BUN 7  --   --   --  9  --   --   --  8  --  12   < >  --   --   --  11   CR 0.77  --   --   --  0.79  --   --   --  0.86  --  0.84   < >  --   --   --  0.76   ANIONGAP 6  --   --   --  6  --   --   --  6  --  11   < >  --   --   --  6   VLADIMIR 7.8*  --   --   --  7.6*  --   --   --  8.1*  --  8.4*   < >  --   --   --  8.6   GLC 82  --   --   --  88  --  222*   < > 100*  --  114*   < >  --   --   --  108*   ALBUMIN  --   --   --   --   --   --   --   --   --   --   --   --   --   --   --  2.7*   PROTTOTAL  --    --   --   --   --   --   --   --   --   --   --   --   --   --   --  6.4*   BILITOTAL  --   --   --   --   --   --   --   --   --   --   --   --   --   --   --  0.4   ALKPHOS  --   --   --   --   --   --   --   --   --   --   --   --   --   --   --  119   ALT  --   --   --   --   --   --   --   --   --   --   --   --   --   --   --  32   AST  --   --   --   --   --   --   --   --   --   --   --   --   --   --   --  24    < > = values in this interval not displayed.       No results found for this or any previous visit (from the past 24 hour(s)).

## 2021-10-31 NOTE — PROGRESS NOTES
[facility]  Gastroenterology Progress Note     Floridalma Cunningham MRN# [id number]   YOB: 1935 Age: 86 year old          Assessment and Plan:     Hematochezia  Patient is clinically doing well.  Patient is hemodynamically stable.  Patient was doing well last 2 days patient was diagnosed with a diverticular bleed in the sigmoid descending colon area which was treated with cauterization and Endo Clip placements however this morning patient had another bout of bloody stools.  Giving patient finding plan is to proceed with repeat colonoscopy patient was given two tapwater enemas.         Hematochezia      Interval History:   doing well; no cp, sob, n/v/d, or abd pain.              Review of Systems:   C: NEGATIVE for fever, chills, change in weight  E/M: NEGATIVE for ear, mouth and throat problems  R: NEGATIVE for significant cough or SOB  CV: NEGATIVE for chest pain, palpitations or peripheral edema             Medications:   I have reviewed this patient's current medications    lisinopril  20 mg Oral Daily     piperacillin-tazobactam  3.375 g Intravenous Q6H     sodium chloride (PF)  3 mL Intracatheter Q8H     trimethoprim  100 mg Oral Daily                  Physical Exam:   Vitals were reviewed  Vital Signs with Ranges  Temp:  [97.2  F (36.2  C)-98.3  F (36.8  C)] 97.2  F (36.2  C)  Pulse:  [] 85  Resp:  [10-28] 18  BP: (105-162)/() 109/66  SpO2:  [95 %-100 %] 97 %  No intake/output data recorded.  Constitutional: healthy, alert and no distress   Cardiovascular: negative, PMI normal. No lifts, heaves, or thrills. RRR. No murmurs, clicks gallops or rub  Respiratory: negative, Percussion normal. Good diaphragmatic excursion. Lungs clear  Head: Normocephalic. No masses, lesions, tenderness or abnormalities  Neck: Neck supple. No adenopathy. Thyroid symmetric, normal size,, Carotids without bruits.  Abdomen: Abdomen soft, non-tender. BS normal. No masses,  organomegaly  SKIN: no suspicious lesions or rashes           Data:   I reviewed the patient's new clinical lab test results.   Recent Labs   Lab Test 10/30/21  1745 10/30/21  0713 10/29/21  1949 10/29/21  1234 10/29/21  0553 10/28/21  1721 10/28/21  0625 10/26/21  2309 10/26/21  1733 10/26/21  1059 10/26/21  1057   WBC  --  10.3  --   --  13.6*  --  13.4*   < >  --   --   --    HGB 8.7* 7.4* 7.7*   < > 8.8*   < > 8.9*   < > 10.9*  --    < >   MCV  --  89  --   --  89  --  88   < >  --   --   --    PLT  --  269  --   --  343  --  352   < >  --   --   --    INR  --   --   --   --   --   --   --   --  1.09 1.14  --     < > = values in this interval not displayed.     Recent Labs   Lab Test 10/30/21  0713 10/29/21  0553 10/28/21  1242 10/28/21  0625 10/28/21  0625   POTASSIUM 3.9 4.1 3.7   < > 3.2*   CHLORIDE 107 108  --   --  101   CO2 23 21  --   --  20   BUN 9 8  --   --  12   ANIONGAP 6 6  --   --  11    < > = values in this interval not displayed.     Recent Labs   Lab Test 10/26/21  1057 10/21/21  1209 08/12/21  0702 02/16/21  1527 08/31/20  1344 07/07/20  1026   ALBUMIN 2.7* 3.1* 2.8*   < >  --   --    BILITOTAL 0.4 0.6 0.9   < >  --   --    ALT 32 20 25   < >  --   --    AST 24 18 15   < >  --   --    PROTEIN  --   --   --   --  Negative 30*    < > = values in this interval not displayed.       I reviewed the patient's new imaging results.    All laboratory data reviewed  All imaging studies reviewed by me.    Paul Masters MD,  10/30/2021  [facility]  Office : [phone]  Cell: [phone]

## 2021-10-31 NOTE — PLAN OF CARE
A/O x4. VSS on RA. Denies pain and N/V. Clear liquid diet. IV Zosyn. Up with SBA. Able to void adequately, no bloody stool. Hgb 9.0, 8.6. Tele NSR. Continue to monitor.

## 2021-10-31 NOTE — PLAN OF CARE
A&Ox4. SBA to JEREMY HIDALGO. No active bleeding noted. VSS on RA. Tolerating clear liuids. Will advance to soft diet for dinner. C/o mild abd discomfort but no pain.

## 2021-11-01 ENCOUNTER — APPOINTMENT (OUTPATIENT)
Dept: CT IMAGING | Facility: CLINIC | Age: 86
DRG: 357 | End: 2021-11-01
Attending: STUDENT IN AN ORGANIZED HEALTH CARE EDUCATION/TRAINING PROGRAM
Payer: COMMERCIAL

## 2021-11-01 LAB
BASOPHILS # BLD AUTO: 0.1 10E3/UL (ref 0–0.2)
BASOPHILS NFR BLD AUTO: 1 %
EOSINOPHIL # BLD AUTO: 0.4 10E3/UL (ref 0–0.7)
EOSINOPHIL NFR BLD AUTO: 5 %
ERYTHROCYTE [DISTWIDTH] IN BLOOD BY AUTOMATED COUNT: 15.6 % (ref 10–15)
HCT VFR BLD AUTO: 24.9 % (ref 35–47)
HGB BLD-MCNC: 7.9 G/DL (ref 11.7–15.7)
HGB BLD-MCNC: 8 G/DL (ref 11.7–15.7)
HGB BLD-MCNC: 8.5 G/DL (ref 11.7–15.7)
IMM GRANULOCYTES # BLD: 0 10E3/UL
IMM GRANULOCYTES NFR BLD: 1 %
LYMPHOCYTES # BLD AUTO: 1.9 10E3/UL (ref 0.8–5.3)
LYMPHOCYTES NFR BLD AUTO: 23 %
MCH RBC QN AUTO: 28.2 PG (ref 26.5–33)
MCHC RBC AUTO-ENTMCNC: 31.7 G/DL (ref 31.5–36.5)
MCV RBC AUTO: 89 FL (ref 78–100)
MONOCYTES # BLD AUTO: 0.7 10E3/UL (ref 0–1.3)
MONOCYTES NFR BLD AUTO: 8 %
NEUTROPHILS # BLD AUTO: 5.4 10E3/UL (ref 1.6–8.3)
NEUTROPHILS NFR BLD AUTO: 62 %
NRBC # BLD AUTO: 0 10E3/UL
NRBC BLD AUTO-RTO: 0 /100
PLATELET # BLD AUTO: 275 10E3/UL (ref 150–450)
RBC # BLD AUTO: 2.8 10E6/UL (ref 3.8–5.2)
WBC # BLD AUTO: 8.4 10E3/UL (ref 4–11)

## 2021-11-01 PROCEDURE — 85025 COMPLETE CBC W/AUTO DIFF WBC: CPT | Performed by: STUDENT IN AN ORGANIZED HEALTH CARE EDUCATION/TRAINING PROGRAM

## 2021-11-01 PROCEDURE — 250N000009 HC RX 250: Performed by: HOSPITALIST

## 2021-11-01 PROCEDURE — 250N000013 HC RX MED GY IP 250 OP 250 PS 637: Performed by: INTERNAL MEDICINE

## 2021-11-01 PROCEDURE — 99233 SBSQ HOSP IP/OBS HIGH 50: CPT | Performed by: STUDENT IN AN ORGANIZED HEALTH CARE EDUCATION/TRAINING PROGRAM

## 2021-11-01 PROCEDURE — 85018 HEMOGLOBIN: CPT | Performed by: STUDENT IN AN ORGANIZED HEALTH CARE EDUCATION/TRAINING PROGRAM

## 2021-11-01 PROCEDURE — 250N000013 HC RX MED GY IP 250 OP 250 PS 637: Performed by: HOSPITALIST

## 2021-11-01 PROCEDURE — 85018 HEMOGLOBIN: CPT | Performed by: PHYSICIAN ASSISTANT

## 2021-11-01 PROCEDURE — 250N000011 HC RX IP 250 OP 636: Performed by: HOSPITALIST

## 2021-11-01 PROCEDURE — 36415 COLL VENOUS BLD VENIPUNCTURE: CPT | Performed by: PHYSICIAN ASSISTANT

## 2021-11-01 PROCEDURE — 120N000004 HC R&B MS OVERFLOW

## 2021-11-01 PROCEDURE — 74174 CTA ABD&PLVS W/CONTRAST: CPT

## 2021-11-01 PROCEDURE — 250N000011 HC RX IP 250 OP 636: Performed by: INTERNAL MEDICINE

## 2021-11-01 PROCEDURE — 250N000013 HC RX MED GY IP 250 OP 250 PS 637: Performed by: STUDENT IN AN ORGANIZED HEALTH CARE EDUCATION/TRAINING PROGRAM

## 2021-11-01 PROCEDURE — 36415 COLL VENOUS BLD VENIPUNCTURE: CPT | Performed by: STUDENT IN AN ORGANIZED HEALTH CARE EDUCATION/TRAINING PROGRAM

## 2021-11-01 RX ORDER — IOPAMIDOL 755 MG/ML
80 INJECTION, SOLUTION INTRAVASCULAR ONCE
Status: COMPLETED | OUTPATIENT
Start: 2021-11-01 | End: 2021-11-01

## 2021-11-01 RX ADMIN — Medication 1 MG: at 00:13

## 2021-11-01 RX ADMIN — ACETAMINOPHEN 650 MG: 325 TABLET, FILM COATED ORAL at 07:04

## 2021-11-01 RX ADMIN — PIPERACILLIN SODIUM AND TAZOBACTAM SODIUM 3.38 G: 3; .375 INJECTION, POWDER, LYOPHILIZED, FOR SOLUTION INTRAVENOUS at 17:16

## 2021-11-01 RX ADMIN — POLYETHYLENE GLYCOL 3350 17 G: 17 POWDER, FOR SOLUTION ORAL at 08:17

## 2021-11-01 RX ADMIN — PIPERACILLIN SODIUM AND TAZOBACTAM SODIUM 3.38 G: 3; .375 INJECTION, POWDER, LYOPHILIZED, FOR SOLUTION INTRAVENOUS at 11:28

## 2021-11-01 RX ADMIN — IOPAMIDOL 80 ML: 755 INJECTION, SOLUTION INTRAVENOUS at 13:08

## 2021-11-01 RX ADMIN — ACETAMINOPHEN 650 MG: 325 TABLET, FILM COATED ORAL at 19:54

## 2021-11-01 RX ADMIN — HYDROCHLOROTHIAZIDE 12.5 MG: 12.5 CAPSULE ORAL at 08:15

## 2021-11-01 RX ADMIN — ACETAMINOPHEN 650 MG: 325 TABLET, FILM COATED ORAL at 00:14

## 2021-11-01 RX ADMIN — PIPERACILLIN SODIUM AND TAZOBACTAM SODIUM 3.38 G: 3; .375 INJECTION, POWDER, LYOPHILIZED, FOR SOLUTION INTRAVENOUS at 05:14

## 2021-11-01 RX ADMIN — TRIMETHOPRIM 100 MG: 100 TABLET ORAL at 08:15

## 2021-11-01 RX ADMIN — SODIUM CHLORIDE 80 ML: 900 INJECTION INTRAVENOUS at 13:08

## 2021-11-01 RX ADMIN — PIPERACILLIN SODIUM AND TAZOBACTAM SODIUM 3.38 G: 3; .375 INJECTION, POWDER, LYOPHILIZED, FOR SOLUTION INTRAVENOUS at 23:01

## 2021-11-01 ASSESSMENT — ACTIVITIES OF DAILY LIVING (ADL)
ADLS_ACUITY_SCORE: 13
ADLS_ACUITY_SCORE: 11
ADLS_ACUITY_SCORE: 13
ADLS_ACUITY_SCORE: 11
ADLS_ACUITY_SCORE: 13
ADLS_ACUITY_SCORE: 11
ADLS_ACUITY_SCORE: 11
ADLS_ACUITY_SCORE: 13
ADLS_ACUITY_SCORE: 13
ADLS_ACUITY_SCORE: 11
ADLS_ACUITY_SCORE: 13
ADLS_ACUITY_SCORE: 11
ADLS_ACUITY_SCORE: 13
ADLS_ACUITY_SCORE: 13
ADLS_ACUITY_SCORE: 11
ADLS_ACUITY_SCORE: 13
ADLS_ACUITY_SCORE: 13
ADLS_ACUITY_SCORE: 11
ADLS_ACUITY_SCORE: 13

## 2021-11-01 NOTE — PROGRESS NOTES
Interventional Radiology - Pre-Procedure Note:  11/1/2021    Procedure Requested: Embolization for a recurrent lower GI bleed  Requested by: Dr Nevaeh Oden    Brief HPI:Floridalma Cunningham is a 86 year old female with a history of hypertension, hyperlipidemia, and recurrent UTIs who presented to the ER following an episode of abdominal followed by a large bloody bowel movement. She uses Aleve 2-3 times a week.     She had episodes of 5 bloody stools by 10/28 with a hemoglobin drop from 10.5 to 8.9. She had a colonoscopy and a diverticular bleed in the sigmoid colon area was cauterized and clipped.  She had a repeat flex sig on 10/30 after another large blood stool and was transfused also. Clot in the rectum noted, along with a recent bleeding in a diverticulum which were injected with epinephrine. She had been stable with no signs or symptoms of bleeding until she soaked her pad with blood again today and an IR consult was placed.      Interventional History: Feeling good other than I'm passing blood. I'm glad I didn't go home today.      IMAGING:    No recent imaging. CTA ordered.     NPO: Yes  ANTICOAGULANTS: None  ANTIBIOTICS: Yes     ALLERGIES  Allergies   Allergen Reactions     Sulfa Drugs      LABS:  ROUTINE ICU LABS (Last four results)  CMPRecent Labs   Lab 10/31/21  0637 10/30/21  0713 10/29/21  1128 10/29/21  0839 10/29/21  0553 10/28/21  1242 10/28/21  0625 10/28/21  0625 10/27/21  0641 10/26/21  1057    136  --   --  135  --   --  132*   < > 137   POTASSIUM 4.1 3.9  --   --  4.1 3.7   < > 3.2*   < > 3.9   CHLORIDE 108 107  --   --  108  --   --  101   < > 106   CO2 24 23  --   --  21  --   --  20   < > 25   ANIONGAP 6 6  --   --  6  --   --  11   < > 6   GLC 82 88 222* 89 100*  --    < > 114*   < > 108*   BUN 7 9  --   --  8  --   --  12   < > 11   CR 0.77 0.79  --   --  0.86  --   --  0.84   < > 0.76   GFRESTIMATED 70 68  --   --  61  --   --  63   < > 71   VLADIMIR 7.8* 7.6*  --   --  8.1*  --   --   8.4*   < > 8.6   PROTTOTAL  --   --   --   --   --   --   --   --   --  6.4*   ALBUMIN  --   --   --   --   --   --   --   --   --  2.7*   BILITOTAL  --   --   --   --   --   --   --   --   --  0.4   ALKPHOS  --   --   --   --   --   --   --   --   --  119   AST  --   --   --   --   --   --   --   --   --  24   ALT  --   --   --   --   --   --   --   --   --  32    < > = values in this interval not displayed.     CBC  Recent Labs   Lab 11/01/21  1111 11/01/21  0733 10/31/21  0637 10/31/21  0020 10/30/21  1745 10/30/21  0713 10/29/21  1234 10/29/21  0553 10/28/21  1721 10/28/21  0625   WBC  --  8.4  --   --   --  10.3  --  13.6*  --  13.4*   RBC  --  2.80*  --   --   --  2.55*  --  3.09*  --  3.20*   HGB 8.0* 7.9* 8.6* 9.0*   < > 7.4*   < > 8.8*   < > 8.9*   HCT  --  24.9*  --   --   --  22.6*  --  27.5*  --  28.1*   MCV  --  89  --   --   --  89  --  89  --  88   MCH  --  28.2  --   --   --  29.0  --  28.5  --  27.8   MCHC  --  31.7  --   --   --  32.7  --  32.0  --  31.7   RDW  --  15.6*  --   --   --  15.9*  --  15.9*  --  15.6*   PLT  --  275  --   --   --  269  --  343  --  352    < > = values in this interval not displayed.     INR  Recent Labs   Lab 10/26/21  1733 10/26/21  1059   INR 1.09 1.14     Arterial Blood GasNo lab results found in last 7 days.    EXAM:  Temp:  [97.7  F (36.5  C)-98.1  F (36.7  C)] 98.1  F (36.7  C)  Pulse:  [66-87] 82  Resp:  [16-18] 16  BP: ()/(57-94) 99/57  SpO2:  [94 %-99 %] 94 %     General/Neuro:  Alert, oriented, bright, pleasant woman In no acute distress. Moves all extremities equally.  Resp:  Lungs clear to auscultation bilaterally.  Cardio:  S1S2 and reg, without murmur, clicks or rubs  Abdomen:  Soft, non-distended, non-tender, positive bowel sounds.  Vascular: +1/4 bilateral dorsalis pedis pulses, No edema   MSK:  No gross motor weakness.  Sensation intact.  Proprioception intact.    Pre-Sedation Code Status Assessment:  Code Status: DNR / DNI intra procedure, per  chart review.      History and Physical Reviewed: H&P documented within 30 days.  I have personally reviewed the patient's medical history and have updated the medical record as necessary.    ASSESSMENT/PLAN: Floridalma Cunningham is an 86 year old woman with a recent history significant for a GI bleed felt to be due to a bleeding diverticuli and treated with colonoscopy, clipping, cauterizing. The last episode of bleeding was on 10/30 prior to this morning when she soaked her jonathan pad with blood and clots. IR consulted for possible angiogram with embolization.     CTA to be done first to evaluate for an active bleed. If + will proceed to angiogram with embolization. If no active bleeding noted will evaluate further with Floridalma' progress. She has a clip in place with IR can see on XR and can help as a landmark for possible embolization if prophylactic embolization is considered.     Procedure, risks/benefits, details, alternatives, and sedation reviewed with patient and youngest daughter who verbalized understanding. All questions answered. OK to proceed with above radiology procedure if it is recommended.     Discussed with Hospitalist Dr Oden today.     Thanks [facility] Interventional Radiology CNP ([phone]) (phone [phone])

## 2021-11-01 NOTE — PLAN OF CARE
A&OX4, VSS on RA. Denies pain, n&V. Tele was SR with PVCs.No BM or other signs of active bleeding this shift. Up AX1 with GB and walker. Tolerating mechanical soft diet. PIV Sled. Continue to monitor.

## 2021-11-01 NOTE — DISCHARGE INSTRUCTIONS
Your doctor has ordered home care to help you after your hospital stay.  They will contact you regarding your 1st visit.  The service will be provided by [facility].  Call [phone] if you do not hear from them in 24-48 hours.     [facility], follow-up in 1-2 weeks. Call for appointment: [phone]

## 2021-11-01 NOTE — CONSULTS
Care Management Initial Consult    General Information  Assessment completed with: Patient,    Type of CM/SW Visit: Offer D/C Planning    Primary Care Provider verified and updated as needed: Yes   Readmission within the last 30 days: no previous admission in last 30 days      Reason for Consult: discharge planning  Advance Care Planning:            Communication Assessment  Patient's communication style: spoken language (English or Bilingual)    Hearing Difficulty or Deaf: no   Wear Glasses or Blind: yes    Cognitive  Cognitive/Neuro/Behavioral: WDL                      Living Environment:   People in home: alone     Current living Arrangements: apartment, other (see comments)  [facility]   Able to return to prior arrangements: yes       Family/Social Support:  Care provided by: self  Provides care for:    Marital Status:              Description of Support System:           Current Resources:   Patient receiving home care services: No     Community Resources: None  Equipment currently used at home: walker, rolling  Supplies currently used at home:      Employment/Financial:  Employment Status: retired        Financial Concerns: No concerns identified           Lifestyle & Psychosocial Needs:  Social Determinants of Health     Tobacco Use: Medium Risk     Smoking Tobacco Use: Former Smoker     Smokeless Tobacco Use: Never Used   Alcohol Use:      Frequency of Alcohol Consumption:      Average Number of Drinks:      Frequency of Binge Drinking:    Financial Resource Strain:      Difficulty of Paying Living Expenses:    Food Insecurity:      Worried About Running Out of Food in the Last Year:      Ran Out of Food in the Last Year:    Transportation Needs:      Lack of Transportation (Medical):      Lack of Transportation (Non-Medical):    Physical Activity:      Days of Exercise per Week:      Minutes of Exercise per Session:    Stress:      Feeling of Stress :    Social Connections:       Frequency of Communication with Friends and Family:      Frequency of Social Gatherings with Friends and Family:      Attends [employer] Services:      Active Member of Clubs or Organizations:      Attends Club or Organization Meetings:      Marital Status:    Intimate Partner Violence:      Fear of Current or Ex-Partner:      Emotionally Abused:      Physically Abused:      Sexually Abused:    Depression: Not at risk     PHQ-2 Score: 0   Housing Stability:      Unable to Pay for Housing in the Last Year:      Number of Places Lived in the Last Year:      Unstable Housing in the Last Year:        Functional Status:  Prior to admission patient needed assistance:              Mental Health Status:          Chemical Dependency Status:                Values/Beliefs:  Spiritual, Cultural Beliefs, [employer] Practices, Values that affect care: no               Additional Information:  Met with patient to discuss role in discharge planning.  Pt lives indep at [facility], It is attached to [facility] but she receives no services currently. Gets 2 meals/week and mostly eats microwave meals or buys from cafe on site. Pt does not drive. Receives rides from family, friends, or facility bus.  Reviewed recommendation for [facility].  Pt in agreement with this plan.  Voices she previously had [facility] and would want them again.  Pt does not feel she needs Home RN.     Pt/family was provided with the Medicare Compare list for Home Care.  Discussed associated Medicare star ratings to assist with choice for referrals/discharge planning Yes    Education was given to pt/family that star ratings are updated/maintained by Medicare and can be reviewed by visiting www.medicare.gov Yes    CC called [facility] ( [phone] and they can accept referral.  Initial intake faxed to [phone]    At discharge MD to write Order for Home PT and F2F and CC will fax to above.  Home PT info added to AVS.     Pt wishes to make own  Follow-up appointments.   Pt has a ride at discharge today  Bedside RN to review AVS.     Nishi Bartholomew RN

## 2021-11-01 NOTE — PLAN OF CARE
A/O x4. VSS on RA. Denies pain and N/V. Mechanical soft diet. IV Zosyn. Up with SBA. No bloody stool. Prn Tylenol given for left foot/ankle pain. Hgb 8.6. Tele NSR w/ PVCs. . PIV SL. Continue to monitor.

## 2021-11-01 NOTE — PROGRESS NOTES
[facility]    Hospitalist Progress Note    Date of Service (when I saw the patient): 11/01/2021    Assessment & Plan   Floridalma Cunningham is a 86 year old female with a history of hypertension, hyperlipidemia, and recurrent UTIs who presented to the ER following an episode of abdominal followed by a large bloody bowel movement. After evaluation in the ER, the hospitalist service was contacted to bring her into the hospital for further evaluation.     Abdominal pain with hematochezia, due to diverticular bleeding s/p colonoscopy with clips placed  Diverticulitis with acute diverticular bleed  Acute anemia due to blood loss   On the morning of admission she developed lower abdominal pain and got up to use the bathroom.  She had a large bloody bowel movement on her way into the bathroom.  Felt better afterwards and then did not have any further bloody bowel movements.  Takes Aleve 2 or 3 days/week. Recently started taking turmeric.  Hemoglobin stable at 11.6 in the ER down from 13.  Vitals were stable.  While being monitored in the hospital, patient once again started having repeated and frequent bloody stools.  Colonoscopy 10/28 shows severe diverticulosis in the descending colon, with evidence of impacted diverticulum and purulent discharge indicative of diverticulitis.  There is active bleeding coming from the diverticular opening.  There is recent bleeding from a couple diverticuli that were injected and treated with bipolar cautery.  Clips were placed.  -Taken back to 10/30 for sigmoidoscopy which found new bleeding, new clip placed with cautery.    > No bleeding overnight, however acute bleed again this morning. Blood soaked pad with BRB into toilet, same presentation as Alphonse 10/30.  > I updated GI and then I consulted and called IR. They recommended CT angio and will follow results, may need to proceed with procedure today however if unable to visualize bleeding will need to monitor.   >  Repeat Hb stable at 8 from 7.9 in AM, will trend q8h  > Continue IV zosyn for now and transition to orals on discharge   > Avoid NSAIDs indefinitely and tumeric as this can thin the blood and increase risk of bleeding.  > close monitoring of vitals today and for recurrent bleeding    Hypokalemia  Hyponatremia, mild  sodium dropped to 132 from 136, potassium down to 3.2.  Likely GI losses from bowel prep and clear liquid diet.      > K replacement protocol ordered    Hypertension  Blood pressure has been variable, overall reasonably controlled    > Continue PTA lisinopril with hold parameters.  > restarted hydrochlorothiazide yesterday for increasing BP however will hold now with ongoing bleeding.     Hyperlipidemia  Hold PTA atorvastatin      Recurrent UTIs  Currently asymptomatic.  Continue PTA prophylactic trimethoprim.  (Noted patient is on IV Zosyn started by GI)     COVID-19 PCR negative  Routine admission COVID-19 PCR testing was negative on 10/26/2021.  She received the Moderna COVID-19 vaccine on 1/17/2021 and 2/14/2021.        Diet:  CLD  DVT Prophylaxis: PCDs in light of GI bleed  Jackson Catheter: Not present  Central Lines: None  Code Status: No CPR- Do NOT Intubate      [facility]    Interval History   Patient feeling well this morning. She continues to complain of R ankle pain that waxes and wanes. It doesn't change with tylenol however she is aware of need to stop NSAIDs. No dizziness when walking to bathroom this morning. No BM yet. She is hungry. No cough.    Later in morning after assessment paged by nursing that she developed acute lower GI bleed.    -Data reviewed today: I reviewed all new labs and imaging results over the last 24 hours.     Physical Exam   Temp: 98.1  F (36.7  C) Temp src: Oral BP: 99/57 Pulse: 82   Resp: 16 SpO2: 94 % O2 Device: None (Room air)    Vitals:    10/26/21 1441   Weight: 73.4 kg (161 lb 13.1 oz)     Vital Signs with Ranges  Temp:  [97.7  F  (36.5  C)-98.1  F (36.7  C)] 98.1  F (36.7  C)  Pulse:  [66-87] 82  Resp:  [16-18] 16  BP: ()/(57-94) 99/57  SpO2:  [94 %-99 %] 94 %  I/O last 3 completed shifts:  In: 240 [P.O.:240]  Out: -     Constitutional: Well-appearing, lying in bed.  Alert, oriented to person, pale  Respiratory:  Lungs CTAB.  No crackles, wheezes, or rhonchi, no labored breathing.  Cardiovascular:  Heart RRR, no murmur, no edema.  GI:  Abdomen soft,  + BS not overtly tender to palpation  Skin/Integumen:  Warm, dry, non-diaphoretic.  MSK: CMS x4 intact.    Medications       hydrochlorothiazide  12.5 mg Oral Daily     lisinopril  20 mg Oral Daily     piperacillin-tazobactam  3.375 g Intravenous Q6H     polyethylene glycol  17 g Oral Daily     sodium chloride (PF)  3 mL Intracatheter Q8H     trimethoprim  100 mg Oral Daily       Data   Recent Labs   Lab 11/01/21  1111 11/01/21  0733 10/31/21  0637 10/30/21  1745 10/30/21  0713 10/29/21  1234 10/29/21  1128 10/29/21  0839 10/29/21  0553 10/26/21  2309 10/26/21  1733 10/26/21  1059 10/26/21  1057 10/26/21  1057   WBC  --  8.4  --   --  10.3  --   --   --  13.6*   < >  --   --   --  8.1   HGB 8.0* 7.9* 8.6*   < > 7.4*   < >  --   --  8.8*   < > 10.9*  --    < > 11.6*   MCV  --  89  --   --  89  --   --   --  89   < >  --   --   --  89   PLT  --  275  --   --  269  --   --   --  343   < >  --   --   --  315   INR  --   --   --   --   --   --   --   --   --   --  1.09 1.14  --   --    NA  --   --  138  --  136  --   --   --  135   < >  --   --   --  137   POTASSIUM  --   --  4.1  --  3.9  --   --   --  4.1   < >  --   --   --  3.9   CHLORIDE  --   --  108  --  107  --   --   --  108   < >  --   --   --  106   CO2  --   --  24  --  23  --   --   --  21   < >  --   --   --  25   BUN  --   --  7  --  9  --   --   --  8   < >  --   --   --  11   CR  --   --  0.77  --  0.79  --   --   --  0.86   < >  --   --   --  0.76   ANIONGAP  --   --  6  --  6  --   --   --  6   < >  --   --   --  6   VLADIMIR  --    --  7.8*  --  7.6*  --   --   --  8.1*   < >  --   --   --  8.6   GLC  --   --  82  --  88  --  222*   < > 100*   < >  --   --   --  108*   ALBUMIN  --   --   --   --   --   --   --   --   --   --   --   --   --  2.7*   PROTTOTAL  --   --   --   --   --   --   --   --   --   --   --   --   --  6.4*   BILITOTAL  --   --   --   --   --   --   --   --   --   --   --   --   --  0.4   ALKPHOS  --   --   --   --   --   --   --   --   --   --   --   --   --  119   ALT  --   --   --   --   --   --   --   --   --   --   --   --   --  32   AST  --   --   --   --   --   --   --   --   --   --   --   --   --  24    < > = values in this interval not displayed.       Recent Results (from the past 24 hour(s))   CTA Abdomen Pelvis with Contrast    Narrative    EXAM: CTA ABDOMEN PELVIS WITH CONTRAST  DATE/TIME: 11/1/2021 1:49 PM    INDICATION: GI bleed  COMPARISON: None.    TECHNIQUE: Helical acquisition through the abdomen and pelvis was  performed during the noncontrast, arterial, and delayed phases phase  of contrast enhancement. 2D and 3D reconstructions performed by the CT  technologist. Dose reduction techniques were used.  CONTRAST: 80 ml Isovue 370 from a single use vial    FINDINGS:   ARTERIAL FINDINGS: The abdominal aorta is patent with moderate  calcified atherosclerotic disease. The visceral arteries all appear  patent without significant focal vascular abnormality. No active  bleeding seen. No stenosis, aneurysm, or dissection.    NONVASCULAR FINDINGS:  LUNG BASES: Interstitial changes in lung bases. Large hiatal hernia.  No pneumothorax or pleural effusion. No significant clinically. Mild  pericardial effusion.    ABDOMEN: Scattered simple-appearing hepatic cysts. Mild intrahepatic  biliary ductal dilation. Cholelithiasis. Hepatic granulomas. Duodenal  diverticulum. Small renal hypodensities are too small to characterize  but likely represent simple cysts in the left kidney. There is a  simple right renal cyst. No  hydronephrosis. The adrenal glands,  spleen, and pancreas are otherwise unremarkable.    PELVIS: No abnormally dilated loops of bowel. No free fluid or free  air. Multiple clips are noted in the distal descending colon without  evidence of active bleeding. There is some inflammation associated  with a diverticulum as seen on series 8 image 22. Bladder is not  distended.    MUSCULOSKELETAL: Significant degenerative disease of the hips and  lower lumbar spine.      Impression    IMPRESSION:  1.  No evidence of active GI bleeding is seen.   2.  Significant colonic diverticulosis. There is some distal  descending pericolonic inflammation around a diverticula suggesting  mild diverticulitis.  3.  Cholelithiasis.  4.  Hiatal hernia.  5.  Interstitial changes in the lung bases. Consider noncontrast CT of  the chest for further evaluation.    BRYAN URIARTE MD         SYSTEM ID:  [id number]

## 2021-11-01 NOTE — PROGRESS NOTES
[facility]  Gastroenterology Progress Note     Floridalma Cunningham MRN# [id number]   YOB: 1935 Age: 86 year old          Assessment and Plan:   Floridalma Cunningham is a 86 year old female with admitted with  multiple episodes of hematochezia.     Hematochezia  Acute blood loss anemia  Hemoglobin  9->8.6->7.9.  10/28 Colonoscopy noted severe diverticulosis in descending colon, narrowing in association with diverticular spasm, erythema at diverticular opening, impacted diverticula with purulent discharge. Consistent with diverticulitis. Active bleeding noted from diverticular opening. Clips placed  Recurrent lower bleed on 10/30.  10/30 Colonoscopy showed clotted blood in the rectum, recto sigmoid colon, sigmoid coon. Recent bleeding seen in a diverticular opening. Injected with epinephrine and tattooed.. Bipolar probe successful with hemostasis,     -- Recheck hemoglobin at noon and if stable can be discharged  -- Continue soft diet  -- Follow-up with [facility] in 1-2 weeks    Addendum  Recurrent bright red bleeding with clots  Discussed with Dr. Oden- will consult IR for possible embolization  Ordered stat hemoglobin  NPO  Updated Dr. Masters- he is aware             Interval History:   no new complaints, doing well, denies chest pain, denies shortness of breath, denies abdominal pain and doing well; no cp, sob, n/v/d, or abd pain.              Review of Systems:   C: NEGATIVE for fever, chills, change in weight  E/M: NEGATIVE for ear, mouth and throat problems  R: NEGATIVE for significant cough or SOB  CV: NEGATIVE for chest pain, palpitations or peripheral edema             Medications:   I have reviewed this patient's current medications    hydrochlorothiazide  12.5 mg Oral Daily     lisinopril  20 mg Oral Daily     piperacillin-tazobactam  3.375 g Intravenous Q6H     polyethylene glycol  17 g Oral Daily     sodium chloride (PF)  3 mL Intracatheter Q8H     trimethoprim  100 mg Oral  Daily                  Physical Exam:   Vitals were reviewed  Vital Signs with Ranges  Temp:  [97.7  F (36.5  C)-98.1  F (36.7  C)] 98  F (36.7  C)  Pulse:  [66-87] 66  Resp:  [16-18] 18  BP: (109-124)/(61-94) 109/75  SpO2:  [94 %-100 %] 94 %  I/O last 3 completed shifts:  In: 240 [P.O.:240]  Out: -   Constitutional: healthy, alert and no distress   Cardiovascular: negative, PMI normal. No lifts, heaves, or thrills. RRR. No murmurs, clicks gallops or rub  Respiratory: negative, Percussion normal. Good diaphragmatic excursion. Lungs clear  Abdomen: Abdomen soft, non-tender. BS normal. No masses, organomegaly             Data:   I reviewed the patient's new clinical lab test results.   Recent Labs   Lab Test 11/01/21  0733 10/31/21  0637 10/31/21  0020 10/30/21  1745 10/30/21  0713 10/29/21  1234 10/29/21  0553 10/26/21  2309 10/26/21  1733 10/26/21  1059 10/26/21  1057   WBC 8.4  --   --   --  10.3  --  13.6*   < >  --   --   --    HGB 7.9* 8.6* 9.0*   < > 7.4*   < > 8.8*   < > 10.9*  --    < >   MCV 89  --   --   --  89  --  89   < >  --   --   --      --   --   --  269  --  343   < >  --   --   --    INR  --   --   --   --   --   --   --   --  1.09 1.14  --     < > = values in this interval not displayed.     Recent Labs   Lab Test 10/31/21  0637 10/30/21  0713 10/29/21  0553   POTASSIUM 4.1 3.9 4.1   CHLORIDE 108 107 108   CO2 24 23 21   BUN 7 9 8   ANIONGAP 6 6 6     Recent Labs   Lab Test 10/26/21  1057 10/21/21  1209 08/12/21  0702 02/16/21  1527 08/31/20  1344 07/07/20  1026   ALBUMIN 2.7* 3.1* 2.8*   < >  --   --    BILITOTAL 0.4 0.6 0.9   < >  --   --    ALT 32 20 25   < >  --   --    AST 24 18 15   < >  --   --    PROTEIN  --   --   --   --  Negative 30*    < > = values in this interval not displayed.       I reviewed the patient's new imaging results.    All laboratory data reviewed  All imaging studies reviewed by me.    Marilee Miller PA-C,  11/1/2021  [facility]  Office :    Cell: [phone] (Dr. Masters)  Cell: [phone] (Marilee Miller PA-C)

## 2021-11-01 NOTE — PLAN OF CARE
A&OX4, VSS on RA. Denies pain, n&V.Had 2 bloody BMs in12 hrs. CT abd without any signs of bleeding. IR embolization de-escalated. On clear liquid diet.Serial Hgb checks, last Hgb was 8.0. Up AX1 with GB and walker. Tele was SR with BBB and PVCs. PIV Sled. Continue to monitor.

## 2021-11-01 NOTE — PROGRESS NOTES
CTA came back negative for an active bleed. Will continue to monitor. Diet and exercise per hospitalist and GI.       Communicated with Dr Oden today.     Thanks,  Tyesha [facility] CNP ([phone]) (phone [phone])

## 2021-11-01 NOTE — PLAN OF CARE
A&Ox4, VSS on RA. Denies pain. Tele SR with occasional PVCs. 1 small BM with red streaks and small clots. Hgb 8.6. Tolerating diet. PIV SL.

## 2021-11-02 ENCOUNTER — APPOINTMENT (OUTPATIENT)
Dept: INTERVENTIONAL RADIOLOGY/VASCULAR | Facility: CLINIC | Age: 86
DRG: 357 | End: 2021-11-02
Attending: NURSE PRACTITIONER
Payer: COMMERCIAL

## 2021-11-02 LAB
HGB BLD-MCNC: 7.7 G/DL (ref 11.7–15.7)
HGB BLD-MCNC: 8.6 G/DL (ref 11.7–15.7)
HGB BLD-MCNC: 8.8 G/DL (ref 11.7–15.7)
HOLD SPECIMEN: NORMAL

## 2021-11-02 PROCEDURE — C1769 GUIDE WIRE: HCPCS

## 2021-11-02 PROCEDURE — 250N000011 HC RX IP 250 OP 636: Performed by: INTERNAL MEDICINE

## 2021-11-02 PROCEDURE — 36415 COLL VENOUS BLD VENIPUNCTURE: CPT | Performed by: STUDENT IN AN ORGANIZED HEALTH CARE EDUCATION/TRAINING PROGRAM

## 2021-11-02 PROCEDURE — 120N000004 HC R&B MS OVERFLOW

## 2021-11-02 PROCEDURE — 272N000566 HC SHEATH CR3

## 2021-11-02 PROCEDURE — 04LB3DZ OCCLUSION OF INFERIOR MESENTERIC ARTERY WITH INTRALUMINAL DEVICE, PERCUTANEOUS APPROACH: ICD-10-PCS | Performed by: RADIOLOGY

## 2021-11-02 PROCEDURE — 258N000003 HC RX IP 258 OP 636: Performed by: NURSE PRACTITIONER

## 2021-11-02 PROCEDURE — 272N000127 HC CATH CR16

## 2021-11-02 PROCEDURE — 75774 ARTERY X-RAY EACH VESSEL: CPT

## 2021-11-02 PROCEDURE — 255N000002 HC RX 255 OP 636: Performed by: RADIOLOGY

## 2021-11-02 PROCEDURE — 250N000011 HC RX IP 250 OP 636: Performed by: NURSE PRACTITIONER

## 2021-11-02 PROCEDURE — 272N000196 HC ACCESSORY CR5

## 2021-11-02 PROCEDURE — 250N000013 HC RX MED GY IP 250 OP 250 PS 637: Performed by: STUDENT IN AN ORGANIZED HEALTH CARE EDUCATION/TRAINING PROGRAM

## 2021-11-02 PROCEDURE — 36248 INS CATH ABD/L-EXT ART ADDL: CPT

## 2021-11-02 PROCEDURE — 250N000011 HC RX IP 250 OP 636: Performed by: RADIOLOGY

## 2021-11-02 PROCEDURE — 75726 ARTERY X-RAYS ABDOMEN: CPT

## 2021-11-02 PROCEDURE — 272N000116 HC CATH CR1

## 2021-11-02 PROCEDURE — 250N000013 HC RX MED GY IP 250 OP 250 PS 637: Performed by: INTERNAL MEDICINE

## 2021-11-02 PROCEDURE — 999N000128 HC STATISTIC PERIPHERAL IV START W/O US GUIDANCE

## 2021-11-02 PROCEDURE — 99233 SBSQ HOSP IP/OBS HIGH 50: CPT | Performed by: STUDENT IN AN ORGANIZED HEALTH CARE EDUCATION/TRAINING PROGRAM

## 2021-11-02 PROCEDURE — 272N000147 HC KIT CR7

## 2021-11-02 PROCEDURE — 85018 HEMOGLOBIN: CPT | Performed by: STUDENT IN AN ORGANIZED HEALTH CARE EDUCATION/TRAINING PROGRAM

## 2021-11-02 PROCEDURE — 37244 VASC EMBOLIZE/OCCLUDE BLEED: CPT

## 2021-11-02 PROCEDURE — C1760 CLOSURE DEV, VASC: HCPCS

## 2021-11-02 PROCEDURE — 250N000013 HC RX MED GY IP 250 OP 250 PS 637: Performed by: HOSPITALIST

## 2021-11-02 PROCEDURE — 36246 INS CATH ABD/L-EXT ART 2ND: CPT

## 2021-11-02 PROCEDURE — 250N000009 HC RX 250: Performed by: NURSE PRACTITIONER

## 2021-11-02 RX ORDER — FLUMAZENIL 0.1 MG/ML
0.2 INJECTION, SOLUTION INTRAVENOUS
Status: DISCONTINUED | OUTPATIENT
Start: 2021-11-02 | End: 2021-11-02 | Stop reason: HOSPADM

## 2021-11-02 RX ORDER — NALOXONE HYDROCHLORIDE 0.4 MG/ML
0.4 INJECTION, SOLUTION INTRAMUSCULAR; INTRAVENOUS; SUBCUTANEOUS
Status: DISCONTINUED | OUTPATIENT
Start: 2021-11-02 | End: 2021-11-02 | Stop reason: HOSPADM

## 2021-11-02 RX ORDER — NITROGLYCERIN 5 MG/ML
3 VIAL (ML) INTRAVENOUS ONCE
Status: COMPLETED | OUTPATIENT
Start: 2021-11-02 | End: 2021-11-02

## 2021-11-02 RX ORDER — CARBOXYMETHYLCELLULOSE SODIUM 5 MG/ML
1 SOLUTION/ DROPS OPHTHALMIC 4 TIMES DAILY PRN
Status: DISCONTINUED | OUTPATIENT
Start: 2021-11-02 | End: 2021-11-04 | Stop reason: HOSPADM

## 2021-11-02 RX ORDER — FENTANYL CITRATE 50 UG/ML
25-50 INJECTION, SOLUTION INTRAMUSCULAR; INTRAVENOUS EVERY 5 MIN PRN
Status: DISCONTINUED | OUTPATIENT
Start: 2021-11-02 | End: 2021-11-02 | Stop reason: HOSPADM

## 2021-11-02 RX ORDER — HEPARIN SODIUM 200 [USP'U]/100ML
1 INJECTION, SOLUTION INTRAVENOUS CONTINUOUS PRN
Status: DISCONTINUED | OUTPATIENT
Start: 2021-11-02 | End: 2021-11-02 | Stop reason: HOSPADM

## 2021-11-02 RX ORDER — NALOXONE HYDROCHLORIDE 0.4 MG/ML
0.2 INJECTION, SOLUTION INTRAMUSCULAR; INTRAVENOUS; SUBCUTANEOUS
Status: DISCONTINUED | OUTPATIENT
Start: 2021-11-02 | End: 2021-11-02 | Stop reason: HOSPADM

## 2021-11-02 RX ORDER — IOPAMIDOL 612 MG/ML
100 INJECTION, SOLUTION INTRAVASCULAR ONCE
Status: COMPLETED | OUTPATIENT
Start: 2021-11-02 | End: 2021-11-02

## 2021-11-02 RX ORDER — SODIUM CHLORIDE 9 MG/ML
INJECTION, SOLUTION INTRAVENOUS CONTINUOUS
Status: DISCONTINUED | OUTPATIENT
Start: 2021-11-02 | End: 2021-11-04 | Stop reason: HOSPADM

## 2021-11-02 RX ADMIN — FENTANYL CITRATE 25 MCG: 50 INJECTION, SOLUTION INTRAMUSCULAR; INTRAVENOUS at 12:49

## 2021-11-02 RX ADMIN — MIDAZOLAM HYDROCHLORIDE 0.5 MG: 1 INJECTION, SOLUTION INTRAMUSCULAR; INTRAVENOUS at 12:49

## 2021-11-02 RX ADMIN — MIDAZOLAM HYDROCHLORIDE 0.5 MG: 1 INJECTION, SOLUTION INTRAMUSCULAR; INTRAVENOUS at 12:57

## 2021-11-02 RX ADMIN — Medication 1 DROP: at 15:29

## 2021-11-02 RX ADMIN — FENTANYL CITRATE 25 MCG: 50 INJECTION, SOLUTION INTRAMUSCULAR; INTRAVENOUS at 13:15

## 2021-11-02 RX ADMIN — PIPERACILLIN SODIUM AND TAZOBACTAM SODIUM 3.38 G: 3; .375 INJECTION, POWDER, LYOPHILIZED, FOR SOLUTION INTRAVENOUS at 05:38

## 2021-11-02 RX ADMIN — MIDAZOLAM HYDROCHLORIDE 0.5 MG: 1 INJECTION, SOLUTION INTRAMUSCULAR; INTRAVENOUS at 13:15

## 2021-11-02 RX ADMIN — FENTANYL CITRATE 50 MCG: 50 INJECTION, SOLUTION INTRAMUSCULAR; INTRAVENOUS at 12:14

## 2021-11-02 RX ADMIN — PIPERACILLIN SODIUM AND TAZOBACTAM SODIUM 3.38 G: 3; .375 INJECTION, POWDER, LYOPHILIZED, FOR SOLUTION INTRAVENOUS at 13:56

## 2021-11-02 RX ADMIN — NITROGLYCERIN 0.3 MG: 10 INJECTION INTRAVENOUS at 12:50

## 2021-11-02 RX ADMIN — ACETAMINOPHEN 650 MG: 325 TABLET, FILM COATED ORAL at 05:38

## 2021-11-02 RX ADMIN — HEPARIN SODIUM 1 BAG: 200 INJECTION, SOLUTION INTRAVENOUS at 12:16

## 2021-11-02 RX ADMIN — IOPAMIDOL 64 ML: 612 INJECTION, SOLUTION INTRAVENOUS at 13:21

## 2021-11-02 RX ADMIN — NITROGLYCERIN 0.3 MG: 10 INJECTION INTRAVENOUS at 12:43

## 2021-11-02 RX ADMIN — LIDOCAINE HYDROCHLORIDE 8 ML: 10 INJECTION, SOLUTION INFILTRATION; PERINEURAL at 13:21

## 2021-11-02 RX ADMIN — FENTANYL CITRATE 25 MCG: 50 INJECTION, SOLUTION INTRAMUSCULAR; INTRAVENOUS at 12:58

## 2021-11-02 RX ADMIN — SODIUM CHLORIDE: 9 INJECTION, SOLUTION INTRAVENOUS at 13:50

## 2021-11-02 RX ADMIN — Medication 1 DROP: at 11:49

## 2021-11-02 RX ADMIN — Medication 1 DROP: at 21:20

## 2021-11-02 RX ADMIN — ACETAMINOPHEN 650 MG: 325 TABLET, FILM COATED ORAL at 23:34

## 2021-11-02 RX ADMIN — POLYETHYLENE GLYCOL 3350 17 G: 17 POWDER, FOR SOLUTION ORAL at 09:15

## 2021-11-02 RX ADMIN — PIPERACILLIN SODIUM AND TAZOBACTAM SODIUM 3.38 G: 3; .375 INJECTION, POWDER, LYOPHILIZED, FOR SOLUTION INTRAVENOUS at 20:35

## 2021-11-02 RX ADMIN — MIDAZOLAM HYDROCHLORIDE 1 MG: 1 INJECTION, SOLUTION INTRAMUSCULAR; INTRAVENOUS at 12:14

## 2021-11-02 RX ADMIN — FENTANYL CITRATE 25 MCG: 50 INJECTION, SOLUTION INTRAMUSCULAR; INTRAVENOUS at 12:34

## 2021-11-02 RX ADMIN — LISINOPRIL 20 MG: 20 TABLET ORAL at 09:13

## 2021-11-02 RX ADMIN — NITROGLYCERIN 0.3 MG: 10 INJECTION INTRAVENOUS at 12:40

## 2021-11-02 RX ADMIN — MIDAZOLAM HYDROCHLORIDE 0.5 MG: 1 INJECTION, SOLUTION INTRAMUSCULAR; INTRAVENOUS at 12:35

## 2021-11-02 RX ADMIN — TRIMETHOPRIM 100 MG: 100 TABLET ORAL at 09:13

## 2021-11-02 ASSESSMENT — ACTIVITIES OF DAILY LIVING (ADL)
ADLS_ACUITY_SCORE: 11
ADLS_ACUITY_SCORE: 9
ADLS_ACUITY_SCORE: 13
ADLS_ACUITY_SCORE: 15
ADLS_ACUITY_SCORE: 13
ADLS_ACUITY_SCORE: 9
ADLS_ACUITY_SCORE: 13
ADLS_ACUITY_SCORE: 13
ADLS_ACUITY_SCORE: 11
ADLS_ACUITY_SCORE: 9
ADLS_ACUITY_SCORE: 11
ADLS_ACUITY_SCORE: 13
ADLS_ACUITY_SCORE: 9
ADLS_ACUITY_SCORE: 11
ADLS_ACUITY_SCORE: 9
ADLS_ACUITY_SCORE: 9
ADLS_ACUITY_SCORE: 15
ADLS_ACUITY_SCORE: 11
ADLS_ACUITY_SCORE: 11
ADLS_ACUITY_SCORE: 13
ADLS_ACUITY_SCORE: 9
ADLS_ACUITY_SCORE: 15
ADLS_ACUITY_SCORE: 11
ADLS_ACUITY_SCORE: 13

## 2021-11-02 NOTE — PROGRESS NOTES
"SPIRITUAL HEALTH SERVICES Progress Note  [facility]    Length-of-Stay visit.    Upon my introduction, Ptnt Floridalma welcomed me and shared thoughts about the \"rollercoaster\" she's experienced in hospital with her health, expecting to discharge, then \"getting worse again\" and staying. She spoke of being appreciative of all she has, including the support of her daughters, friends and [employer] members.    Floridalma also shared general observations on society, justice, and how much the world has changed in her lifetime.    Floridalma said she's an active member of [employer], her  has called her, and she misses the devotions she's used to studying.    I offered reflective conversation and emotional and spiritual support, gave her a copy of the  reflections anthology to read, and sang a blessing.     remains available.    Ila Peraza  Associate   "

## 2021-11-02 NOTE — PROVIDER NOTIFICATION
MD Notification    Notified Person: MD    Notified Person Name: Dr. Masters     Notification Date/Time: 11/2/2021 0900    Notification Interaction: Phone    Purpose of Notification: Pt still having medium/large amount of bloody stool and clots x2, seems to have stopped    Orders Received:    Comments:

## 2021-11-02 NOTE — PROGRESS NOTES
[facility]  Gastroenterology Progress Note     Floridalma Cunningham MRN# [id number]   YOB: 1935 Age: 86 year old          Assessment and Plan:   Floridalma Cunningham is a 86 year old female with admitted with  multiple episodes of hematochezia.      Hematochezia  Acute blood loss anemia  Hemoglobin  9->8.6->7.9. (3 episodes of marroon, red of stool this a.m.)  10/28 Colonoscopy noted severe diverticulosis in descending colon, narrowing in association with diverticular spasm, erythema at diverticular opening, impacted diverticula with purulent discharge. Consistent with diverticulitis. Active bleeding noted from diverticular opening. Clips placed  Recurrent lower bleed on 10/30.  10/30 Colonoscopy showed clotted blood in the rectum, recto sigmoid colon, sigmoid coon. Recent bleeding seen in a diverticular opening. Injected with epinephrine and tattooed.. Bipolar probe successful with hemostasis,  CTA of A/P noted no evidence of active lower GI bleed  Discussed with IR if empiric embolization as a treatment option. States there is significant concern for infarct so would like to treat conservatively.     -- serial hemoglobin  -- Full liquid diet  -- If ongoing bleeding discuss again with IR for angiogram and evaluation by colorectal surgery              Interval History:   doing well, denies chest pain, denies shortness of breath, denies abdominal pain, has had a bowel movement in the last 24 hours and stools maroon.              Review of Systems:   C: NEGATIVE for fever, chills, change in weight  E/M: NEGATIVE for ear, mouth and throat problems  R: NEGATIVE for significant cough or SOB  CV: NEGATIVE for chest pain, palpitations or peripheral edema             Medications:   I have reviewed this patient's current medications    lisinopril  20 mg Oral Daily     piperacillin-tazobactam  3.375 g Intravenous Q6H     polyethylene glycol  17 g Oral Daily     sodium chloride (PF)  3 mL  Intracatheter Q8H     trimethoprim  100 mg Oral Daily                  Physical Exam:   Vitals were reviewed  Vital Signs with Ranges  Temp:  [97.4  F (36.3  C)-97.9  F (36.6  C)] 97.4  F (36.3  C)  Pulse:  [77-93] 93  Resp:  [16-18] 18  BP: (106-150)/(70-95) 116/81  SpO2:  [94 %-98 %] 98 %  I/O last 3 completed shifts:  In: 600 [P.O.:600]  Out: -   Constitutional: healthy, alert and no distress   Cardiovascular: negative, PMI normal. No lifts, heaves, or thrills. RRR. No murmurs, clicks gallops or rub  Respiratory: negative, Percussion normal. Good diaphragmatic excursion. Lungs clear  Abdomen: Abdomen soft, non-tender. BS normal. No masses, organomegaly           Data:   I reviewed the patient's new clinical lab test results.   Recent Labs   Lab Test 11/02/21  0512 11/01/21  2145 11/01/21  1111 11/01/21  0733 11/01/21  0733 10/30/21  1745 10/30/21  0713 10/29/21  1234 10/29/21  0553 10/26/21  2309 10/26/21  1733 10/26/21  1059 10/26/21  1057   WBC  --   --   --   --  8.4  --  10.3  --  13.6*   < >  --   --   --    HGB 8.6* 8.5* 8.0*   < > 7.9*   < > 7.4*   < > 8.8*   < > 10.9*  --    < >   MCV  --   --   --   --  89  --  89  --  89   < >  --   --   --    PLT  --   --   --   --  275  --  269  --  343   < >  --   --   --    INR  --   --   --   --   --   --   --   --   --   --  1.09 1.14  --     < > = values in this interval not displayed.     Recent Labs   Lab Test 10/31/21  0637 10/30/21  0713 10/29/21  0553   POTASSIUM 4.1 3.9 4.1   CHLORIDE 108 107 108   CO2 24 23 21   BUN 7 9 8   ANIONGAP 6 6 6     Recent Labs   Lab Test 10/26/21  1057 10/21/21  1209 08/12/21  0702 02/16/21  1527 08/31/20  1344 07/07/20  1026   ALBUMIN 2.7* 3.1* 2.8*   < >  --   --    BILITOTAL 0.4 0.6 0.9   < >  --   --    ALT 32 20 25   < >  --   --    AST 24 18 15   < >  --   --    PROTEIN  --   --   --   --  Negative 30*    < > = values in this interval not displayed.       I reviewed the patient's new imaging results.    All laboratory data  reviewed  All imaging studies reviewed by me.    Marilee Miller PA-C,  11/2/2021  [facility]  Office : [phone]  Cell: [phone] (Dr. Masters)  Cell: [phone] (Marilee Miller PA-C)

## 2021-11-02 NOTE — PROGRESS NOTES
[facility]    Hospitalist Progress Note    Date of Service (when I saw the patient): 11/02/2021    Assessment & Plan   Floridalma Cunningham is a 86 year old female with a history of hypertension, hyperlipidemia, and recurrent UTIs who presented to the ER following an episode of abdominal followed by a large bloody bowel movement. After evaluation in the ER, the hospitalist service was contacted to bring her into the hospital for further evaluation.     Abdominal pain with hematochezia, due to diverticular bleeding s/p colonoscopy with clips placed  Diverticulitis with acute diverticular bleed, recurrent  Acute anemia due to blood loss   On the morning of admission she developed lower abdominal pain and got up to use the bathroom.  She had a large bloody bowel movement on her way into the bathroom.  Felt better afterwards and then did not have any further bloody bowel movements.  Takes Aleve 2 or 3 days/week. Recently started taking turmeric.  Hemoglobin stable at 11.6 in the ER down from 13.  Vitals were stable.  While being monitored in the hospital, patient once again started having repeated and frequent bloody stools.  Colonoscopy 10/28 shows severe diverticulosis in the descending colon, with evidence of impacted diverticulum and purulent discharge indicative of diverticulitis.  There is active bleeding coming from the diverticular opening.  There is recent bleeding from a couple diverticuli that were injected and treated with bipolar cautery.  Clips were placed.  -Taken back to 10/30 for sigmoidoscopy which found new bleeding, new clip placed with cautery  -Recurrent episode of bleeding 11/01, IR consulted and angiogram performed which was negative for active bleeding. No intervention performed    >Another episode of bleeding this morning, BRB in toilet with nursing and soiled 2 pads, clots present  > IR planning to take for angiogram and possible embolization near clips  > Continue to trend  hemoglobin and transfuse for <7  > Continue IV zosyn for now and transition to orals on discharge   > Avoid NSAIDs indefinitely and tumeric as this can thin the blood and increase risk of bleeding.  > close monitoring of vitals and for recurrent bleeding  > There is potential for getting CRS involved if unable to control the bleeding    Hypokalemia  Hyponatremia, mild  sodium dropped to 132 from 136, potassium down to 3.2.  Likely GI losses from bowel prep and clear liquid diet.      > K replacement protocol ordered    Hypertension  Blood pressure has been variable, overall reasonably controlled    > Continue PTA lisinopril with hold parameters.  > hold hydrochlorothiazide with ongoing bleeding     Hyperlipidemia  Hold PTA atorvastatin      Recurrent UTIs  Currently asymptomatic.  Continue PTA prophylactic trimethoprim.  (Noted patient is on IV Zosyn started by GI)     COVID-19 PCR negative  Routine admission COVID-19 PCR testing was negative on 10/26/2021.  She received the Moderna COVID-19 vaccine on 1/17/2021 and 2/14/2021.        Diet:  CLD  DVT Prophylaxis: PCDs in light of GI bleed  Jackson Catheter: Not present  Central Lines: None  Code Status: No CPR- Do NOT Intubate      Disposition: unclear    Nevaeh Scharb    Interval History   Patient still feeling well with L ankle pain being her biggest complaint. She denies dizziness or feeling lightheaded. She was passing gas and concerned that she might be bleeding during my exam. This turned out to be true. No cough. No SOA. No headaches.    -Data reviewed today: I reviewed all new labs and imaging results over the last 24 hours.     Physical Exam   Temp: 97.4  F (36.3  C) Temp src: Oral BP: 121/69 Pulse: 80   Resp: 15 SpO2: 100 % O2 Device: Nasal cannula Oxygen Delivery: 2 LPM  Vitals:    10/26/21 1441   Weight: 73.4 kg (161 lb 13.1 oz)     Vital Signs with Ranges  Temp:  [97.4  F (36.3  C)-97.9  F (36.6  C)] 97.4  F (36.3  C)  Pulse:  [77-93] 80  Resp:  [10-26]  15  BP: (106-150)/() 121/69  SpO2:  [94 %-100 %] 100 %  I/O last 3 completed shifts:  In: 600 [P.O.:600]  Out: -     Constitutional: Well-appearing, lying in bed.  Alert, oriented to person, pale  Respiratory:  Lungs CTAB.  No crackles, wheezes, or rhonchi, no labored breathing.  Cardiovascular:  Heart RRR, no murmur, no edema.  GI:  Abdomen soft,  + BS not overtly tender to palpation  Skin/Integumen:  Warm, dry, non-diaphoretic.  MSK: CMS x4 intact. No swelling noted in R ankle joint    Medications     heparin 2 units/mL in 0.9% NaCl TABLE SOLN       sodium chloride         iopamidol  100 mL INTRA-ARTERIAL Once     lisinopril  20 mg Oral Daily     nitroGLYcerin in D5W  3 mL INTRA-ARTERIAL Once     piperacillin-tazobactam  3.375 g Intravenous Q6H     polyethylene glycol  17 g Oral Daily     sodium chloride (PF)  3 mL Intracatheter Q8H     trimethoprim  100 mg Oral Daily       Data   Recent Labs   Lab 11/02/21  0512 11/01/21  2145 11/01/21  1111 11/01/21  0733 11/01/21  0733 10/31/21  0637 10/31/21  0637 10/30/21  1745 10/30/21  0713 10/29/21  1234 10/29/21  1128 10/29/21  0839 10/29/21  0553 10/26/21  2309 10/26/21  1733   WBC  --   --   --   --  8.4  --   --   --  10.3  --   --   --  13.6*   < >  --    HGB 8.6* 8.5* 8.0*   < > 7.9*   < > 8.6*   < > 7.4*   < >  --   --  8.8*   < > 10.9*   MCV  --   --   --   --  89  --   --   --  89  --   --   --  89   < >  --    PLT  --   --   --   --  275  --   --   --  269  --   --   --  343   < >  --    INR  --   --   --   --   --   --   --   --   --   --   --   --   --   --  1.09   NA  --   --   --   --   --   --  138  --  136  --   --   --  135   < >  --    POTASSIUM  --   --   --   --   --   --  4.1  --  3.9  --   --   --  4.1   < >  --    CHLORIDE  --   --   --   --   --   --  108  --  107  --   --   --  108   < >  --    CO2  --   --   --   --   --   --  24  --  23  --   --   --  21   < >  --    BUN  --   --   --   --   --   --  7  --  9  --   --   --  8   < >  --     CR  --   --   --   --   --   --  0.77  --  0.79  --   --   --  0.86   < >  --    ANIONGAP  --   --   --   --   --   --  6  --  6  --   --   --  6   < >  --    VLADIMIR  --   --   --   --   --   --  7.8*  --  7.6*  --   --   --  8.1*   < >  --    GLC  --   --   --   --   --   --  82  --  88  --  222*   < > 100*   < >  --     < > = values in this interval not displayed.       Recent Results (from the past 24 hour(s))   CTA Abdomen Pelvis with Contrast    Narrative    EXAM: CTA ABDOMEN PELVIS WITH CONTRAST  DATE/TIME: 11/1/2021 1:49 PM    INDICATION: GI bleed  COMPARISON: None.    TECHNIQUE: Helical acquisition through the abdomen and pelvis was  performed during the noncontrast, arterial, and delayed phases phase  of contrast enhancement. 2D and 3D reconstructions performed by the CT  technologist. Dose reduction techniques were used.  CONTRAST: 80 ml Isovue 370 from a single use vial    FINDINGS:   ARTERIAL FINDINGS: The abdominal aorta is patent with moderate  calcified atherosclerotic disease. The visceral arteries all appear  patent without significant focal vascular abnormality. No active  bleeding seen. No stenosis, aneurysm, or dissection.    NONVASCULAR FINDINGS:  LUNG BASES: Interstitial changes in lung bases. Large hiatal hernia.  No pneumothorax or pleural effusion. No significant clinically. Mild  pericardial effusion.    ABDOMEN: Scattered simple-appearing hepatic cysts. Mild intrahepatic  biliary ductal dilation. Cholelithiasis. Hepatic granulomas. Duodenal  diverticulum. Small renal hypodensities are too small to characterize  but likely represent simple cysts in the left kidney. There is a  simple right renal cyst. No hydronephrosis. The adrenal glands,  spleen, and pancreas are otherwise unremarkable.    PELVIS: No abnormally dilated loops of bowel. No free fluid or free  air. Multiple clips are noted in the distal descending colon without  evidence of active bleeding. There is some inflammation  associated  with a diverticulum as seen on series 8 image 22. Bladder is not  distended.    MUSCULOSKELETAL: Significant degenerative disease of the hips and  lower lumbar spine.      Impression    IMPRESSION:  1.  No evidence of active GI bleeding is seen.   2.  Significant colonic diverticulosis. There is some distal  descending pericolonic inflammation around a diverticula suggesting  mild diverticulitis.  3.  Cholelithiasis.  4.  Hiatal hernia.  5.  Interstitial changes in the lung bases. Consider noncontrast CT of  the chest for further evaluation.    BRYAN URIARTE MD         SYSTEM ID:  [id number]

## 2021-11-02 NOTE — PLAN OF CARE
A&Ox4. VSS on RA. IVF infusing. FLD. Voiding, bloody/maroon, 3 episodes bleeding/clots this am, MD notified. Completed visceral angio today, no complications, see note. Off bedrest @ 1620, ambulated in hallway, R groin site CDI, CMS intact. Tele SR w/ PVCs. Had 1 bloody BM after angio, monitor for decreased bleeding. Pt denies dizziness, lightheadedness, n/t. GI following. Hgb recheck in am.

## 2021-11-02 NOTE — PLAN OF CARE
A/O x4. VSS on RA. Denies N/V. Clear liquid diet. IV Zosyn. Up with SBA. Small amount of BM w/ blood clots. Prn Tylenol given for left foot/ankle pain. Hgb 8.6. Tele NSR w/ occasional PVCs. . PIV SL. Continue to monitor.

## 2021-11-02 NOTE — IR NOTE
Interventional Radiology Intra-procedural Nursing Note    Patient Name: Floridalma Cunningham  Medical Record Number: [id number]  Today's Date: November 2, 2021    Start Time: 1214  End of procedure time: 1320  Procedure: Visceral angiogram with intravenous moderate sedation   Report given to RN    Other Notes: Pt into IR suite 2 via cart. Pt awake and alert. To table in supine position prepped and drapped with 2%. VSS. Tele SR with PACs. Dr. Forrest in room. Time out and procedure started. Pt tolerated procedure well. Debrief with Dr. Forrest. Angioseal placed. Dressing CDI. No complications. Pt transferred back to short stay. Report given over the phone.    Bedrest for 3 hours, until 1620.    Medications: Last sedation given at 1315    Versed 3mg  Fentanyl 150mcg  Lidocaine 1% 8ml  Nitroglycerin 900mcg    Laurel Rodriguez RN

## 2021-11-02 NOTE — PROGRESS NOTES
A&O. VSS. Hgb q8 hrs. Last Hgb 8.5. Small about of blood in stool. Denies pain. On Zosyn. Tele SR with BBB and frequent PVC's

## 2021-11-02 NOTE — PRE-PROCEDURE
GENERAL PRE-PROCEDURE:   Procedure:  Visceral angiogram with intravenous moderate sedation   Date/Time:  11/2/2021 11:25 AM    Written consent obtained?: Yes    Risks and benefits: Risks, benefits and alternatives were discussed    Consent given by:  Patient  Patient states understanding of procedure being performed: Yes    Patient's understanding of procedure matches consent: Yes    Procedure consent matches procedure scheduled: Yes    Expected level of sedation:  Moderate  Appropriately NPO:  Yes  ASA Class:  3  Mallampati  :  Grade 4- soft palate obscured by base of tongue  Lungs:  Lungs clear with good breath sounds bilaterally  Heart:  Normal heart sounds and rate, other (comment) and systolic murmur  Heart exam comment:  Occasional irregular beat  History & Physical reviewed:  History and physical reviewed and no updates needed  Statement of review:  I have reviewed the lab findings, diagnostic data, medications, and the plan for sedation    Floridalma Cunningham has continued with bloody stools this am. Discussed with IR Dr Forrest. Recommend a visceral angiogram with possible embolization and/or prophylactic embolization of the area noted to be bleeding during colonoscopy (diverticular clips) in the left sigmoid. Discussed with Floridalma who agreed to the angiogram and has signed consent.     Hemoglobin post rectal bleeding has not been done yet. Last hemoglobin done was at 5am.     Thanks [facility] Interventional Radiology CNP ([phone]) (phone [phone])

## 2021-11-03 ENCOUNTER — APPOINTMENT (OUTPATIENT)
Dept: GENERAL RADIOLOGY | Facility: CLINIC | Age: 86
DRG: 357 | End: 2021-11-03
Attending: STUDENT IN AN ORGANIZED HEALTH CARE EDUCATION/TRAINING PROGRAM
Payer: COMMERCIAL

## 2021-11-03 LAB
ABO/RH(D): NORMAL
ANTIBODY SCREEN: NEGATIVE
BLD PROD TYP BPU: NORMAL
BLOOD COMPONENT TYPE: NORMAL
CODING SYSTEM: NORMAL
CROSSMATCH: NORMAL
HGB BLD-MCNC: 6.9 G/DL (ref 11.7–15.7)
HGB BLD-MCNC: 7.3 G/DL (ref 11.7–15.7)
HGB BLD-MCNC: 7.7 G/DL (ref 11.7–15.7)
ISSUE DATE AND TIME: NORMAL
SARS-COV-2 RNA RESP QL NAA+PROBE: NEGATIVE
SPECIMEN EXPIRATION DATE: NORMAL
UNIT ABO/RH: NORMAL
UNIT NUMBER: NORMAL
UNIT STATUS: NORMAL
UNIT TYPE ISBT: 6200

## 2021-11-03 PROCEDURE — 36415 COLL VENOUS BLD VENIPUNCTURE: CPT | Performed by: STUDENT IN AN ORGANIZED HEALTH CARE EDUCATION/TRAINING PROGRAM

## 2021-11-03 PROCEDURE — 250N000011 HC RX IP 250 OP 636: Performed by: INTERNAL MEDICINE

## 2021-11-03 PROCEDURE — 73610 X-RAY EXAM OF ANKLE: CPT | Mod: LT

## 2021-11-03 PROCEDURE — 36415 COLL VENOUS BLD VENIPUNCTURE: CPT | Performed by: HOSPITALIST

## 2021-11-03 PROCEDURE — 250N000013 HC RX MED GY IP 250 OP 250 PS 637: Performed by: HOSPITALIST

## 2021-11-03 PROCEDURE — 99232 SBSQ HOSP IP/OBS MODERATE 35: CPT | Performed by: STUDENT IN AN ORGANIZED HEALTH CARE EDUCATION/TRAINING PROGRAM

## 2021-11-03 PROCEDURE — 120N000004 HC R&B MS OVERFLOW

## 2021-11-03 PROCEDURE — 86900 BLOOD TYPING SEROLOGIC ABO: CPT | Performed by: HOSPITALIST

## 2021-11-03 PROCEDURE — 999N000127 HC STATISTIC PERIPHERAL IV START W US GUIDANCE

## 2021-11-03 PROCEDURE — 85018 HEMOGLOBIN: CPT | Performed by: STUDENT IN AN ORGANIZED HEALTH CARE EDUCATION/TRAINING PROGRAM

## 2021-11-03 PROCEDURE — 87635 SARS-COV-2 COVID-19 AMP PRB: CPT | Performed by: STUDENT IN AN ORGANIZED HEALTH CARE EDUCATION/TRAINING PROGRAM

## 2021-11-03 PROCEDURE — 250N000013 HC RX MED GY IP 250 OP 250 PS 637: Performed by: STUDENT IN AN ORGANIZED HEALTH CARE EDUCATION/TRAINING PROGRAM

## 2021-11-03 RX ORDER — LIDOCAINE 4 G/G
1 PATCH TOPICAL
Status: DISCONTINUED | OUTPATIENT
Start: 2021-11-03 | End: 2021-11-04 | Stop reason: HOSPADM

## 2021-11-03 RX ORDER — LIDOCAINE 4 G/G
1 PATCH TOPICAL
Status: DISCONTINUED | OUTPATIENT
Start: 2021-11-04 | End: 2021-11-03

## 2021-11-03 RX ADMIN — PIPERACILLIN SODIUM AND TAZOBACTAM SODIUM 3.38 G: 3; .375 INJECTION, POWDER, LYOPHILIZED, FOR SOLUTION INTRAVENOUS at 02:12

## 2021-11-03 RX ADMIN — PIPERACILLIN SODIUM AND TAZOBACTAM SODIUM 3.38 G: 3; .375 INJECTION, POWDER, LYOPHILIZED, FOR SOLUTION INTRAVENOUS at 15:19

## 2021-11-03 RX ADMIN — PIPERACILLIN SODIUM AND TAZOBACTAM SODIUM 3.38 G: 3; .375 INJECTION, POWDER, LYOPHILIZED, FOR SOLUTION INTRAVENOUS at 08:43

## 2021-11-03 RX ADMIN — TRIMETHOPRIM 100 MG: 100 TABLET ORAL at 08:43

## 2021-11-03 RX ADMIN — PIPERACILLIN SODIUM AND TAZOBACTAM SODIUM 3.38 G: 3; .375 INJECTION, POWDER, LYOPHILIZED, FOR SOLUTION INTRAVENOUS at 21:15

## 2021-11-03 RX ADMIN — ACETAMINOPHEN 650 MG: 325 TABLET, FILM COATED ORAL at 21:16

## 2021-11-03 RX ADMIN — LISINOPRIL 20 MG: 20 TABLET ORAL at 08:43

## 2021-11-03 RX ADMIN — Medication 1 DROP: at 08:44

## 2021-11-03 RX ADMIN — Medication 1 DROP: at 21:35

## 2021-11-03 ASSESSMENT — ACTIVITIES OF DAILY LIVING (ADL)
ADLS_ACUITY_SCORE: 15

## 2021-11-03 NOTE — PROGRESS NOTES
[facility]    Hospitalist Progress Note    Date of Service (when I saw the patient): 11/03/2021    Assessment & Plan   Floridalma Cunningham is a 86 year old female with a history of hypertension, hyperlipidemia, and recurrent UTIs who presented to the ER following an episode of abdominal followed by a large bloody bowel movement. After evaluation in the ER, the hospitalist service was contacted to bring her into the hospital for further evaluation.     Abdominal pain with hematochezia, due to diverticular bleeding s/p colonoscopy with clips placed  Diverticulitis with acute diverticular bleed, recurrent  Acute anemia due to blood loss   On the morning of admission she developed lower abdominal pain and got up to use the bathroom.  She had a large bloody bowel movement on her way into the bathroom.  Felt better afterwards and then did not have any further bloody bowel movements.  Takes Aleve 2 or 3 days/week. Recently started taking turmeric.  Hemoglobin stable at 11.6 in the ER down from 13.  Vitals were stable.  While being monitored in the hospital, patient once again started having repeated and frequent bloody stools.  Colonoscopy 10/28 shows severe diverticulosis in the descending colon, with evidence of impacted diverticulum and purulent discharge indicative of diverticulitis.  There is active bleeding coming from the diverticular opening.  There is recent bleeding from a couple diverticuli that were injected and treated with bipolar cautery.  Clips were placed.  -Taken back to 10/30 for sigmoidoscopy which found new bleeding, new clip placed with cautery. Received 1 unit of blood  -Recurrent episode of bleeding 11/01, IR consulted and angiogram performed which was negative for active bleeding. No intervention performed  -Again recurrent bleeding 11/02, taken for embolectomy by IR.     > Small amount of bleeding over the night and through today. Nothing like the last few episodes however  remains over the last 12 hours.   > Hb baseline is 13 and on admission 11, shila was 7.4 and she received a transfusion. Recent trend remains in the high 7 range. Continue to follow closely  > Continue IV zosyn for diverticulitis and transition to orals on discharge   > Avoid NSAIDs indefinitely and tumeric as this can thin the blood and increase risk of bleeding.  > close monitoring of vitals and for recurrent bleeding  > There is potential for getting CRS involved if unable to control the bleeding    Hypokalemia  Hyponatremia, mild  sodium dropped to 132 from 136, potassium down to 3.2.  Likely GI losses from bowel prep and clear liquid diet.    Hydrochlorothiazide also contributing and this is now on hold    > K replacement protocol ordered    Hypertension  Blood pressure has been variable, overall reasonably controlled    > Continue PTA lisinopril with hold parameters.  > hold hydrochlorothiazide with ongoing bleeding     Hyperlipidemia  Hold PTA atorvastatin      Recurrent UTIs  Currently asymptomatic.  Continue PTA prophylactic trimethoprim.  (Noted patient is on IV Zosyn started by GI)     COVID-19 PCR negative  Routine admission COVID-19 PCR testing was negative on 10/26/2021.  She received the Moderna COVID-19 vaccine on 1/17/2021 and 2/14/2021.        Diet:  CLD  DVT Prophylaxis: PCDs in light of GI bleed  Jackson Catheter: Not present  Central Lines: None  Code Status: No CPR- Do NOT Intubate      Disposition: unclear    Nevaeh [facility]    Interval History   Patient eating lunch and feeling well. No major questions and she understand everything going on. Denies pain at the moment but often has L ankle and MSK buttocks pain. No dizziness or feeling lightheaded. A few small BMs with blood today. No cough.    -Data reviewed today: I reviewed all new labs and imaging results over the last 24 hours.     Physical Exam   Temp: 97.6  F (36.4  C) Temp src: Oral BP: 109/66 Pulse: 95   Resp: 18 SpO2: 93 % O2 Device:  None (Room air)    Vitals:    10/26/21 1441   Weight: 73.4 kg (161 lb 13.1 oz)     Vital Signs with Ranges  Temp:  [97.4  F (36.3  C)-98  F (36.7  C)] 97.6  F (36.4  C)  Pulse:  [] 95  Resp:  [16-18] 18  BP: (104-137)/(61-89) 109/66  SpO2:  [93 %-97 %] 93 %  I/O last 3 completed shifts:  In: 500 [P.O.:500]  Out: -     Constitutional: Well-appearing. Alert, oriented to person, pale  Respiratory:  Lungs CTAB.  No crackles, wheezes, or rhonchi, no labored breathing.  Cardiovascular:  Heart RRR, no murmur, no edema.  GI:  Abdomen soft,  + BS not overtly tender to palpation  Skin/Integumen:  Warm, dry, non-diaphoretic.  MSK: CMS x4 intact.    Medications     sodium chloride 100 mL/hr at 11/02/21 1350       lidocaine  1 patch Transdermal Q24H     lidocaine   Transdermal Q8H     lisinopril  20 mg Oral Daily     piperacillin-tazobactam  3.375 g Intravenous Q6H     polyethylene glycol  17 g Oral Daily     sodium chloride (PF)  3 mL Intracatheter Q8H     trimethoprim  100 mg Oral Daily       Data   Recent Labs   Lab 11/03/21  0635 11/02/21  2148 11/02/21  1418 11/01/21  1111 11/01/21  0733 10/31/21  0637 10/31/21  0637 10/30/21  1745 10/30/21  0713 10/29/21  1234 10/29/21  1128 10/29/21  0839 10/29/21  0553   WBC  --   --   --   --  8.4  --   --   --  10.3  --   --   --  13.6*   HGB 7.7* 7.7* 8.8*   < > 7.9*   < > 8.6*   < > 7.4*   < >  --   --  8.8*   MCV  --   --   --   --  89  --   --   --  89  --   --   --  89   PLT  --   --   --   --  275  --   --   --  269  --   --   --  343   NA  --   --   --   --   --   --  138  --  136  --   --   --  135   POTASSIUM  --   --   --   --   --   --  4.1  --  3.9  --   --   --  4.1   CHLORIDE  --   --   --   --   --   --  108  --  107  --   --   --  108   CO2  --   --   --   --   --   --  24  --  23  --   --   --  21   BUN  --   --   --   --   --   --  7  --  9  --   --   --  8   CR  --   --   --   --   --   --  0.77  --  0.79  --   --   --  0.86   ANIONGAP  --   --   --   --   --    --  6  --  6  --   --   --  6   VLADIMIR  --   --   --   --   --   --  7.8*  --  7.6*  --   --   --  8.1*   GLC  --   --   --   --   --   --  82  --  88  --  222*   < > 100*    < > = values in this interval not displayed.       No results found for this or any previous visit (from the past 24 hour(s)).

## 2021-11-03 NOTE — PROGRESS NOTES
[facility]  Gastroenterology Progress Note     Floridalma Cunningham MRN# [id number]   YOB: 1935 Age: 86 year old          Assessment and Plan:   Floridalma Cunningham is a 86 year old female with admitted with  multiple episodes of hematochezia.      Hematochezia  Acute blood loss anemia  Hemoglobin  stable at 7.7 with no recurrent rectal bleeding  10/28 Colonoscopy noted severe diverticulosis in descending colon, narrowing in association with diverticular spasm, erythema at diverticular opening, impacted diverticula with purulent discharge. Consistent with diverticulitis. Active bleeding noted from diverticular opening. Clips placed  Recurrent lower bleed on 10/30.  10/30 Flex simg showed clotted blood in the rectum, recto sigmoid colon, sigmoid coon. Recent bleeding seen in a diverticular opening. Injected with epinephrine and tattooed. Bipolar probe successful with hemostasis,  CTA of A/P noted no evidence of active lower GI bleed  Discussed with IR if empiric embolization as a treatment option. Underwent visceral angiogram with embolization. No active extravasation seen during angiogram. A coil was left in the blood supply which leads to the superior clip area.     -- serial hemoglobin  -- Soft diet  -- Continue to monitorr fo rbleeding       Hematochezia      Interval History:   no new complaints, doing well, Intubated and sedated and doing well; no cp, sob, n/v/d, or abd pain.              Review of Systems:   C: NEGATIVE for fever, chills, change in weight  E/M: NEGATIVE for ear, mouth and throat problems  R: NEGATIVE for significant cough or SOB  CV: NEGATIVE for chest pain, palpitations or peripheral edema             Medications:   I have reviewed this patient's current medications    lisinopril  20 mg Oral Daily     piperacillin-tazobactam  3.375 g Intravenous Q6H     polyethylene glycol  17 g Oral Daily     sodium chloride (PF)  3 mL Intracatheter Q8H     trimethoprim  100 mg  Oral Daily                  Physical Exam:   Vitals were reviewed  Vital Signs with Ranges  Temp:  [97.4  F (36.3  C)-98  F (36.7  C)] 97.7  F (36.5  C)  Pulse:  [] 100  Resp:  [9-26] 18  BP: ()/() 137/89  SpO2:  [93 %-100 %] 97 %  I/O last 3 completed shifts:  In: 500 [P.O.:500]  Out: -   Constitutional: healthy, alert and no distress   Cardiovascular: negative, PMI normal. No lifts, heaves, or thrills. RRR. No murmurs, clicks gallops or rub  Respiratory: negative, Percussion normal. Good diaphragmatic excursion. Lungs clear  Abdomen: Abdomen soft, non-tender. BS normal. No masses, organomegaly           Data:   I reviewed the patient's new clinical lab test results.   Recent Labs   Lab Test 11/03/21  0635 11/02/21  2148 11/02/21  1418 11/01/21  1111 11/01/21  0733 10/30/21  1745 10/30/21  0713 10/29/21  1234 10/29/21  0553 10/26/21  2309 10/26/21  1733 10/26/21  1059 10/26/21  1057   WBC  --   --   --   --  8.4  --  10.3  --  13.6*   < >  --   --   --    HGB 7.7* 7.7* 8.8*   < > 7.9*   < > 7.4*   < > 8.8*   < > 10.9*  --    < >   MCV  --   --   --   --  89  --  89  --  89   < >  --   --   --    PLT  --   --   --   --  275  --  269  --  343   < >  --   --   --    INR  --   --   --   --   --   --   --   --   --   --  1.09 1.14  --     < > = values in this interval not displayed.     Recent Labs   Lab Test 10/31/21  0637 10/30/21  0713 10/29/21  0553   POTASSIUM 4.1 3.9 4.1   CHLORIDE 108 107 108   CO2 24 23 21   BUN 7 9 8   ANIONGAP 6 6 6     Recent Labs   Lab Test 10/26/21  1057 10/21/21  1209 08/12/21  0702 02/16/21  1527 08/31/20  1344 07/07/20  1026   ALBUMIN 2.7* 3.1* 2.8*   < >  --   --    BILITOTAL 0.4 0.6 0.9   < >  --   --    ALT 32 20 25   < >  --   --    AST 24 18 15   < >  --   --    PROTEIN  --   --   --   --  Negative 30*    < > = values in this interval not displayed.       I reviewed the patient's new imaging results.    All laboratory data reviewed  All imaging studies reviewed by  me.    Marilee Miller PA-C,  11/3/2021  [facility]  Office : [phone]  Cell: [phone] (Dr. Masters)  Cell: [phone] (Marilee Miller PA-C)

## 2021-11-03 NOTE — PLAN OF CARE
A/O x4. VSS on RA. Denies N/V and dizziness. Mech soft diet. SBA w/ walker. Small amount of BM w/ blood clots x3, improved since angio yesterday. Hgb 7.7, 7.3 @ 1630, MD notified, Conditional transfusion orders for Hgb <7. IV site draining, removed, IV team to come replace IV around 2000. Tele NSR w/ occasional PVCs. Lidocaine patch ordered for LLE pain, rescheduled for 2000. IV Zosyn. R groin site CDI. GI following. Continue to monitor.

## 2021-11-03 NOTE — PROGRESS NOTES
Interventional Radiology Progress Note:  Inpatient at [facility]  Date: November 3, 2021     Brief HPI:Floridalma Cunningham is a 86 year old female with a history of hypertension, hyperlipidemia, and recurrent UTIs who presented to the ER following an episode of abdominal followed by a large bloody bowel movement. She uses Aleve 2-3 times a week.      She had episodes of 5 bloody stools by 10/28 with a hemoglobin drop from 10.5 to 8.9. She had a colonoscopy and a diverticular bleed in the sigmoid colon area was cauterized and clipped.  She had a repeat flex sig on 10/30 after another large blood stool and was transfused also. Clot in the rectum noted, along with a recent bleeding in a diverticulum which were injected with epinephrine. She had been stable with no signs or symptoms of bleeding until she soaked her pad with blood again 11/1 and an IR consult was placed.    CTA was done on 11/1 and no active bleeding was noted so continued conservative measures and serial hemoglobins were recommended. 11/2 she had another episode of bloody BM with clots and IR performed a visceral angiogram with embolization.      Interval History: Patient had no rectal stools or bleeding overnight. C/o ischial? bone pain in her rectal area (not associated with bowel function) which she has been experiencing intermittently for the past year. It usually comes and goes on it's own.     Physical Exam:   Vitals: Temp:  [97.4  F (36.3  C)-98  F (36.7  C)] 97.7  F (36.5  C)  Pulse:  [] 100  Resp:  [9-26] 18  BP: ()/() 137/89  SpO2:  [93 %-100 %] 97 %    General/Neuro: Alert, oriented, pleasant woman in acute distress with rectal pain.  Moves all extremities equally.  Resp: Lungs clear, decreased to auscultation. Normal respirations on room air. Non labored breathing. Equal air entry B/L   Cardiac: S1S2, regular rate and rhythm  Abdomen:  Soft, non-distended, non-tender.  Vascular: right groin procedure site with  dressing CDI. Site soft without tenderness.  DP pulses palpable, no CMS complaints    Labs:  ROUTINE ICU LABS (Last four results)  CMPRecent Labs   Lab 10/31/21  0637 10/30/21  0713 10/29/21  1128 10/29/21  0839 10/29/21  0553 10/28/21  1242 10/28/21  0625 10/28/21  0625    136  --   --  135  --   --  132*   POTASSIUM 4.1 3.9  --   --  4.1 3.7   < > 3.2*   CHLORIDE 108 107  --   --  108  --   --  101   CO2 24 23  --   --  21  --   --  20   ANIONGAP 6 6  --   --  6  --   --  11   GLC 82 88 222* 89 100*  --    < > 114*   BUN 7 9  --   --  8  --   --  12   CR 0.77 0.79  --   --  0.86  --   --  0.84   GFRESTIMATED 70 68  --   --  61  --   --  63   VLADIMIR 7.8* 7.6*  --   --  8.1*  --   --  8.4*    < > = values in this interval not displayed.     CBC  Recent Labs   Lab 11/03/21  0635 11/02/21  2148 11/02/21  1418 11/02/21  0512 11/01/21  1111 11/01/21  0733 10/30/21  1745 10/30/21  0713 10/29/21  1234 10/29/21  0553 10/28/21  1721 10/28/21  0625   WBC  --   --   --   --   --  8.4  --  10.3  --  13.6*  --  13.4*   RBC  --   --   --   --   --  2.80*  --  2.55*  --  3.09*  --  3.20*   HGB 7.7* 7.7* 8.8* 8.6*   < > 7.9*   < > 7.4*   < > 8.8*   < > 8.9*   HCT  --   --   --   --   --  24.9*  --  22.6*  --  27.5*  --  28.1*   MCV  --   --   --   --   --  89  --  89  --  89  --  88   MCH  --   --   --   --   --  28.2  --  29.0  --  28.5  --  27.8   MCHC  --   --   --   --   --  31.7  --  32.7  --  32.0  --  31.7   RDW  --   --   --   --   --  15.6*  --  15.9*  --  15.9*  --  15.6*   PLT  --   --   --   --   --  275  --  269  --  343  --  352    < > = values in this interval not displayed.     INRNo lab results found in last 7 days.  Arterial Blood GasNo lab results found in last 7 days.    Assessment: Floridalma Cunningham is an 86 year old woman with a recent history significant for a GI bleed felt to be due to a bleeding diverticuli and treated with colonoscopy, clipping, cauterizing X 2, last on 10/30 with continued bloody BMs on  11/1 & 11/2 now s/p visceral angiogram with coil embolization.     Hemoglobin drop yesterday from 8.8 to 7.7 could be due to bloody stools prior to embolization and also IV NS fluids started pre angiogram as she was going to receive contrast, her oral intake has been decreased and wanted to optimize renal function. Hemoglobin stable overnight at 7.7.     No active extravasation seen during angiogram. A coil was left in the blood supply which leads to the superior clip area. Full dictation to follow.     Plan: Uncertain how much the embolization will help with the rectal bleeding   -Continue to monitor for bleeding, transfuse per parameters.     If request IR consult again, there will need to be a CTA done during bleeding which identifies a site of active internal extravasation.     Total time spent on the date of the encounter is 15 minutes, including time spent counseling the patient, performing a medically appropriate evaluation, reviewing prior medical history, ordering medications and tests, documenting clinical information in the medical record, and communication of results.    Thanks [facility] Interventional Radiology CNP ([phone]) (phone [phone])

## 2021-11-03 NOTE — PLAN OF CARE
A/O x4. VSS on RA. Denies N/V and dizziness. Full liquid diet. Up with SBA. Small amount of BM w/ blood clots x2. Hgb 7.7. Tele NSR w/ occasional PVCs. Tylenol given for left leg pain. IV Zosyn. R groin site CDI. GI following. Continue to monitor.

## 2021-11-04 VITALS
BODY MASS INDEX: 26.96 KG/M2 | SYSTOLIC BLOOD PRESSURE: 130 MMHG | OXYGEN SATURATION: 96 % | DIASTOLIC BLOOD PRESSURE: 76 MMHG | WEIGHT: 161.82 LBS | TEMPERATURE: 97.8 F | RESPIRATION RATE: 16 BRPM | HEART RATE: 81 BPM | HEIGHT: 65 IN

## 2021-11-04 LAB
HGB BLD-MCNC: 8.9 G/DL (ref 11.7–15.7)
HGB BLD-MCNC: 9.2 G/DL (ref 11.7–15.7)

## 2021-11-04 PROCEDURE — 99239 HOSP IP/OBS DSCHRG MGMT >30: CPT | Performed by: HOSPITALIST

## 2021-11-04 PROCEDURE — 86923 COMPATIBILITY TEST ELECTRIC: CPT | Performed by: STUDENT IN AN ORGANIZED HEALTH CARE EDUCATION/TRAINING PROGRAM

## 2021-11-04 PROCEDURE — 85018 HEMOGLOBIN: CPT | Performed by: PHYSICIAN ASSISTANT

## 2021-11-04 PROCEDURE — 250N000013 HC RX MED GY IP 250 OP 250 PS 637: Performed by: STUDENT IN AN ORGANIZED HEALTH CARE EDUCATION/TRAINING PROGRAM

## 2021-11-04 PROCEDURE — 36415 COLL VENOUS BLD VENIPUNCTURE: CPT | Performed by: STUDENT IN AN ORGANIZED HEALTH CARE EDUCATION/TRAINING PROGRAM

## 2021-11-04 PROCEDURE — 250N000011 HC RX IP 250 OP 636: Performed by: INTERNAL MEDICINE

## 2021-11-04 PROCEDURE — 250N000013 HC RX MED GY IP 250 OP 250 PS 637: Performed by: INTERNAL MEDICINE

## 2021-11-04 PROCEDURE — 85018 HEMOGLOBIN: CPT | Performed by: STUDENT IN AN ORGANIZED HEALTH CARE EDUCATION/TRAINING PROGRAM

## 2021-11-04 PROCEDURE — P9016 RBC LEUKOCYTES REDUCED: HCPCS | Performed by: STUDENT IN AN ORGANIZED HEALTH CARE EDUCATION/TRAINING PROGRAM

## 2021-11-04 PROCEDURE — 250N000013 HC RX MED GY IP 250 OP 250 PS 637: Performed by: HOSPITALIST

## 2021-11-04 PROCEDURE — 36415 COLL VENOUS BLD VENIPUNCTURE: CPT | Performed by: PHYSICIAN ASSISTANT

## 2021-11-04 PROCEDURE — 258N000003 HC RX IP 258 OP 636: Performed by: NURSE PRACTITIONER

## 2021-11-04 RX ADMIN — POLYETHYLENE GLYCOL 3350 17 G: 17 POWDER, FOR SOLUTION ORAL at 08:07

## 2021-11-04 RX ADMIN — PIPERACILLIN SODIUM AND TAZOBACTAM SODIUM 3.38 G: 3; .375 INJECTION, POWDER, LYOPHILIZED, FOR SOLUTION INTRAVENOUS at 04:53

## 2021-11-04 RX ADMIN — Medication 1 DROP: at 12:22

## 2021-11-04 RX ADMIN — SODIUM CHLORIDE: 9 INJECTION, SOLUTION INTRAVENOUS at 04:52

## 2021-11-04 RX ADMIN — PIPERACILLIN SODIUM AND TAZOBACTAM SODIUM 3.38 G: 3; .375 INJECTION, POWDER, LYOPHILIZED, FOR SOLUTION INTRAVENOUS at 11:39

## 2021-11-04 RX ADMIN — LISINOPRIL 20 MG: 20 TABLET ORAL at 08:07

## 2021-11-04 RX ADMIN — LIDOCAINE 1 PATCH: 560 PATCH PERCUTANEOUS; TOPICAL; TRANSDERMAL at 00:01

## 2021-11-04 RX ADMIN — TRIMETHOPRIM 100 MG: 100 TABLET ORAL at 08:07

## 2021-11-04 ASSESSMENT — ACTIVITIES OF DAILY LIVING (ADL)
ADLS_ACUITY_SCORE: 13
ADLS_ACUITY_SCORE: 15
ADLS_ACUITY_SCORE: 13
ADLS_ACUITY_SCORE: 13
ADLS_ACUITY_SCORE: 15
ADLS_ACUITY_SCORE: 13
ADLS_ACUITY_SCORE: 15
ADLS_ACUITY_SCORE: 13
ADLS_ACUITY_SCORE: 15
ADLS_ACUITY_SCORE: 15

## 2021-11-04 NOTE — PROGRESS NOTES
Care Management Discharge Note    Discharge Date: 11/04/2021       Discharge Disposition: Home, Home Care    Discharge Services: None    Discharge DME: None    Discharge Transportation: family or friend will provide (daugher or MALIKA)    Private pay costs discussed: Not applicable    PAS Confirmation Code:    Patient/family educated on Medicare website which has current facility and service quality ratings: yes    Education Provided on the Discharge Plan:    Persons Notified of Discharge Plans: 11/4/21   Patient/Family in Agreement with the Plan: yes    Handoff Referral Completed: No    Additional Information:  Discharge summary and  orders faxed to [facility] per patient choice.  Per initial note patient to arrange her own follow up appointments and transportation.  No further needs.        India Johnson RN Care Coordinator

## 2021-11-04 NOTE — DISCHARGE SUMMARY
[facility]  Discharge Summary        Floridalma Cunningham MRN# [id number]   YOB: 1935 Age: 86 year old     Date of Admission:  10/26/2021  Date of Discharge:  11/4/2021  Admitting Physician:  Florentino Sandy MD  Discharge Physician: Florentino Sandy MD  Discharging Service: Hospitalist     Primary Provider: Alex Whitten  Primary Care Physician Phone Number: [phone]         Discharge Diagnoses/Problem Oriented Hospital Course (Providers):    Floridalma Cunningham was admitted on 10/26/2021 by Florentino Sandy MD and I would refer you to their history and physical.  The following problems were addressed during her hospitalization:    Floridalma Cunningham is a 86 year old female with a history of hypertension, hyperlipidemia, and recurrent UTIs who presented to the ER following an episode of abdominal pain followed by a large bloody bowel movement.      On the morning of admission she developed lower abdominal pain and got up to use the bathroom.  She had a large bloody bowel movement on her way into the bathroom.  Felt better afterwards and then did not have any further bloody bowel movements.  Takes Aleve 2 or 3 days/week. Recently started taking turmeric.  Hemoglobin stable at 11.6 in the ER down from 13.  Vitals were stable.  While being monitored in the hospital, patient once again started having repeated and frequent bloody stools     Abdominal pain with hematochezia, due to diverticular bleeding s/p colonoscopy with clips placed (10/28 and 10/30)  S/p IR embolization on 11/2/21  Diverticulitis with acute diverticular bleed, recurrent  Acute anemia due to blood loss  - evaluated and followed by GI (Dr Masters) and IR  - Colonoscopy 10/28 with severe diverticulosis in the descending colon, with evidence of impacted diverticulum and purulent discharge indicative of diverticulitis.  There is active bleeding coming from the diverticular opening.  There is recent bleeding from a couple  diverticuli that were injected and treated with bipolar cautery.  Clips were placed.  - Taken back to 10/30 for sigmoidoscopy which found new bleeding, new clip placed with cautery; Received 1 unit of blood  - Recurrent episode of bleeding 11/01, IR consulted and angiogram performed which was negative for active bleeding. No intervention performed  - Again recurrent bleeding 11/02, had IR embolization  - on 11/4: overnight still with some intermittent blood in stool but has much improved and no active bleed  - did receive another unit PRBC for Hb of 6.9 on 11/3--- repeat Hb 8.9---9.2, stable  - Hb baseline is 13 and on admission 11, shila was 6.9  - GI cleared for discharge home with close follow up  - patient also instructed to return to ED if any concerns for worsening bleed    - completed 7 days of IV Zosyn  - Avoid NSAIDs indefinitely and tumeric as this can thin the blood and increase risk of bleeding; d/ra PTA Tramadol and Turmeric  - will probably need Colorectal surgery eval if has recurrent diverticular bleed     Hypokalemia  Hyponatremia, mild  - corrected/ supplemented; was noted drop to 132 from 136, potassium down to 3.2.  Likely GI losses from bowel prep and clear liquid diet.    - PTA Hydrochlorothiazide d/ra     Hypertension  Blood pressure has been variable, overall reasonably controlled     > Continue PTA lisinopril  > d/ra hydrochlorothiazide      Hyperlipidemia  - resume PTA atorvastatin      Recurrent UTIs  Currently asymptomatic.  Continue PTA prophylactic trimethoprim.  (also received IV Zosyn  While here for diverticulitis)     COVID-19 PCR negative  Routine admission COVID-19 PCR testing was negative on 10/26/2021.  She received the Moderna COVID-19 vaccine on 1/17/2021 and 2/14/2021.    Code Status: No CPR- Do NOT Intubate       Clinically Significant Risk Factors Present on Admission     Disposition: home with home care PT per PT recs        Brief Hospital Stay Summary Sent Home With  Patient in AVS:        Reason for your hospital stay      You were admitted with bleeding in stool from colon diverticulum. You had   clips placed and you also had an embolization done.                 Pending Results:        Unresulted Labs Ordered in the Past 30 Days of this Admission     Date and Time Order Name Status Description    11/3/2021 11:21 PM CONDITIONAL Prepare red blood cells (unit) Preliminary     10/30/2021  1:45 PM Prepare red blood cells (unit) Preliminary             Discharge Instructions and Follow-Up:      Follow-up Appointments     Follow-up and recommended labs and tests       Follow up with primary care provider, Alex Whitten, within 7 days for   hospital follow- up.  The following labs/tests are recommended: Repeat   Hemoglobin.    Follow up with GI Dr Masters in 1-2 weeks or as suggested.    Return to Emergency if any concerns for worsening bleeding.                 Discharge Disposition:      Discharged to home        Discharge Medications:        Current Discharge Medication List      CONTINUE these medications which have NOT CHANGED    Details   acetaminophen (TYLENOL) 325 MG tablet Take 650 mg by mouth every 8 hours as needed       atorvastatin (LIPITOR) 10 MG tablet TAKE 1 TABLET BY MOUTH EVERY DAY  Qty: 90 tablet, Refills: 1    Comments: DX Code Needed  .  Associated Diagnoses: Hyperlipidemia LDL goal <130      calcium carbonate (TUMS) 500 MG chewable tablet Take 1 chew tab by mouth 2 times daily as needed for heartburn      CRANBERRY PO Take 1 tablet by mouth daily      lisinopril (ZESTRIL) 20 MG tablet Take 1 tablet (20 mg) by mouth daily  Qty: 90 tablet, Refills: 1    Associated Diagnoses: Essential hypertension      multivitamin w/minerals (MULTI-VITAMIN) tablet Take 1 tablet by mouth daily      Omega-3 Fatty Acids (FISH OIL PO) Take 1,000 mg by mouth daily       polyethylene glycol (MIRALAX) 17 g packet Take 17 g by mouth daily  Qty: 7 packet, Refills: 0    Associated Diagnoses:  "H/O total knee replacement, right      trimethoprim (TRIMPEX) 100 MG tablet TAKE 1 TABLET BY MOUTH EVERY DAY  Qty: 90 tablet, Refills: 0    Comments: DX Code Needed  .  Associated Diagnoses: Recurrent UTI         STOP taking these medications       hydrochlorothiazide (HYDRODIURIL) 25 MG tablet Comments:   Reason for Stopping:         traMADol (ULTRAM) 50 MG tablet Comments:   Reason for Stopping:         Turmeric (QC TUMERIC COMPLEX PO) Comments:   Reason for Stopping:                 Allergies:         Allergies   Allergen Reactions     Sulfa Drugs            Consultations This Hospital Stay:      Consultation during this admission received from gastroenterology and Interventional Radiology        Condition and Physical on Discharge:      Discharge condition: Stable   Vitals: Blood pressure 130/76, pulse 81, temperature 97.8  F (36.6  C), temperature source Oral, resp. rate 16, height 1.651 m (5' 5\"), weight 73.4 kg (161 lb 13.1 oz), SpO2 96 %.     Constitutional: Alert, awake and orienetd X 3; lying comfortably in bed in no apparent distress   HEENT: Pupils equal and reactive to light and accomodation, EOMI intact; neck supple no raised JVD or rigidity    Oral cavity: Moist mucosa   Cardiovascular: Normal s1 s2, regular rate and rhythm, no murmur   Lungs: B/l clear to auscultation, no wheezes or crepitations   Abdomen: Soft, nt, nd, no guarding, rigidity or rebound; BS +   LE : No edema   Musculoskeletal: Power 5/5 in all extremities   Neuro: No focal neurological deficits noted, CN II to XII grossly intact   Psychiatry: normal mood and affect             Discharge Time:      Greater than 30 minutes.        Image Results From This Hospital Stay (For Non-EPIC Providers):        Results for orders placed or performed during the hospital encounter of 10/26/21   CTA Abdomen Pelvis with Contrast    Narrative    EXAM: CTA ABDOMEN PELVIS WITH CONTRAST  DATE/TIME: 11/1/2021 1:49 PM    INDICATION: GI bleed  COMPARISON: " None.    TECHNIQUE: Helical acquisition through the abdomen and pelvis was  performed during the noncontrast, arterial, and delayed phases phase  of contrast enhancement. 2D and 3D reconstructions performed by the CT  technologist. Dose reduction techniques were used.  CONTRAST: 80 ml Isovue 370 from a single use vial    FINDINGS:   ARTERIAL FINDINGS: The abdominal aorta is patent with moderate  calcified atherosclerotic disease. The visceral arteries all appear  patent without significant focal vascular abnormality. No active  bleeding seen. No stenosis, aneurysm, or dissection.    NONVASCULAR FINDINGS:  LUNG BASES: Interstitial changes in lung bases. Large hiatal hernia.  No pneumothorax or pleural effusion. No significant clinically. Mild  pericardial effusion.    ABDOMEN: Scattered simple-appearing hepatic cysts. Mild intrahepatic  biliary ductal dilation. Cholelithiasis. Hepatic granulomas. Duodenal  diverticulum. Small renal hypodensities are too small to characterize  but likely represent simple cysts in the left kidney. There is a  simple right renal cyst. No hydronephrosis. The adrenal glands,  spleen, and pancreas are otherwise unremarkable.    PELVIS: No abnormally dilated loops of bowel. No free fluid or free  air. Multiple clips are noted in the distal descending colon without  evidence of active bleeding. There is some inflammation associated  with a diverticulum as seen on series 8 image 22. Bladder is not  distended.    MUSCULOSKELETAL: Significant degenerative disease of the hips and  lower lumbar spine.      Impression    IMPRESSION:  1.  No evidence of active GI bleeding is seen.   2.  Significant colonic diverticulosis. There is some distal  descending pericolonic inflammation around a diverticula suggesting  mild diverticulitis.  3.  Cholelithiasis.  4.  Hiatal hernia.  5.  Interstitial changes in the lung bases. Consider noncontrast CT of  the chest for further evaluation.    BRYAN URIARTE MD          SYSTEM ID:  [id number]    Visceral Angiogram    Narrative    INTERVENTIONAL RADIOLOGY VISCERAL ANGIOGRAM  11/2/2021 1:20 PM     HISTORY:  Patient with lower GI bleeding. She underwent endoscopy and  clipping of vessels in area of active extravasation in the area of  diverticuli in the descending colon.    COMPARISON: CT angiogram of the abdomen and pelvis dated 11/1/2021.    DESCRIPTION OF PROCEDURE: After obtaining informed consent, the  patient was placed in a supine position on the fluoroscopy table. The  right groin was prepped and draped in the usual sterile manner. 1%  lidocaine was injected for local anesthesia. Ultrasound was used to  evaluate and document patency of the right common femoral artery.  Under sterile ultrasound guidance, access into the right common  femoral artery was obtained. An image was saved for documentation. A 6  Chinese vascular sheath was placed. A SOS catheter was used to select  the inferior mesenteric artery for angiography. A 2.8 Chinese Prograde  microcatheter was used to subselect two branches (third order) of the  distal inferior mesenteric artery for angiography.    FINDINGS: No active extravasation was identified. Findings had been  discussed with gastroenterology prior to angiography. Active  extravasation had been seen on endoscopy in the area of the proximal  clips. It was elected to empirically embolize the main branch  providing flow to this region. This was done with two 2 mm x 4 cm  Concerto detachable coils. Follow-up angiography showed paucity of  arterial blood flow about the proximal endoscopic clips. This was felt  to be a satisfactory endpoint for the procedure.    The right femoral sheath was removed. The puncture site was closed  with a 6 Chinese Angio-Seal.    I determined this patient to be an appropriate candidate for the  planned sedation and procedure and reassessed the patient immediately  prior to sedation and procedure. Moderate intravenous  conscious  sedation was supervised by me. The patient was independently monitored  by a registered nurse assigned to the Department of radiology using  automated blood pressure, EKG and pulse oximetry. The patient  tolerated the procedure well. There were no immediate postprocedure  complications. The patient's vital signs were monitored by radiology  nursing staff under my supervision and remained stable throughout the  study. Radiation dose for this scan was reduced using automated  exposure control, adjustment of the mA and/or kV according to patient  size, or iterative reconstruction technique.    MEDICATIONS: 3 mg Versed, 150 mcg fentanyl and nitroglycerin 900 mg    Sedation time: 66 minutes    Fluoroscopy time: 13 minutes    Total fluoroscopy dose: 526 mGy Air Kerma    Contrast: 64 cc Isovue      Impression    IMPRESSION: Mesenteric angiography performed as above. Coil  embolization was performed of a branch providing flow to endoscopic  clips which had been placed in an area of active extravasation  detected on previous endoscopy.     JUICE ATKINSON MD         SYSTEM ID:  [id number]   XR Ankle Left G/E 3 Views    Narrative    LEFT ANKLE THREE OR MORE VIEWS    11/3/2021 1:40 PM     HISTORY:  Ankle pain.    FINDINGS:  Osteopenia. Plantar calcaneal spurring.      Impression    IMPRESSION: Prominent talonavicular degenerative arthrosis. No  fracture identified.      PRAVEEN MALDONADO MD         SYSTEM ID:  [id number]           Most Recent Lab Results In EPIC (For Non-EPIC Providers):    Most Recent 3 CBC's:  Recent Labs   Lab Test 11/04/21  1205 11/04/21  0633 11/03/21  2255 11/01/21  1111 11/01/21  0733 10/30/21  1745 10/30/21  0713 10/29/21  1234 10/29/21  0553   WBC  --   --   --   --  8.4  --  10.3  --  13.6*   HGB 9.2* 8.9* 6.9*   < > 7.9*   < > 7.4*   < > 8.8*   MCV  --   --   --   --  89  --  89  --  89   PLT  --   --   --   --  275  --  269  --  343    < > = values in this interval not displayed.      Most Recent 3  BMP's:  Recent Labs   Lab Test 10/31/21  0637 10/30/21  0713 10/29/21  1128 10/29/21  0839 10/29/21  0553    136  --   --  135   POTASSIUM 4.1 3.9  --   --  4.1   CHLORIDE 108 107  --   --  108   CO2 24 23  --   --  21   BUN 7 9  --   --  8   CR 0.77 0.79  --   --  0.86   ANIONGAP 6 6  --   --  6   VLADIMIR 7.8* 7.6*  --   --  8.1*   GLC 82 88 222*   < > 100*    < > = values in this interval not displayed.     Most Recent 3 Troponin's:No lab results found.    Invalid input(s): TROP, TROPONINIES  Most Recent 3 INR's:  Recent Labs   Lab Test 10/26/21  1733 10/26/21  1059   INR 1.09 1.14     Most Recent 2 LFT's:  Recent Labs   Lab Test 10/26/21  1057 10/21/21  1209   AST 24 18   ALT 32 20   ALKPHOS 119 116   BILITOTAL 0.4 0.6     Most Recent Cholesterol Panel:  Recent Labs   Lab Test 05/20/21  1115   CHOL 163   LDL 74   HDL 67   TRIG 112     Most Recent 6 Bacteria Isolates From Any Culture (See EPIC Reports for Culture Details):  Recent Labs   Lab Test 08/31/20  1344 07/07/20  1025   CULT 50,000 to 100,000 colonies/mL  mixed urogenital carlie  Susceptibility testing not routinely done   >100,000 colonies/mL  Escherichia coli  *  10,000 to 50,000 colonies/mL  mixed urogenital carlie  Susceptibility testing not routinely done       Most Recent TSH, T4 and HgbA1c:   Recent Labs   Lab Test 05/20/21  1115   TSH 0.80  0.80   T4 1.14   A1C 5.3

## 2021-11-04 NOTE — PLAN OF CARE
diagnostic tests and consults completed and resulted NOT MET    Vital signs normal or at patient baseline MET    Nurse to notify provider when observation goals have been met and patient is ready for discharge.

## 2021-11-04 NOTE — PROGRESS NOTES
Interventional Radiology Progress Note:  Inpatient at [facility]  Date: November 4, 2021     History: Floridalma Cunningham is a 86 year old female with a history of hypertension, hyperlipidemia, and recurrent UTIs who presented to the ER following an episode of abdominal followed by a large bloody bowel movement. She uses Aleve 2-3 times a week.      She had episodes of 5 bloody stools by 10/28 with a hemoglobin drop from 10.5 to 8.9. She had a colonoscopy and a diverticular bleed in the sigmoid colon area was cauterized and clipped.  She had a repeat flex sig on 10/30 after another large blood stool and was transfused also. Clot in the rectum noted, along with a recent bleeding in a diverticulum which were injected with epinephrine. She had been stable with no signs or symptoms of bleeding until she soaked her pad with blood again 11/1 and an IR consult was placed.     CTA was done on 11/1 and no active bleeding was noted so continued conservative measures and serial hemoglobins were recommended. 11/2 she had another episode of bloody BM with clots and IR performed a visceral angiogram with embolization.      Floridalma is being seen in IR follow up today.      Interval History:  Feeling good today. Feels that the rectal bleeding has improved since the angiogram. Received blood yesterday. Might go home today or tomorrow. Advanced diet to soft yesterday.      Physical Exam:   Vitals: Temp:  [97.3  F (36.3  C)-98.6  F (37  C)] 97.8  F (36.6  C)  Pulse:  [] 81  Resp:  [16-20] 16  BP: ()/(56-76) 130/76  SpO2:  [93 %-98 %] 96 %    General/Neuro: Alert, oriented, pleasant woman in no acute distress. Moves all extremities equally.  Resp: Lungs clear, decreased to auscultation. Normal respirations on room air. Non labored breathing. Equal air entry B/L   Cardiac: S1S2, regular rate and rhythm  Abdomen:  Soft, non-distended, non-tender.  Vascular: right groin procedure site with dressing CDI. Site soft  without tenderness.  DP pulses palpable, no CMS complaints    Labs:  Recent Labs   Lab 11/04/21  0633 11/03/21  2255 11/03/21  1632 11/01/21  1111 11/01/21  0733 10/30/21  1745 10/30/21  0713 10/29/21  1234 10/29/21  0553   HGB 8.9* 6.9* 7.3*   < > 7.9*   < > 7.4*   < > 8.8*   WBC  --   --   --   --  8.4  --  10.3  --  13.6*    < > = values in this interval not displayed.     Recent Labs   Lab 10/31/21  0637 10/30/21  0713 10/29/21  0553   CR 0.77 0.79 0.86      Assessment: Continues to improve with decreased bloody stools, advanced diet, bleeding less. Possible discharge soon.   -IR has no other options at this time to help with bleeding if she does have an increase in bleeding.     Plan: Discharge and further care plans per GI and Hospitalist. IR will sign off now.     Total time spent on the date of the encounter is 15 minutes, including time spent counseling the patient, performing a medically appropriate evaluation, reviewing prior medical history, ordering medications and tests, documenting clinical information in the medical record, and communication of results.    Thanks [facility] Interventional Radiology CNP ([phone]) (phone [phone])

## 2021-11-04 NOTE — PROGRESS NOTES
[facility]  Gastroenterology Progress Note     Floridalma Cunningham MRN# [id number]   YOB: 1935 Age: 86 year old          Assessment and Plan:   Floridalma Cunningham is a 86 year old female with admitted with  multiple episodes of hematochezia.      Hematochezia  Acute blood loss anemia  Hemoglobin dropped to 6.9 and received one unit and improved to 8.9. Small amounts of darker bloody stools.   10/28 Colonoscopy noted severe diverticulosis in descending colon, narrowing in association with diverticular spasm, erythema at diverticular opening, impacted diverticula with purulent discharge. Consistent with diverticulitis. Active bleeding noted from diverticular opening. Clips placed  Recurrent lower bleed on 10/30.  10/30 Flex simg showed clotted blood in the rectum, recto sigmoid colon, sigmoid coon. Recent bleeding seen in a diverticular opening. Injected with epinephrine and tattooed. Bipolar probe successful with hemostasis,  CTA of A/P noted no evidence of active lower GI bleed  Underwent visceral angiogram with embolization. No active extravasation seen during angiogram. A coil was left in the blood supply which leads to the superior clip area.     -- Recheck hemoglobin at 12 noon and if mid 8 range or higher ok with GI to discharge with close outpatient  f/u  -- Soft diet  -- Continue to monitor for bleeding            Interval History:   no new complaints, doing well, denies chest pain, denies shortness of breath, denies abdominal pain and doing well; no cp, sob, n/v/d, or abd pain.              Review of Systems:   C: NEGATIVE for fever, chills, change in weight  E/M: NEGATIVE for ear, mouth and throat problems  R: NEGATIVE for significant cough or SOB  CV: NEGATIVE for chest pain, palpitations or peripheral edema             Medications:   I have reviewed this patient's current medications    lidocaine  1 patch Transdermal Q24H     lidocaine   Transdermal Q8H     lisinopril  20 mg  Oral Daily     piperacillin-tazobactam  3.375 g Intravenous Q6H     polyethylene glycol  17 g Oral Daily     sodium chloride (PF)  3 mL Intracatheter Q8H     trimethoprim  100 mg Oral Daily                  Physical Exam:   Vitals were reviewed  Vital Signs with Ranges  Temp:  [97.3  F (36.3  C)-98.6  F (37  C)] 97.8  F (36.6  C)  Pulse:  [] 81  Resp:  [16-20] 16  BP: ()/(56-76) 130/76  SpO2:  [93 %-98 %] 96 %  I/O last 3 completed shifts:  In: 1030 [P.O.:330]  Out: -   Constitutional: healthy, alert and no distress   Cardiovascular: negative, PMI normal. No lifts, heaves, or thrills. RRR. No murmurs, clicks gallops or rub  Respiratory: negative, Percussion normal. Good diaphragmatic excursion. Lungs clear  Abdomen: Abdomen soft, non-tender. BS normal. No masses, organomegaly             Data:   I reviewed the patient's new clinical lab test results.   Recent Labs   Lab Test 11/04/21  0633 11/03/21  2255 11/03/21  1632 11/01/21  1111 11/01/21  0733 10/30/21  1745 10/30/21  0713 10/29/21  1234 10/29/21  0553 10/26/21  2309 10/26/21  1733 10/26/21  1059 10/26/21  1057   WBC  --   --   --   --  8.4  --  10.3  --  13.6*   < >  --   --   --    HGB 8.9* 6.9* 7.3*   < > 7.9*   < > 7.4*   < > 8.8*   < > 10.9*  --    < >   MCV  --   --   --   --  89  --  89  --  89   < >  --   --   --    PLT  --   --   --   --  275  --  269  --  343   < >  --   --   --    INR  --   --   --   --   --   --   --   --   --   --  1.09 1.14  --     < > = values in this interval not displayed.     Recent Labs   Lab Test 10/31/21  0637 10/30/21  0713 10/29/21  0553   POTASSIUM 4.1 3.9 4.1   CHLORIDE 108 107 108   CO2 24 23 21   BUN 7 9 8   ANIONGAP 6 6 6     Recent Labs   Lab Test 10/26/21  1057 10/21/21  1209 08/12/21  0702 02/16/21  1527 08/31/20  1344 07/07/20  1026   ALBUMIN 2.7* 3.1* 2.8*   < >  --   --    BILITOTAL 0.4 0.6 0.9   < >  --   --    ALT 32 20 25   < >  --   --    AST 24 18 15   < >  --   --    PROTEIN  --   --   --   --   Negative 30*    < > = values in this interval not displayed.       I reviewed the patient's new imaging results.    All laboratory data reviewed  All imaging studies reviewed by me.    Marilee Miller PA-C,  11/4/2021  [facility]  Office : [phone]  Cell: [phone] (Dr. Masters)  Cell: [phone] (Marilee Miller PA-C)

## 2021-11-04 NOTE — PROGRESS NOTES
Alert and oriented x4. Denies pain.IV replaced on the right arm.NS running continuous at 100 ml/hr.Hgb this shift was 6.9 with a conditional order to transfuse <7. Tranfusion completed, no adverse reaction. Patient on tele with sinus with occasional PAC.

## 2021-11-04 NOTE — PROGRESS NOTES
Patient seen and examined    - eagerly wants to go home today    - reports feeling better; still with some intermittent blood in stool but has much improved and no active bleed  - did receive 1 unit PRBC for Hb of 6.9 on 11/3--- repeat Hb 8.9---9.2  - discussed with GI, okay to discharge home today with close follow up  - patient also instructed to return to ED if any concerns for worsening bleed    Discharge home today with home care PT    Please see discharge summary for details

## 2021-11-04 NOTE — PLAN OF CARE
A&Ox4. VSS on RA. Tolerating soft food diet. SBA w/ walker. 1 episode bleeding this am, last Hgb 9.2. Ok to discharge from GI and IR standpoint. Plan for pt to follow-up w/ GI 1-2 weeks. IV removed. AVS explained and given to pt at discharge all questions answered. Belongings sent w/ pt. Pt daughter providing ride home for pt.

## 2021-11-05 ENCOUNTER — PATIENT OUTREACH (OUTPATIENT)
Dept: CARE COORDINATION | Facility: CLINIC | Age: 86
End: 2021-11-05

## 2021-11-05 ENCOUNTER — MEDICAL CORRESPONDENCE (OUTPATIENT)
Dept: HEALTH INFORMATION MANAGEMENT | Facility: CLINIC | Age: 86
End: 2021-11-05
Payer: COMMERCIAL

## 2021-11-05 DIAGNOSIS — Z71.89 OTHER SPECIFIED COUNSELING: ICD-10-CM

## 2021-11-05 NOTE — PROGRESS NOTES
Clinic Care Coordination Contact  [facility]/Voicemail       Clinical Data: Care Coordinator Outreach  Outreach attempted x 1.   Unable to leave a  voicemail with call back information and requested return call phone just rings.  Plan:  Care Coordinator will try to reach patient again in 1-2 business days.    Briana Butler  Care Transitions Assistant  [facility]

## 2021-11-06 NOTE — PROGRESS NOTES
Clinic Care Coordination Contact  [facility]: Post-Discharge Note  SITUATION                                                      Admission:    Admission Date: 10/26/21   Reason for Admission: Hematochezia  Discharge:   Discharge Date: 11/04/21  Discharge Diagnosis: Hematochezia    BACKGROUND                                                      Floridalma Cunningham was admitted on 10/26/2021 by Florentino Sandy MD and I would refer you to their history and physical.  The following problems were addressed during her hospitalization:     Floridalma Cunningham is a 86 year old female with a history of hypertension, hyperlipidemia, and recurrent UTIs who presented to the ER following an episode of abdominal pain followed by a large bloody bowel movement.       On the morning of admission she developed lower abdominal pain and got up to use the bathroom.  She had a large bloody bowel movement on her way into the bathroom.  Felt better afterwards and then did not have any further bloody bowel movements.  Takes Aleve 2 or 3 days/week. Recently started taking turmeric.  Hemoglobin stable at 11.6 in the ER down from 13.  Vitals were stable.  While being monitored in the hospital, patient once again started having repeated and frequent bloody stools     Abdominal pain with hematochezia, due to diverticular bleeding s/p colonoscopy with clips placed (10/28 and 10/30)  S/p IR embolization on 11/2/21  Diverticulitis with acute diverticular bleed, recurrent  Acute anemia due to blood loss  - evaluated and followed by GI (Dr Masters) and IR  - Colonoscopy 10/28 with severe diverticulosis in the descending colon, with evidence of impacted diverticulum and purulent discharge indicative of diverticulitis.  There is active bleeding coming from the diverticular opening.  There is recent bleeding from a couple diverticuli that were injected and treated with bipolar cautery.  Clips were placed.  - Taken back to 10/30 for sigmoidoscopy which  found new bleeding, new clip placed with cautery; Received 1 unit of blood  - Recurrent episode of bleeding 11/01, IR consulted and angiogram performed which was negative for active bleeding. No intervention performed  - Again recurrent bleeding 11/02, had IR embolization  - on 11/4: overnight still with some intermittent blood in stool but has much improved and no active bleed  - did receive another unit PRBC for Hb of 6.9 on 11/3--- repeat Hb 8.9---9.2, stable  - Hb baseline is 13 and on admission 11, shila was 6.9  - GI cleared for discharge home with close follow up  - patient also instructed to return to ED if any concerns for worsening bleed    - completed 7 days of IV Zosyn  - Avoid NSAIDs indefinitely and tumeric as this can thin the blood and increase risk of bleeding; d/ra PTA Tramadol and Turmeric  - will probably need Colorectal surgery eval if has recurrent diverticular bleed     Hypokalemia  Hyponatremia, mild  - corrected/ supplemented; was noted drop to 132 from 136, potassium down to 3.2.  Likely GI losses from bowel prep and clear liquid diet.    - PTA Hydrochlorothiazide d/ra     Hypertension  Blood pressure has been variable, overall reasonably controlled     > Continue PTA lisinopril  > d/ra hydrochlorothiazide      Hyperlipidemia  - resume PTA atorvastatin      Recurrent UTIs  Currently asymptomatic.  Continue PTA prophylactic trimethoprim.  (also received IV Zosyn  While here for diverticulitis)     COVID-19 PCR negative  Routine admission COVID-19 PCR testing was negative on 10/26/2021.  She received the Moderna COVID-19 vaccine on 1/17/2021 and 2/14/2021.     Code Status: No CPR- Do NOT Intubate       Clinically Significant Risk Factors Present on Admission      Disposition: home with home care PT per PT recs      ASSESSMENT      Enrollment  Primary Care Care Coordination Status: Declined    Discharge Assessment  How are you doing now that you are home?: Had diarrhea yesterday but is  feeling good today.  How are your symptoms? (Red Flag symptoms escalate to triage hotline per guidelines): Improved  Do you feel your condition is stable enough to be safe at home until your provider visit?: Yes  Does the patient have their discharge instructions? : Yes  Does the patient have questions regarding their discharge instructions? : No  Were you started on any new medications or were there changes to any of your previous medications? : Yes  Does the patient have all of their medications?: Yes  Do you have questions regarding any of your medications? : No  Do you have all of your needed medical supplies or equipment (DME)?  (i.e. oxygen tank, CPAP, cane, etc.): Yes  Discharge follow-up appointment scheduled within 14 calendar days? : No (Pt said she will schedule her own follow up)  Is patient agreeable to assistance with scheduling? :  (Pt said she will need to figure out transportation before seting up appointment)    Post-op ([facility] CTA Only)  If the patient had a surgery or procedure, do they have any questions for a nurse?: No         PLAN                                                      Outpatient Plan:   Follow up with primary care provider, Alex Whitten, within 7 days for   hospital follow- up.  The following labs/tests are recommended: Repeat   Hemoglobin.     Follow up with GI Dr Masters in 1-2 weeks or as suggested.     Return to Emergency if any concerns for worsening bleeding.    No future appointments.      For any urgent concerns, please contact our 24 hour nurse triage line: [phone] ([phone])         RIC Oh  [phone]  [facility]

## 2021-11-10 ENCOUNTER — TELEPHONE (OUTPATIENT)
Dept: NURSING | Facility: CLINIC | Age: 86
End: 2021-11-10
Payer: COMMERCIAL

## 2021-11-10 DIAGNOSIS — E87.6 HYPOKALEMIA: ICD-10-CM

## 2021-11-10 DIAGNOSIS — I10 ESSENTIAL HYPERTENSION: Primary | ICD-10-CM

## 2021-11-10 DIAGNOSIS — E87.1 HYPONATREMIA: ICD-10-CM

## 2021-11-10 NOTE — TELEPHONE ENCOUNTER
Pt was in hospital for 9 days. Discharged on 11/4/21.     Pt states she was reading discharge paperwork and it states to stop taking Hydrochlorothiazide. Pt reports she has no recollection of being told that and has been on it for years. Pt states she called surgeon's office and they aren't the ones who stopped it.     hydrochlorothiazide (HYDRODIURIL) 25 MG tablet Take 1 tablet (25 mg) by mouth daily     Pt looking for advice from primary. Pt asking if she can resume it or if there's a reason she is not to take it. Pt doesn't need refills of this medication, just instructions to take or discontinue.     RN will route message to Dr Whitten's team for advice.     Pt can be reached at [phone], ok to leave message with details per pt.    Barbi Arora RN   11/10/21 1:33 PM  [facility] Nurse Advisor   Statement Selected

## 2021-11-11 ENCOUNTER — TRANSFERRED RECORDS (OUTPATIENT)
Dept: HEALTH INFORMATION MANAGEMENT | Facility: CLINIC | Age: 86
End: 2021-11-11
Payer: COMMERCIAL

## 2021-11-11 ENCOUNTER — TELEPHONE (OUTPATIENT)
Dept: FAMILY MEDICINE | Facility: CLINIC | Age: 86
End: 2021-11-11
Payer: COMMERCIAL

## 2021-11-11 ENCOUNTER — MEDICAL CORRESPONDENCE (OUTPATIENT)
Dept: HEALTH INFORMATION MANAGEMENT | Facility: CLINIC | Age: 86
End: 2021-11-11
Payer: COMMERCIAL

## 2021-11-11 NOTE — TELEPHONE ENCOUNTER
Hospitalist stopped hydrochlorothiazide due to her low Na and K as copied below.  I may have her to be off of it for now, and doing close monitoring on BP and swelling. If worsening, then we would check  Lab and may consider resume them    thx            Hyponatremia, mild  - corrected/ supplemented; was noted drop to 132 from 136, potassium down to 3.2.  Likely GI losses from bowel prep and clear liquid diet.    - PTA Hydrochlorothiazide d/ra     Hypertension  Blood pressure has been variable, overall reasonably controlled     > Continue PTA lisinopril  > d/ra hydrochlorothiazide

## 2021-11-11 NOTE — TELEPHONE ENCOUNTER
S/w pt and gave Dr. Whitten's reply below.  Pt states understanding.    Pt states she will come in for lab sometime.    Please put orders for lab in chart.    Rocío CLEANING RN  EP Triage

## 2021-11-11 NOTE — TELEPHONE ENCOUNTER
Dr. Whitten    Home PT orders with 1 x a week for 1 week, then 2x a week for 3 weeks.     Verbal orders given orders [facility]..     Vivian Bennett RN

## 2021-11-12 ENCOUNTER — TELEPHONE (OUTPATIENT)
Dept: FAMILY MEDICINE | Facility: CLINIC | Age: 86
End: 2021-11-12
Payer: COMMERCIAL

## 2021-11-12 NOTE — TELEPHONE ENCOUNTER
Irena RICE with [facility] calling requesting delay start of care and pt was seen yesterday for PT/OT.  Received request from hospital but had wrong provider listed for pt.     Gave Dr. Whitten's reply below.    Irena can be reached at [phone]    Rocío CLEANING RN  EP Triage

## 2021-11-12 NOTE — TELEPHONE ENCOUNTER
Requesting delay in start of care orders for PT. Care was started yesterday.  They originally had an Mark provider listed as PCP.    [phone] - KRYSTAL Osborn RN

## 2021-11-12 NOTE — TELEPHONE ENCOUNTER
Call from MARY Andres with [facility].     Verbal order given for:     OT: Eval and treat     Routing to PCP as FYI:   Pt seen by orthopedic MD. Dx with Charcot foot. Placed in boot and has limited weight bearing restrictions.

## 2021-11-19 ENCOUNTER — TRANSFERRED RECORDS (OUTPATIENT)
Dept: HEALTH INFORMATION MANAGEMENT | Facility: CLINIC | Age: 86
End: 2021-11-19
Payer: COMMERCIAL

## 2021-11-23 ENCOUNTER — TELEPHONE (OUTPATIENT)
Dept: FAMILY MEDICINE | Facility: CLINIC | Age: 86
End: 2021-11-23
Payer: COMMERCIAL

## 2021-11-23 NOTE — TELEPHONE ENCOUNTER
Zeinab nurse with [facility] calling requesting an order for nursing evaluation.  Pt is in a cam boot 24 hours a day and now has developed a blister on the bottom of her foot.  Blister is about 1/4 inch wide.    Verbal orders for homecare given for above and per protocol.    Zeinab can be reached at [phone]    Rocío CLEANING RN  EP Triage

## 2021-11-24 NOTE — TELEPHONE ENCOUNTER
Call back from Liana. Received correct number for [facility].     Home Care Contact ([facility])    Attempt # 1    Left voicemail with provider message below. Advised to call back with further clarification.        Routing to Dr. Whitten to clarify. Per message below, okay to discontinue PT orders - but request was for OT orders.

## 2021-11-24 NOTE — TELEPHONE ENCOUNTER
Ot wanting to get okay to discontinue from HHA and OT since she is no longer needing the discharge on 11/30    Okay for early discharge?

## 2021-11-24 NOTE — TELEPHONE ENCOUNTER
If goals of PT and HHA fulfilled, they can stop. However, if she needs to resume after nurse assessment against blister, we should revise the decision       thx

## 2021-11-24 NOTE — TELEPHONE ENCOUNTER
Home Care Contact    Attempt # 1    Was call answered?  No.  Left message on voicemail with information to call triage back.    On call back:     - Relay provider message   - phone number for OT was not working, does she have alternative number?

## 2021-11-26 NOTE — TELEPHONE ENCOUNTER
Detailed message left with info from provider and return number to call with any questions or concerns.    Rocío CLEANING RN  EP Triage

## 2021-12-02 ENCOUNTER — TELEPHONE (OUTPATIENT)
Dept: FAMILY MEDICINE | Facility: CLINIC | Age: 86
End: 2021-12-02
Payer: COMMERCIAL

## 2021-12-02 NOTE — TELEPHONE ENCOUNTER
Jessica, with [facility] calling for verbal ok for homecare orders.     Requested orders:   Ongoing PT  1/wk x 5 weeks---starting next week.     OK'd requested orders.  Orders will be faxed to PCP for review and signature.   Nerissa Vences RN

## 2021-12-10 ENCOUNTER — TELEPHONE (OUTPATIENT)
Dept: FAMILY MEDICINE | Facility: CLINIC | Age: 86
End: 2021-12-10
Payer: COMMERCIAL

## 2021-12-10 NOTE — TELEPHONE ENCOUNTER
Reason for Call:  Form, our goal is to have forms completed with 72 hours, however, some forms may require a visit or additional information.    Type of letter, form or note:  Home Health Certification    Who is the form from?: Home care    Where did the form come from: form was faxed in    What clinic location was the form placed at?: [facility]    Where the form was placed: Form given to Araseli Miranda RN    What number is listed as a contact on the form?: NA       Additional comments: NA    Call taken on 12/10/2021 at 11:57 AM by Kaitlyn Bojorquez

## 2021-12-22 NOTE — TELEPHONE ENCOUNTER
MED REC DONE certification 11/11/21 to 1/9/22 [facility]    Discrepancies:    None    Meds on Epic but NOT  on Form:      Calcium carbonate (TUMS) 500 mg chewable tablet, take 1 chew tab by mouth 2 times daily as needed for heartburn    Meds on Form but NOT on Epic :                Pepto bismol 262 mg chewable tablet, 1-2 tablet every 4 hours PRN                  Tramadol 50 mg tablet, 1-2 tablet every 6 hours PRN severe pain    Routing to PCP for further review/recommendations/orders  Araseli Miranda RN

## 2021-12-22 NOTE — TELEPHONE ENCOUNTER
Discrepancies:     None     Meds on Epic but NOT  on Form:       Calcium carbonate (TUMS) 500 mg chewable tablet, take 1 chew tab by mouth 2 times daily as needed for heartburn     Meds on Form but NOT on Epic :                 Pepto bismol 262 mg chewable tablet, 1-2 tablet every 4 hours PRN                     Tramadol 50 mg tablet, 1-2 tablet every 6 hours PRN severe pain  ----> please check on if she is still taking tramadol???

## 2021-12-23 NOTE — TELEPHONE ENCOUNTER
Patient Contact    Attempt # 1    Was call answered?  No.  Did not receive vm so not able to leave message.    On call back:     -ask pt if she is taking tramadol.  See Dr. Whitten's message below.    Rocío CLEANING RN  EP Triage

## 2021-12-27 DIAGNOSIS — I10 ESSENTIAL HYPERTENSION: ICD-10-CM

## 2021-12-27 DIAGNOSIS — N39.0 RECURRENT UTI: ICD-10-CM

## 2021-12-27 DIAGNOSIS — E78.5 HYPERLIPIDEMIA LDL GOAL <130: ICD-10-CM

## 2021-12-27 RX ORDER — ATORVASTATIN CALCIUM 10 MG/1
10 TABLET, FILM COATED ORAL DAILY
Qty: 90 TABLET | Refills: 1 | Status: CANCELLED | OUTPATIENT
Start: 2021-12-27

## 2021-12-27 RX ORDER — LISINOPRIL 20 MG/1
20 TABLET ORAL DAILY
Qty: 90 TABLET | Refills: 1 | Status: CANCELLED | OUTPATIENT
Start: 2021-12-27

## 2021-12-27 NOTE — TELEPHONE ENCOUNTER
Failed protocol.  Should have refills on the atorvastatin and lisinopril  please route to  team if patient needs an appointment     Delia BAKER RN BSN  [facility]  [phone]

## 2021-12-28 RX ORDER — ATORVASTATIN CALCIUM 10 MG/1
TABLET, FILM COATED ORAL
Qty: 90 TABLET | Refills: 2 | Status: SHIPPED | OUTPATIENT
Start: 2021-12-28 | End: 2023-01-04

## 2021-12-28 RX ORDER — TRIMETHOPRIM 100 MG/1
TABLET ORAL
Qty: 90 TABLET | Refills: 0 | Status: SHIPPED | OUTPATIENT
Start: 2021-12-28 | End: 2022-03-09

## 2021-12-28 RX ORDER — TRIMETHOPRIM 100 MG/1
100 TABLET ORAL DAILY
Qty: 90 TABLET | Refills: 0 | Status: SHIPPED | OUTPATIENT
Start: 2021-12-28 | End: 2022-04-12

## 2021-12-28 NOTE — TELEPHONE ENCOUNTER
Prescription approved per [facility] Refill Protocol.    Rocío CLEANING RN  EP Triage

## 2021-12-28 NOTE — TELEPHONE ENCOUNTER
Routing refill request to provider for review/approval because:  Drug not on the [facility] refill protocol     Rocío CLEANING RN  EP Triage

## 2021-12-29 NOTE — TELEPHONE ENCOUNTER
S/w pt who states she is not taking the tramadol for severe pain but would like to have on hand if able for arthritis pain every once in awhile.      Pharmacy pended.    Pt can be reached at [phone],  States she will be moving to [place] and is going to look at the [facility] as her primary clinic.    Rocío CLEANING RN  EP Triage

## 2021-12-30 RX ORDER — BISMUTH SUBSALICYLATE 262 MG/1
TABLET, CHEWABLE ORAL
Status: ON HOLD | COMMUNITY
Start: 2021-12-30 | End: 2023-08-11

## 2021-12-30 NOTE — TELEPHONE ENCOUNTER
Tramadol removed from form until reordered by new PCP.  Form and Epic medication lists updated. Epic medication list printed and returned with form to Dr. Whitten. Araseli Miranda RN

## 2021-12-30 NOTE — TELEPHONE ENCOUNTER
Patient should discuss her continuing Tramadol with her new PCP when setting up the care.  Since it is not in her active med list, I will not prescribe at this time.      thx

## 2022-01-01 DIAGNOSIS — Z53.9 DIAGNOSIS NOT YET DEFINED: Primary | ICD-10-CM

## 2022-01-01 PROCEDURE — G0180 MD CERTIFICATION HHA PATIENT: HCPCS | Performed by: FAMILY MEDICINE

## 2022-01-06 NOTE — TELEPHONE ENCOUNTER
Home Health Certification completed and  faxed back to [facility] and sent to abstraction.  Kaitlyn Bojorquez,

## 2022-01-11 ENCOUNTER — TRANSFERRED RECORDS (OUTPATIENT)
Dept: HEALTH INFORMATION MANAGEMENT | Facility: CLINIC | Age: 87
End: 2022-01-11
Payer: COMMERCIAL

## 2022-02-01 ENCOUNTER — OFFICE VISIT (OUTPATIENT)
Dept: FAMILY MEDICINE | Facility: CLINIC | Age: 87
End: 2022-02-01
Payer: COMMERCIAL

## 2022-02-01 VITALS
WEIGHT: 150.2 LBS | HEIGHT: 62 IN | RESPIRATION RATE: 22 BRPM | BODY MASS INDEX: 27.64 KG/M2 | HEART RATE: 129 BPM | TEMPERATURE: 98.8 F | OXYGEN SATURATION: 97 % | SYSTOLIC BLOOD PRESSURE: 108 MMHG | DIASTOLIC BLOOD PRESSURE: 54 MMHG

## 2022-02-01 DIAGNOSIS — N18.30 STAGE 3 CHRONIC KIDNEY DISEASE, UNSPECIFIED WHETHER STAGE 3A OR 3B CKD (H): ICD-10-CM

## 2022-02-01 DIAGNOSIS — F32.0 CURRENT MILD EPISODE OF MAJOR DEPRESSIVE DISORDER WITHOUT PRIOR EPISODE (H): ICD-10-CM

## 2022-02-01 DIAGNOSIS — M25.50 MULTIPLE JOINT PAIN: Primary | ICD-10-CM

## 2022-02-01 DIAGNOSIS — R00.0 TACHYCARDIA: ICD-10-CM

## 2022-02-01 LAB
ERYTHROCYTE [DISTWIDTH] IN BLOOD BY AUTOMATED COUNT: 16.8 % (ref 10–15)
HCT VFR BLD AUTO: 36.1 % (ref 35–47)
HGB BLD-MCNC: 11.2 G/DL (ref 11.7–15.7)
MCH RBC QN AUTO: 24.2 PG (ref 26.5–33)
MCHC RBC AUTO-ENTMCNC: 31 G/DL (ref 31.5–36.5)
MCV RBC AUTO: 78 FL (ref 78–100)
PLATELET # BLD AUTO: 387 10E3/UL (ref 150–450)
RBC # BLD AUTO: 4.63 10E6/UL (ref 3.8–5.2)
WBC # BLD AUTO: 10.9 10E3/UL (ref 4–11)

## 2022-02-01 PROCEDURE — 82607 VITAMIN B-12: CPT | Performed by: PHYSICIAN ASSISTANT

## 2022-02-01 PROCEDURE — 80053 COMPREHEN METABOLIC PANEL: CPT | Performed by: PHYSICIAN ASSISTANT

## 2022-02-01 PROCEDURE — 99214 OFFICE O/P EST MOD 30 MIN: CPT | Performed by: PHYSICIAN ASSISTANT

## 2022-02-01 PROCEDURE — 85027 COMPLETE CBC AUTOMATED: CPT | Performed by: PHYSICIAN ASSISTANT

## 2022-02-01 PROCEDURE — 36415 COLL VENOUS BLD VENIPUNCTURE: CPT | Performed by: PHYSICIAN ASSISTANT

## 2022-02-01 PROCEDURE — 84443 ASSAY THYROID STIM HORMONE: CPT | Performed by: PHYSICIAN ASSISTANT

## 2022-02-01 PROCEDURE — 82728 ASSAY OF FERRITIN: CPT | Performed by: PHYSICIAN ASSISTANT

## 2022-02-01 RX ORDER — DULOXETIN HYDROCHLORIDE 20 MG/1
20 CAPSULE, DELAYED RELEASE ORAL 2 TIMES DAILY
Qty: 30 CAPSULE | Refills: 1 | Status: SHIPPED | OUTPATIENT
Start: 2022-02-01 | End: 2022-03-09

## 2022-02-01 RX ORDER — SODIUM PHOSPHATE,MONO-DIBASIC 19G-7G/118
1 ENEMA (ML) RECTAL
COMMUNITY
End: 2023-08-07

## 2022-02-01 ASSESSMENT — PATIENT HEALTH QUESTIONNAIRE - PHQ9
SUM OF ALL RESPONSES TO PHQ QUESTIONS 1-9: 10
SUM OF ALL RESPONSES TO PHQ QUESTIONS 1-9: 10
10. IF YOU CHECKED OFF ANY PROBLEMS, HOW DIFFICULT HAVE THESE PROBLEMS MADE IT FOR YOU TO DO YOUR WORK, TAKE CARE OF THINGS AT HOME, OR GET ALONG WITH OTHER PEOPLE: NOT DIFFICULT AT ALL

## 2022-02-01 ASSESSMENT — MIFFLIN-ST. JEOR: SCORE: 1078.52

## 2022-02-01 ASSESSMENT — ENCOUNTER SYMPTOMS: BACK PAIN: 1

## 2022-02-01 NOTE — PATIENT INSTRUCTIONS
(M25.50) Multiple joint pain  (primary encounter diagnosis)  Comment: well get labs and determine plan.  Start on cymbalta 1 tab daily   Plan: Vitamin B12, TSH with free T4 reflex, CBC with         platelets, Ferritin, DULoxetine (CYMBALTA) 20         MG capsule, Comprehensive metabolic panel            (F32.0) Current mild episode of major depressive disorder without prior episode (H)  Comment:   Plan: Vitamin B12, TSH with free T4 reflex, CBC with         platelets, Ferritin, DULoxetine (CYMBALTA) 20         MG capsule, Comprehensive metabolic panel            (N18.30) Stage 3 chronic kidney disease, unspecified whether stage 3a or 3b CKD (H)  Comment:   Plan: well recheck

## 2022-02-01 NOTE — LETTER
February 2, 2022      Floridalma Cunningham  [address] [address]  [place] [address]        Dear MsArnoldMarisa,    Please follow-up if symptoms fail to resolve or worsen.     As always, please let us know if you have questions.  Our clinic number is [phone]     My best,   Ramona Aaseby-Aguilera PA-C   [facility]       Resulted Orders   CBC with platelets   Result Value Ref Range    WBC Count 10.9 4.0 - 11.0 10e3/uL    RBC Count 4.63 3.80 - 5.20 10e6/uL    Hemoglobin 11.2 (L) 11.7 - 15.7 g/dL      Comment:      ROKP     Hematocrit 36.1 35.0 - 47.0 %    MCV 78 78 - 100 fL    MCH 24.2 (L) 26.5 - 33.0 pg    MCHC 31.0 (L) 31.5 - 36.5 g/dL    RDW 16.8 (H) 10.0 - 15.0 %    Platelet Count 387 150 - 450 10e3/uL       If you have any questions or concerns, please call the clinic at the number listed above.       Sincerely,      Ramona Ann Aaseby-Aguilera, PA-C

## 2022-02-01 NOTE — LETTER
February 3, 2022      Floridalma R Marisa  [address]  [place] [address]        Dear ,    We are writing to inform you of your test results.    Your recent labs are within acceptable limits along with lab result.       Please follow-up if symptoms fail to resolve or worsen.     As always, please let us know if you have questions.  Our clinic number is [phone]       Resulted Orders   Vitamin B12   Result Value Ref Range    Vitamin B12 354 193 - 986 pg/mL   TSH with free T4 reflex   Result Value Ref Range    TSH 0.82 0.40 - 4.00 mU/L   CBC with platelets   Result Value Ref Range    WBC Count 10.9 4.0 - 11.0 10e3/uL    RBC Count 4.63 3.80 - 5.20 10e6/uL    Hemoglobin 11.2 (L) 11.7 - 15.7 g/dL      Comment:      ROKP     Hematocrit 36.1 35.0 - 47.0 %    MCV 78 78 - 100 fL    MCH 24.2 (L) 26.5 - 33.0 pg    MCHC 31.0 (L) 31.5 - 36.5 g/dL    RDW 16.8 (H) 10.0 - 15.0 %    Platelet Count 387 150 - 450 10e3/uL   Ferritin   Result Value Ref Range    Ferritin 20 8 - 252 ng/mL   Comprehensive metabolic panel   Result Value Ref Range    Sodium 138 133 - 144 mmol/L    Potassium 4.4 3.4 - 5.3 mmol/L    Chloride 106 94 - 109 mmol/L    Carbon Dioxide (CO2) 26 20 - 32 mmol/L    Anion Gap 6 3 - 14 mmol/L    Urea Nitrogen 14 7 - 30 mg/dL    Creatinine 0.78 0.52 - 1.04 mg/dL    Calcium 9.2 8.5 - 10.1 mg/dL    Glucose 136 (H) 70 - 99 mg/dL    Alkaline Phosphatase 154 (H) 40 - 150 U/L    AST 27 0 - 45 U/L    ALT 30 0 - 50 U/L    Protein Total 6.5 (L) 6.8 - 8.8 g/dL    Albumin 2.9 (L) 3.4 - 5.0 g/dL    Bilirubin Total 0.4 0.2 - 1.3 mg/dL    GFR Estimate 74 >60 mL/min/1.73m2      Comment:      Effective December 21, 2021 eGFRcr in adults is calculated using the 2021 CKD-EPI creatinine equation which includes age and gender ( et al., NEJM, DOI: 10.1056/QBQIix2378737)       If you have any questions or concerns, please call the clinic at the number listed above.       Sincerely,      Ramona Ann Aaseby-Aguilera,  CLAUDIA

## 2022-02-01 NOTE — PROGRESS NOTES
Assessment & Plan     Multiple joint pain    - Vitamin B12  - TSH with free T4 reflex  - CBC with platelets  - Ferritin  - DULoxetine (CYMBALTA) 20 MG capsule; Take 1 capsule (20 mg) by mouth 2 times daily  - Comprehensive metabolic panel    Current mild episode of major depressive disorder without prior episode (H)    - Vitamin B12  - TSH with free T4 reflex  - CBC with platelets  - Ferritin  - DULoxetine (CYMBALTA) 20 MG capsule; Take 1 capsule (20 mg) by mouth 2 times daily  - Comprehensive metabolic panel    Stage 3 chronic kidney disease, unspecified whether stage 3a or 3b CKD (H)                 Depression Screening Follow Up    PHQ 2/1/2022   PHQ-9 Total Score 10   Q9: Thoughts of better off dead/self-harm past 2 weeks Not at all         Follow Up Actions Taken  Crisis resource information provided in After Visit Summary     See Patient Instructions    Return in about 4 weeks (around 3/1/2022) for depression/anxiety recheck.    Ramona Ann Aaseby-Aguilera, PA-C  [facility]    Nahomy Hedrick is a 86 year old who presents for the following health issues  accompanied by her daughter.    Floridalma is here with her daughter and has a chief complaint of over body pain.  Not just her back.  Her pain is chronic in nature.  the daughter is also stating that her mother seems to be quite depressed due to his pain.  She used to take curcumin for her body pain but had to stop after she developed diverticulitis and had a GI bleed.    She states that she thinks she has peripheral neuropathy neuropathy in her feet.  Been diagnosed with this.  She states that she does not have pain in her feet but she does have numbness and states they just feels that.    Back Pain     History of Present Illness       Back Pain:  She presents for follow up of back pain. Patient's back pain is a chronic problem.  Location of back pain:  Right buttock and left buttock  Description of back pain: other  Back pain spreads:  right buttocks and left buttocks    Since patient first noticed back pain, pain is: gradually worsening  Does back pain interfere with her job:  Not applicable      Mental Health Follow-up:  Patient presents to follow-up on Depression.Patient's depression since last visit has been:  Worse  The patient is having other symptoms associated with depression.      Any significant life events: health concerns  Patient is not feeling anxious or having panic attacks.  Patient has concerns about alcohol or drug use.     Social History  Tobacco Use    Smoking status: Former Smoker      Quit date: 1945      Years since quittin.7    Smokeless tobacco: Never Used    Tobacco comment: smoked for two years  Vaping Use    Vaping Use: Never used  Alcohol use: Yes    Comment: rarely  Drug use: No      Today's PHQ-9         PHQ-9 Total Score:     (P) 10   PHQ-9 Q9 Thoughts of better off dead/self-harm past 2 weeks :   (P) Not at all   Thoughts of suicide or self harm:      Self-harm Plan:        Self-harm Action:          Safety concerns for self or others:           She eats 0-1 servings of fruits and vegetables daily.She consumes 0 sweetened beverage(s) daily.She exercises with enough effort to increase her heart rate 9 or less minutes per day.  She exercises with enough effort to increase her heart rate 3 or less days per week.   She is taking medications regularly.       Chronic/Recurring Back Pain Follow Up      Where is your back pain located? (Select all that apply) low back bilateral    How would you describe your back pain?  dull ache, sharp, shooting and stabbing    Where does your back pain spread? the right and left buttock    Since your last clinic visit for back pain, how has your pain changed? unchanged    Does your back pain interfere with your job?  Not applicable    Since your last visit, have you tried any new treatment? No            Review of Systems   Musculoskeletal: Positive for back pain.     "    Objective    /54   Pulse (!) 129   Temp 98.8  F (37.1  C) (Oral)   Resp 22   Ht 1.581 m (5' 2.25\")   Wt 68.1 kg (150 lb 3.2 oz)   SpO2 97%   BMI 27.25 kg/m    Body mass index is 27.25 kg/m .  Physical Exam   GENERAL: healthy, alert and no distress  HENT: ear canals and TM's normal, nose and mouth without ulcers or lesions  RESP: lungs clear to auscultation - no rales, rhonchi or wheezes  CV: regular rate and rhythm, normal S1 S2, no S3 or S4, no murmur, click or rub, no peripheral edema and peripheral pulses strong  MS: no gross musculoskeletal defects noted, no edema  bilat pedal pulses intact  PSYCH: mentation appears normal, affect normal/bright              Answers for HPI/ROS submitted by the patient on 2/1/2022  If you checked off any problems, how difficult have these problems made it for you to do your work, take care of things at home, or get along with other people?: Not difficult at all  PHQ9 TOTAL SCORE: 10      "

## 2022-02-02 LAB
ALBUMIN SERPL-MCNC: 2.9 G/DL (ref 3.4–5)
ALP SERPL-CCNC: 154 U/L (ref 40–150)
ALT SERPL W P-5'-P-CCNC: 30 U/L (ref 0–50)
ANION GAP SERPL CALCULATED.3IONS-SCNC: 6 MMOL/L (ref 3–14)
AST SERPL W P-5'-P-CCNC: 27 U/L (ref 0–45)
BILIRUB SERPL-MCNC: 0.4 MG/DL (ref 0.2–1.3)
BUN SERPL-MCNC: 14 MG/DL (ref 7–30)
CALCIUM SERPL-MCNC: 9.2 MG/DL (ref 8.5–10.1)
CHLORIDE BLD-SCNC: 106 MMOL/L (ref 94–109)
CO2 SERPL-SCNC: 26 MMOL/L (ref 20–32)
CREAT SERPL-MCNC: 0.78 MG/DL (ref 0.52–1.04)
FERRITIN SERPL-MCNC: 20 NG/ML (ref 8–252)
GFR SERPL CREATININE-BSD FRML MDRD: 74 ML/MIN/1.73M2
GLUCOSE BLD-MCNC: 136 MG/DL (ref 70–99)
POTASSIUM BLD-SCNC: 4.4 MMOL/L (ref 3.4–5.3)
PROT SERPL-MCNC: 6.5 G/DL (ref 6.8–8.8)
SODIUM SERPL-SCNC: 138 MMOL/L (ref 133–144)
TSH SERPL DL<=0.005 MIU/L-ACNC: 0.82 MU/L (ref 0.4–4)
VIT B12 SERPL-MCNC: 354 PG/ML (ref 193–986)

## 2022-02-08 DIAGNOSIS — F32.0 CURRENT MILD EPISODE OF MAJOR DEPRESSIVE DISORDER WITHOUT PRIOR EPISODE (H): ICD-10-CM

## 2022-02-08 DIAGNOSIS — M25.50 MULTIPLE JOINT PAIN: ICD-10-CM

## 2022-02-10 RX ORDER — DULOXETIN HYDROCHLORIDE 20 MG/1
20 CAPSULE, DELAYED RELEASE ORAL 2 TIMES DAILY
Qty: 30 CAPSULE | Refills: 1 | OUTPATIENT
Start: 2022-02-10

## 2022-02-21 ENCOUNTER — TELEPHONE (OUTPATIENT)
Dept: FAMILY MEDICINE | Facility: CLINIC | Age: 87
End: 2022-02-21
Payer: COMMERCIAL

## 2022-02-21 NOTE — TELEPHONE ENCOUNTER
Pt stopped taking the duloxetine on Thursday, because while she was on it she had more c/o pain, and she was concerned because her urinary frequency decreased.     Pt states that joint pain has improved since stopping the medication. Pt also notes that she is not experiencing any symptoms of depression    Pt wanted to let PCP know that she stopped suddenly.  Pt has not noticed any withdrawal effects.  She is not interested in restarting medication.    She has appt 3/9/22.      Reba GORDILLO RN

## 2022-03-07 NOTE — PROGRESS NOTES
Pre-Visit Planning   Next 5 appointments (look out 90 days)    Mar 09, 2022  4:40 PM  (Arrive by 4:20 PM)  Provider Visit with Ramona Ann Aaseby-Aguilera, PA-C  [facility] ([facility] ) [address] [address] [address] [phone]        Appointment Notes for this encounter:   1 month follow up, Anxiety/depression    Questionnaires Reviewed/Assigned  No additional questionnaires are needed  Patient preferred phone number: [phone]  Contacted patient via phone/Go!Fotonhart. Are there any additional questions or concerns you'd like to review with your provider during your visit? No  Visit is not preventive.    Meds  Home Meds reviewed and updated    Entered patient-preferred pharmacy.     Current Outpatient Medications   Medication     acetaminophen (TYLENOL) 325 MG tablet     aspirin (ASA) 81 MG EC tablet     atorvastatin (LIPITOR) 10 MG tablet     bismuth subsalicylate (PEPTO BISMOL) 262 MG chewable tablet     calcium carbonate (TUMS) 500 MG chewable tablet     CRANBERRY PO     DULoxetine (CYMBALTA) 20 MG capsule     glucosamine-chondroitin 500-400 MG CAPS per capsule     lisinopril (ZESTRIL) 20 MG tablet     multivitamin w/minerals (MULTI-VITAMIN) tablet     Omega-3 Fatty Acids (FISH OIL PO)     polyethylene glycol (MIRALAX) 17 g packet     trimethoprim (TRIMPEX) 100 MG tablet     trimethoprim (TRIMPEX) 100 MG tablet     No current facility-administered medications for this visit.       Health Maintenance   Health Maintenance Due   Topic Date Due     DEPRESSION ACTION PLAN  Never done     ZOSTER IMMUNIZATION (2 of 3) 04/09/2009       Health Maintenance reviewed and Health Maintenance orders pended    Go!Fotonhart  Patient is not active on Go!Fotonhart. Encouraged Go!Fotonhart activation.    Call Summary  Advised patient to call back at [phone] if needed.  Reba GORDILLO RN

## 2022-03-09 ENCOUNTER — OFFICE VISIT (OUTPATIENT)
Dept: FAMILY MEDICINE | Facility: CLINIC | Age: 87
End: 2022-03-09
Payer: COMMERCIAL

## 2022-03-09 VITALS
WEIGHT: 150 LBS | DIASTOLIC BLOOD PRESSURE: 68 MMHG | RESPIRATION RATE: 14 BRPM | HEART RATE: 97 BPM | BODY MASS INDEX: 27.6 KG/M2 | HEIGHT: 62 IN | TEMPERATURE: 98.7 F | SYSTOLIC BLOOD PRESSURE: 103 MMHG

## 2022-03-09 DIAGNOSIS — G89.29 OTHER CHRONIC PAIN: Primary | ICD-10-CM

## 2022-03-09 PROCEDURE — 99214 OFFICE O/P EST MOD 30 MIN: CPT | Performed by: PHYSICIAN ASSISTANT

## 2022-03-09 RX ORDER — GABAPENTIN 100 MG/1
100 CAPSULE ORAL 3 TIMES DAILY
Qty: 90 CAPSULE | Refills: 0 | Status: SHIPPED | OUTPATIENT
Start: 2022-03-09 | End: 2022-04-12

## 2022-03-09 ASSESSMENT — PATIENT HEALTH QUESTIONNAIRE - PHQ9
SUM OF ALL RESPONSES TO PHQ QUESTIONS 1-9: 8
SUM OF ALL RESPONSES TO PHQ QUESTIONS 1-9: 8
10. IF YOU CHECKED OFF ANY PROBLEMS, HOW DIFFICULT HAVE THESE PROBLEMS MADE IT FOR YOU TO DO YOUR WORK, TAKE CARE OF THINGS AT HOME, OR GET ALONG WITH OTHER PEOPLE: VERY DIFFICULT

## 2022-03-09 NOTE — PATIENT INSTRUCTIONS
(G89.29) Other chronic pain  (primary encounter diagnosis)  Comment:   Plan: gabapentin (NEURONTIN) 100 MG capsule            Can take up to 3 times daily

## 2022-03-09 NOTE — PROGRESS NOTES
Assessment & Plan     Other chronic pain  States has never been on gabapentin for pain.  Well try low dose 100 mg up to TID.  Follow-up in 1 month and may consider higher dosing if tolerated.   - gabapentin (NEURONTIN) 100 MG capsule; Take 1 capsule (100 mg) by mouth 3 times daily    [id number]}         Return in about 4 weeks (around 4/6/2022) for med check.    Ramona Ann Aaseby-Aguilera, PA-C [facility]    Nahomy Hedrick is a 86 year old who presents for the following health issues     Floridalma was seen 1 month ago for depression and chronic pain and was put on Cymbalta.  She stated she took that for about 2 weeks and did not feel like it was doing anything for her pain and she states she does not feel like she is depressed so she stopped taking it.  However her chronic joint pain has gotten much worse in the last few weeks.  She was given a narcotic at 1 point but understands that that is not the way to treat chronic pain.  She has never taken gabapentin and never been to a pain clinic calling    History of Present Illness       Back Pain:  She presents for follow up of back pain. Patient's back pain is a chronic problem.  Location of back pain:  Right shoulder, left shoulder, right buttock and left buttock  Description of back pain: burning  Back pain spreads: right buttocks and left buttocks    Since patient first noticed back pain, pain is: unchanged  Does back pain interfere with her job:  Not applicable      Mental Health Follow-up:                    Today's PHQ-9         PHQ-9 Total Score: 8  PHQ-9 Q9 Thoughts of better off dead/self-harm past 2 weeks :   (P) Not at all    How difficult have these problems made it for you to do your work, take care of things at home, or get along with other people: Very difficult        Reason for visit:  Pain in leg , butt, feet  Symptom onset:  More than a month  Symptom intensity:  Moderate  Symptom progression:  Staying the same  Had these symptoms  "before:  Yes  Has tried/received treatment for these symptoms:  Yes  Previous treatment was successful:  No    She eats 0-1 servings of fruits and vegetables daily.She consumes 0 sweetened beverage(s) daily.She exercises with enough effort to increase her heart rate 9 or less minutes per day.  She exercises with enough effort to increase her heart rate 3 or less days per week.   She is taking medications regularly.           Review of Systems   Constitutional, HEENT, cardiovascular, pulmonary, gi and gu systems are negative, except as otherwise noted.      Objective    /68 (BP Location: Right arm, Patient Position: Chair)   Pulse 97   Temp 98.7  F (37.1  C) (Oral)   Resp 14   Ht 1.581 m (5' 2.25\")   Wt 68 kg (150 lb)   Breastfeeding No   BMI 27.22 kg/m    Body mass index is 27.22 kg/m .  Physical Exam   GENERAL: healthy, alert and no distress  RESP: lungs clear to auscultation - no rales, rhonchi or wheezes  CV: regular rate and rhythm, normal S1 S2, no S3 or S4, no murmur, click or rub, no peripheral edema and peripheral pulses strong  PSYCH: mentation appears normal, anxious and fatigued                "

## 2022-03-09 NOTE — LETTER
My Depression Action Plan  Name: Floridalma Cunningham   Date of Birth 1935  Date: 3/9/2022    My doctor: Aaseby-Aguilera, Ramona Ann   My clinic: [facility]  [id number] [place] [address] [phone]          GREEN    ZONE   Good Control    What it looks like:     Things are going generally well. You have normal ups and downs. You may even feel depressed from time to time, but bad moods usually last less than a day.   What you need to do:  1. Continue to care for yourself (see self care plan)  2. Check your depression survival kit and update it as needed  3. Follow your physician s recommendations including any medication.  4. Do not stop taking medication unless you consult with your physician first.           YELLOW         ZONE Getting Worse    What it looks like:     Depression is starting to interfere with your life.     It may be hard to get out of bed; you may be starting to isolate yourself from others.    Symptoms of depression are starting to last most all day and this has happened for several days.     You may have suicidal thoughts but they are not constant.   What you need to do:     1. Call your care team. Your response to treatment will improve if you keep your care team informed of your progress. Yellow periods are signs an adjustment may need to be made.     2. Continue your self-care.  Just get dressed and ready for the day.  Don't give yourself time to talk yourself out of it.    3. Talk to someone in your support network.    4. Open up your Depression Self-Care Plan/Wellness Kit.           RED    ZONE Medical Alert - Get Help    What it looks like:     Depression is seriously interfering with your life.     You may experience these or other symptoms: You can t get out of bed most days, can t work or engage in other necessary activities, you have trouble taking care of basic hygiene, or basic responsibilities, thoughts of suicide or death that  will not go away, self-injurious behavior.     What you need to do:  1. Call your care team and request a same-day appointment. If they are not available (weekends or after hours) call your local crisis line, emergency room or 911.          Depression Self-Care Plan / Wellness Kit    Many people find that medication and therapy are helpful treatments for managing depression. In addition, making small changes to your everyday life can help to boost your mood and improve your wellbeing. Below are some tips for you to consider. Be sure to talk with your medical provider and/or behavioral health consultant if your symptoms are worsening or not improving.     Sleep   Sleep hygiene  means all of the habits that support good, restful sleep. It includes maintaining a consistent bedtime and wake time, using your bedroom only for sleeping or sex, and keeping the bedroom dark and free of distractions like a computer, smartphone, or television.     Develop a Healthy Routine  Maintain good hygiene. Get out of bed in the morning, make your bed, brush your teeth, take a shower, and get dressed. Don t spend too much time viewing media that makes you feel stressed. Find time to relax each day.    Exercise  Get some form of exercise every day. This will help reduce pain and release endorphins, the  feel good  chemicals in your brain. It can be as simple as just going for a walk or doing some gardening, anything that will get you moving.      Diet  Strive to eat healthy foods, including fruits and vegetables. Drink plenty of water. Avoid excessive sugar, caffeine, alcohol, and other mood-altering substances.     Stay Connected with Others  Stay in touch with friends and family members.    Manage Your Mood  Try deep breathing, massage therapy, biofeedback, or meditation. Take part in fun activities when you can. Try to find something to smile about each day.     Psychotherapy  Be open to working with a therapist if your provider  recommends it.     Medication  Be sure to take your medication as prescribed. Most anti-depressants need to be taken every day. It usually takes several weeks for medications to work. Not all medicines work for all people. It is important to follow-up with your provider to make sure you have a treatment plan that is working for you. Do not stop your medication abruptly without first discussing it with your provider.    Crisis Resources   These hotlines are for both adults and children. They and are open 24 hours a day, 7 days a week unless noted otherwise.      National Suicide Prevention Lifeline   1-402-128-SJWO (2371)      Crisis Text Line    www.crisistextline.org  Text HOME to 763012 from anywhere in the [place], anytime, about any type of crisis. A live, trained crisis counselor will receive the text and respond quickly.      [facility] Lifeline for LGBTQ Youth  A national crisis intervention and suicide lifeline for LGBTQ youth under 25. Provides a safe place to talk without judgement. Call 1-439.764.8895; text START to 587362 or visit www.thetrevorproject.org to talk to a trained counselor.      For Critical access hospital crisis numbers, visit the [facility] website at:  [place]://[place].gov/dhs/people-we-serve/adults/health-care/mental-health/resources/crisis-contacts.jsp

## 2022-03-09 NOTE — LETTER
My Depression Action Plan  Name: Floridalma Cunningham   Date of Birth 1935  Date: 3/9/2022    My doctor: Aaseby-Aguilera, Ramona Ann   My clinic: [facility]  [id number] [place] [id number] [phone]          GREEN    ZONE   Good Control    What it looks like:     Things are going generally well. You have normal ups and downs. You may even feel depressed from time to time, but bad moods usually last less than a day.   What you need to do:  1. Continue to care for yourself (see self care plan)  2. Check your depression survival kit and update it as needed  3. Follow your physician s recommendations including any medication.  4. Do not stop taking medication unless you consult with your physician first.           YELLOW         ZONE Getting Worse    What it looks like:     Depression is starting to interfere with your life.     It may be hard to get out of bed; you may be starting to isolate yourself from others.    Symptoms of depression are starting to last most all day and this has happened for several days.     You may have suicidal thoughts but they are not constant.   What you need to do:     1. Call your care team. Your response to treatment will improve if you keep your care team informed of your progress. Yellow periods are signs an adjustment may need to be made.     2. Continue your self-care.  Just get dressed and ready for the day.  Don't give yourself time to talk yourself out of it.    3. Talk to someone in your support network.    4. Open up your Depression Self-Care Plan/Wellness Kit.           RED    ZONE Medical Alert - Get Help    What it looks like:     Depression is seriously interfering with your life.     You may experience these or other symptoms: You can t get out of bed most days, can t work or engage in other necessary activities, you have trouble taking care of basic hygiene, or basic responsibilities, thoughts of suicide or death that  will not go away, self-injurious behavior.     What you need to do:  1. Call your care team and request a same-day appointment. If they are not available (weekends or after hours) call your local crisis line, emergency room or 911.          Depression Self-Care Plan / Wellness Kit    Many people find that medication and therapy are helpful treatments for managing depression. In addition, making small changes to your everyday life can help to boost your mood and improve your wellbeing. Below are some tips for you to consider. Be sure to talk with your medical provider and/or behavioral health consultant if your symptoms are worsening or not improving.     Sleep   Sleep hygiene  means all of the habits that support good, restful sleep. It includes maintaining a consistent bedtime and wake time, using your bedroom only for sleeping or sex, and keeping the bedroom dark and free of distractions like a computer, smartphone, or television.     Develop a Healthy Routine  Maintain good hygiene. Get out of bed in the morning, make your bed, brush your teeth, take a shower, and get dressed. Don t spend too much time viewing media that makes you feel stressed. Find time to relax each day.    Exercise  Get some form of exercise every day. This will help reduce pain and release endorphins, the  feel good  chemicals in your brain. It can be as simple as just going for a walk or doing some gardening, anything that will get you moving.      Diet  Strive to eat healthy foods, including fruits and vegetables. Drink plenty of water. Avoid excessive sugar, caffeine, alcohol, and other mood-altering substances.     Stay Connected with Others  Stay in touch with friends and family members.    Manage Your Mood  Try deep breathing, massage therapy, biofeedback, or meditation. Take part in fun activities when you can. Try to find something to smile about each day.     Psychotherapy  Be open to working with a therapist if your provider  recommends it.     Medication  Be sure to take your medication as prescribed. Most anti-depressants need to be taken every day. It usually takes several weeks for medications to work. Not all medicines work for all people. It is important to follow-up with your provider to make sure you have a treatment plan that is working for you. Do not stop your medication abruptly without first discussing it with your provider.    Crisis Resources   These hotlines are for both adults and children. They and are open 24 hours a day, 7 days a week unless noted otherwise.      National Suicide Prevention Lifeline   9-757-936-XWVZ (0627)      Crisis Text Line    www.crisistextline.org  Text HOME to 162050 from anywhere in the [place], anytime, about any type of crisis. A live, trained crisis counselor will receive the text and respond quickly.      [facility] Lifeline for LGBTQ Youth  A national crisis intervention and suicide lifeline for LGBTQ youth under 25. Provides a safe place to talk without judgement. Call 1-549.112.9626; text START to 429917 or visit www.thetrevorproject.org to talk to a trained counselor.      For [facility] crisis numbers, visit the [facility] website at:  https://[place].gov/dhs/people-we-serve/adults/health-care/mental-health/resources/crisis-contacts.jsp

## 2022-03-10 ASSESSMENT — PATIENT HEALTH QUESTIONNAIRE - PHQ9: SUM OF ALL RESPONSES TO PHQ QUESTIONS 1-9: 8

## 2022-04-11 RX ORDER — LISINOPRIL 20 MG/1
20 TABLET ORAL DAILY
Qty: 90 TABLET | Refills: 1 | Status: CANCELLED | OUTPATIENT
Start: 2022-04-11

## 2022-04-12 ENCOUNTER — OFFICE VISIT (OUTPATIENT)
Dept: FAMILY MEDICINE | Facility: CLINIC | Age: 87
End: 2022-04-12
Payer: COMMERCIAL

## 2022-04-12 VITALS
OXYGEN SATURATION: 98 % | HEIGHT: 62 IN | TEMPERATURE: 98.7 F | DIASTOLIC BLOOD PRESSURE: 58 MMHG | RESPIRATION RATE: 16 BRPM | WEIGHT: 153.2 LBS | BODY MASS INDEX: 28.19 KG/M2 | HEART RATE: 98 BPM | SYSTOLIC BLOOD PRESSURE: 98 MMHG

## 2022-04-12 DIAGNOSIS — I10 ESSENTIAL HYPERTENSION: ICD-10-CM

## 2022-04-12 DIAGNOSIS — N39.0 RECURRENT UTI: ICD-10-CM

## 2022-04-12 DIAGNOSIS — G89.29 OTHER CHRONIC PAIN: ICD-10-CM

## 2022-04-12 PROCEDURE — 99214 OFFICE O/P EST MOD 30 MIN: CPT | Performed by: PHYSICIAN ASSISTANT

## 2022-04-12 RX ORDER — LISINOPRIL 10 MG/1
10 TABLET ORAL DAILY
Qty: 30 TABLET | Refills: 1 | Status: SHIPPED | OUTPATIENT
Start: 2022-04-12 | End: 2022-06-06

## 2022-04-12 RX ORDER — GABAPENTIN 100 MG/1
100 CAPSULE ORAL 3 TIMES DAILY
Qty: 90 CAPSULE | Refills: 1 | Status: SHIPPED | OUTPATIENT
Start: 2022-04-12 | End: 2023-08-07

## 2022-04-12 RX ORDER — TRIMETHOPRIM 100 MG/1
100 TABLET ORAL DAILY
Qty: 90 TABLET | Refills: 3 | Status: SHIPPED | OUTPATIENT
Start: 2022-04-12 | End: 2023-08-07

## 2022-04-12 NOTE — PROGRESS NOTES
"  Assessment & Plan     Essential hypertension  Floridalma has had very low blood pressure the last few times she has been in.  She has also lost 40 pounds since last year.  Advised to cut the lisinopril back to 10 mg and follow-up in 1 month.  - lisinopril (ZESTRIL) 10 MG tablet; Take 1 tablet (10 mg) by mouth daily    Other chronic pain  Since Floridalma has more pain at night while she is in bed encouraged her to take up to 300 mg at bedtime.  She states that she does not feel like she has a lot of pain during the day.  - gabapentin (NEURONTIN) 100 MG capsule; Take 1 capsule (100 mg) by mouth 3 times daily    Recurrent UTI  Pt request    - trimethoprim (TRIMPEX) 100 MG tablet; Take 1 tablet (100 mg) by mouth daily      BMI:   Estimated body mass index is 27.8 kg/m  as calculated from the following:    Height as of this encounter: 1.581 m (5' 2.25\").    Weight as of this encounter: 69.5 kg (153 lb 3.2 oz).           Return in about 4 weeks (around 5/10/2022) for BP Recheck.    Ramona Ann Aaseby-Aguilera, PA-C  [facility]    Nahomy Hedrick is a 86 year old who presents for the following health issues      Floridalma is here with her daughter.  She was seen 1 month ago for painful feet and was put on gabapentin 100 mg 3 times daily.  She states that she is sleeping better at night but also sleeping quite a bit during the day and does not really notice any relief during the day but states that her pain does not typically get bad until she is in bed at night.    History of Present Illness       Reason for visit:  Numbness in feet, pain in butt and shoulder  Symptom onset:  More than a month  Symptoms include:  Numbness in feet, pain in butt and shoulder  Symptom intensity:  Moderate  Symptom progression:  Staying the same  Had these symptoms before:  Yes  Has tried/received treatment for these symptoms:  Yes  Previous treatment was successful:  No  What makes it worse:  Walking a lot  What makes it better:  " "No    She eats 2-3 servings of fruits and vegetables daily.She consumes 0 sweetened beverage(s) daily.She exercises with enough effort to increase her heart rate 10 to 19 minutes per day.  She exercises with enough effort to increase her heart rate 3 or less days per week.   She is taking medications regularly.         Review of Systems   Constitutional, HEENT, cardiovascular, pulmonary, gi and gu systems are negative, except as otherwise noted.      Objective    BP 98/58   Pulse 98   Temp 98.7  F (37.1  C) (Oral)   Resp 16   Ht 1.581 m (5' 2.25\")   Wt 69.5 kg (153 lb 3.2 oz)   SpO2 98%   BMI 27.80 kg/m    Body mass index is 27.8 kg/m .  Physical Exam   GENERAL: healthy, alert and no distress  RESP: lungs clear to auscultation - no rales, rhonchi or wheezes  CV: regular rate and rhythm, normal S1 S2, no S3 or S4, no murmur, click or rub, no peripheral edema and peripheral pulses strong  PSYCH: mentation appears normal, affect normal/bright              "

## 2022-04-12 NOTE — PATIENT INSTRUCTIONS
(I10) Essential hypertension  Comment: bp is always low.  Advised to cut back from 20 mg to 10 mg and follow-up in a month   Plan: lisinopril (ZESTRIL) 10 MG tablet            (G89.29) Other chronic pain  Comment: advised to take 300 mg before bed as that is when pain is worse.   Plan: gabapentin (NEURONTIN) 100 MG capsule            (N39.0) Recurrent UTI  Comment:   Plan: trimethoprim (TRIMPEX) 100 MG tablet

## 2022-05-05 DIAGNOSIS — I10 ESSENTIAL HYPERTENSION: ICD-10-CM

## 2022-05-05 RX ORDER — LISINOPRIL 10 MG/1
10 TABLET ORAL DAILY
Qty: 30 TABLET | Refills: 1 | OUTPATIENT
Start: 2022-05-05

## 2022-05-18 ENCOUNTER — TELEPHONE (OUTPATIENT)
Dept: FAMILY MEDICINE | Facility: CLINIC | Age: 87
End: 2022-05-18
Payer: COMMERCIAL

## 2022-05-18 NOTE — TELEPHONE ENCOUNTER
Reason for Call:  Form, our goal is to have forms completed with 72 hours, however, some forms may require a visit or additional information.    Type of letter, form or note:  medical    Who is the form from?: [facility] (if other please explain)    Where did the form come from: form was faxed in    What clinic location was the form placed at?: [facility]     Where the form was placed: [employer]Folder    What number is listed as a contact on the form?: [phone]       Additional comments: please sign/date. Fax back to [phone]    Call taken on 5/18/2022 at 7:42 AM by Jenny Toledo MA

## 2022-05-22 ENCOUNTER — NURSE TRIAGE (OUTPATIENT)
Dept: NURSING | Facility: CLINIC | Age: 87
End: 2022-05-22
Payer: COMMERCIAL

## 2022-05-22 NOTE — TELEPHONE ENCOUNTER
"Patient calling - says she developed symptoms of a bladder infection last night.  Says she cannot control her bladder.  Is having urinary incontinence.  Also feels a \"little itchy\" in the vaginal area.    No fever.  No pain or burning with urination.    Triaged to disposition of See Physician Within 24 Hours.  Patient intends to go to Urgent Care.  Advised to call back if symptoms worsen.    Mikaela Charles RN  Triage Nurse Advisor    Reason for Disposition    [1] Can't control passage of urine (i.e., urinary incontinence) AND [2] new onset (< 2 weeks) or worsening    Additional Information    Negative: Shock suspected (e.g., cold/pale/clammy skin, too weak to stand, low BP, rapid pulse)    Negative: Sounds like a life-threatening emergency to the triager    Negative: Followed a genital area injury    Negative: Followed a genital area injury (penis, scrotum)    Negative: Vaginal discharge    Negative: Pus (white, yellow) or bloody discharge from end of penis    Negative: [1] Taking antibiotic for urinary tract infection (UTI) AND [2] female    Negative: [1] Taking antibiotic for urinary tract infection (UTI) AND [2] male    Negative: [1] Discomfort (pain, burning or stinging) when passing urine AND [2] pregnant    Negative: [1] Discomfort (pain, burning or stinging) when passing urine AND [2] postpartum (from 0 to 6 weeks after delivery)    Negative: [1] Discomfort (pain, burning or stinging) when passing urine AND [2] female    Negative: [1] Discomfort (pain, burning or stinging) when passing urine AND [2] male    Negative: Pain or itching in the vulvar area    Negative: Pain in scrotum is main symptom    Negative: Blood in the urine is main symptom    Negative: Symptoms arising from use of a urinary catheter (Jackson or Coude)    Negative: [1] Unable to urinate (or only a few drops) > 4 hours AND     [2] bladder feels very full (e.g., palpable bladder or strong urge to urinate)    Negative: [1] Decreased urination and " [2] drinking very little AND [2] dehydration suspected (e.g., dark urine, no urine > 12 hours, very dry mouth, very lightheaded)    Negative: Patient sounds very sick or weak to the triager    Negative: Fever > 100.4 F (38.0 C)    Protocols used: URINARY SYMPTOMS-A-AH

## 2022-05-25 ENCOUNTER — OFFICE VISIT (OUTPATIENT)
Dept: URGENT CARE | Facility: URGENT CARE | Age: 87
End: 2022-05-25
Payer: COMMERCIAL

## 2022-05-25 ENCOUNTER — ALLIED HEALTH/NURSE VISIT (OUTPATIENT)
Dept: FAMILY MEDICINE | Facility: CLINIC | Age: 87
End: 2022-05-25
Payer: COMMERCIAL

## 2022-05-25 VITALS
HEART RATE: 91 BPM | SYSTOLIC BLOOD PRESSURE: 120 MMHG | WEIGHT: 155 LBS | TEMPERATURE: 99.1 F | BODY MASS INDEX: 28.12 KG/M2 | DIASTOLIC BLOOD PRESSURE: 74 MMHG | OXYGEN SATURATION: 98 %

## 2022-05-25 VITALS — DIASTOLIC BLOOD PRESSURE: 58 MMHG | SYSTOLIC BLOOD PRESSURE: 92 MMHG

## 2022-05-25 DIAGNOSIS — N30.90 CYSTITIS: Primary | ICD-10-CM

## 2022-05-25 DIAGNOSIS — I10 ESSENTIAL HYPERTENSION: Primary | ICD-10-CM

## 2022-05-25 DIAGNOSIS — R30.0 DYSURIA: ICD-10-CM

## 2022-05-25 LAB
ALBUMIN UR-MCNC: ABNORMAL MG/DL
APPEARANCE UR: CLEAR
BACTERIA #/AREA URNS HPF: ABNORMAL /HPF
BILIRUB UR QL STRIP: ABNORMAL
COLOR UR AUTO: YELLOW
GLUCOSE UR STRIP-MCNC: NEGATIVE MG/DL
HGB UR QL STRIP: ABNORMAL
KETONES UR STRIP-MCNC: 15 MG/DL
LEUKOCYTE ESTERASE UR QL STRIP: ABNORMAL
NITRATE UR QL: NEGATIVE
PH UR STRIP: 6 [PH] (ref 5–7)
RBC #/AREA URNS AUTO: ABNORMAL /HPF
SP GR UR STRIP: 1.01 (ref 1–1.03)
SQUAMOUS #/AREA URNS AUTO: ABNORMAL /LPF
UROBILINOGEN UR STRIP-ACNC: 1 E.U./DL
WBC #/AREA URNS AUTO: ABNORMAL /HPF

## 2022-05-25 PROCEDURE — 99207 PR NO CHARGE NURSE ONLY: CPT

## 2022-05-25 PROCEDURE — 81001 URINALYSIS AUTO W/SCOPE: CPT

## 2022-05-25 PROCEDURE — 87086 URINE CULTURE/COLONY COUNT: CPT | Performed by: PHYSICIAN ASSISTANT

## 2022-05-25 PROCEDURE — 99214 OFFICE O/P EST MOD 30 MIN: CPT | Performed by: PHYSICIAN ASSISTANT

## 2022-05-25 RX ORDER — CEPHALEXIN 500 MG/1
500 CAPSULE ORAL 2 TIMES DAILY
Qty: 14 CAPSULE | Refills: 0 | Status: SHIPPED | OUTPATIENT
Start: 2022-05-25 | End: 2022-06-01

## 2022-05-25 RX ORDER — NITROFURANTOIN 25; 75 MG/1; MG/1
CAPSULE ORAL
COMMUNITY
Start: 2022-05-22 | End: 2022-06-07

## 2022-05-25 NOTE — PROGRESS NOTES
Floridalma Cunningham is a 87 year old patient who comes in today for a Blood Pressure check.  Initial BP:  BP 92/58      Disposition: pt was going to  in [place] for a bladder infection that would not go away.  Mckenna Keene, [facility]

## 2022-05-25 NOTE — PROGRESS NOTES
Assessment & Plan     Dysuria    - UA macro with reflex to Microscopic and Culture - Clinc Collect  - Urine Microscopic Exam  - Urine Culture    Cystitis  Culture pending.    Stop macrobid and start keflex.    Will call with results if change is needed or no growth.      - cephALEXin (KEFLEX) 500 MG capsule  Dispense: 14 capsule; Refill: 0     Return for Follow up with primary care provider 3-5 days.  If not improving as expected     Deann Abbott PA-C  [facility] LETTY Hedrick is a 87 year old female who presents to clinic today for the following health issues:  Chief Complaint   Patient presents with     Urgent Care     Urinary Problem X5 Days   Had virtual visit for UTI symptoms and was prescribed nitrofurantoin 100 mg q12h, now experiencing more frequent urination, no pain, slight itching, feels like this medication is not working       HPI  Pt with hx of chronic / intermittant uti, on trimethoprim prophylactically, presents with concern re: uti.    incontinent at night.  No current dysuria.    No frequency day time.    No incontinence day time.    No abdomen pain or back pain.   No fevers or chills.    No vaginal discharge.    On trimethoprim for uti prophylaxis    Currently on macrobid which was obtained from a virtual visit form another provider.        Review of Systems  Constitutional, HEENT, cardiovascular, pulmonary, gi and gu systems are negative, except as otherwise noted.      Objective    /74   Pulse 91   Temp 99.1  F (37.3  C)   Wt 70.3 kg (155 lb)   SpO2 98%   BMI 28.12 kg/m    Physical Exam   Patient is in no acute distress and appears well.  No costovertebral angle tenderness is present.  Abdomen is soft nontender to light or deep palpation no masses or hepatosplenomegaly present.      Results for orders placed or performed in visit on 05/25/22   UA macro with reflex to Microscopic and Culture - Clinc Collect     Status: Abnormal    Specimen:  Urine, Midstream   Result Value Ref Range    Color Urine Yellow Colorless, Straw, Light Yellow, Yellow    Appearance Urine Clear Clear    Glucose Urine Negative Negative mg/dL    Bilirubin Urine Small (A) Negative    Ketones Urine 15  (A) Negative mg/dL    Specific Gravity Urine 1.015 1.003 - 1.035    Blood Urine Trace (A) Negative    pH Urine 6.0 5.0 - 7.0    Protein Albumin Urine Trace (A) Negative mg/dL    Urobilinogen Urine 1.0 0.2, 1.0 E.U./dL    Nitrite Urine Negative Negative    Leukocyte Esterase Urine Large (A) Negative   Urine Microscopic Exam     Status: Abnormal   Result Value Ref Range    Bacteria Urine Many (A) None Seen /HPF    RBC Urine 0-2 0-2 /HPF /HPF    WBC Urine  (A) 0-5 /HPF /HPF    Squamous Epithelials Urine Few (A) None Seen /LPF

## 2022-05-25 NOTE — PATIENT INSTRUCTIONS
try the new antibiotic.    We will call with results of culture if change is needed or no sign of infection.    Follow up with your regular doctor for persistent or worsening symptoms or not improved with the above plan.

## 2022-05-27 LAB — BACTERIA UR CULT: NORMAL

## 2022-06-01 ENCOUNTER — TELEPHONE (OUTPATIENT)
Dept: FAMILY MEDICINE | Facility: CLINIC | Age: 87
End: 2022-06-01
Payer: COMMERCIAL

## 2022-06-01 DIAGNOSIS — N34.2 INFECTIVE URETHRITIS: Primary | ICD-10-CM

## 2022-06-01 RX ORDER — CIPROFLOXACIN 500 MG/1
500 TABLET, FILM COATED ORAL 2 TIMES DAILY
Qty: 6 TABLET | Refills: 0 | Status: SHIPPED | OUTPATIENT
Start: 2022-06-01 | End: 2022-06-04

## 2022-06-01 NOTE — TELEPHONE ENCOUNTER
Pt calling in regarding cephALEXin (KEFLEX) 500 MG capsule given to pt on 5/25/22 at . Pt was dx with UTI, sx were improving initial and now sx are increasing again (frequency, pain with urination). Pt is requesting additional medications or alternative med. Pt was unable to schedule with PCP until July, soonest available with a provider was 6/7/22. Please review and advise.     Kayy RODRIGUEZ RN

## 2022-06-01 NOTE — TELEPHONE ENCOUNTER
Called and spoke with pt, relayed provider message below. Pt verbalized understanding and no further questions.     Kayy RODRIGUEZ RN

## 2022-06-01 NOTE — TELEPHONE ENCOUNTER
I sent in cipro 500 mg to take bid x 3 days.  If this fails to refief symptosm she will need to follow-up . Please let her know

## 2022-06-02 DIAGNOSIS — I10 ESSENTIAL HYPERTENSION: ICD-10-CM

## 2022-06-06 RX ORDER — LISINOPRIL 10 MG/1
10 TABLET ORAL DAILY
Qty: 30 TABLET | Refills: 1 | Status: SHIPPED | OUTPATIENT
Start: 2022-06-06 | End: 2022-08-15

## 2022-06-06 NOTE — PROGRESS NOTES
Pre-Visit Planning   Next 5 appointments (look out 90 days)    Jun 07, 2022  4:00 PM  (Arrive by 3:40 PM)  Provider Visit with Bhavesh Livingston DO  [facility] ([facility] ) [address] [facility] [address] [phone]        Appointment Notes for this encounter:  bp review neuropathy -gabapentin not help  ?SB3    Questionnaires Reviewed/Assigned No additional questionnaires are needed     Unable to reach. Left voicemail. Advised patient to call clinic back at [phone]    Sally Robles RN   .

## 2022-06-07 ENCOUNTER — OFFICE VISIT (OUTPATIENT)
Dept: FAMILY MEDICINE | Facility: CLINIC | Age: 87
End: 2022-06-07
Payer: COMMERCIAL

## 2022-06-07 VITALS
HEART RATE: 88 BPM | BODY MASS INDEX: 27.03 KG/M2 | SYSTOLIC BLOOD PRESSURE: 98 MMHG | WEIGHT: 149 LBS | RESPIRATION RATE: 20 BRPM | DIASTOLIC BLOOD PRESSURE: 69 MMHG | OXYGEN SATURATION: 96 %

## 2022-06-07 DIAGNOSIS — I10 ESSENTIAL HYPERTENSION: ICD-10-CM

## 2022-06-07 DIAGNOSIS — G89.29 OTHER CHRONIC PAIN: ICD-10-CM

## 2022-06-07 DIAGNOSIS — Z23 ENCOUNTER FOR VACCINATION: ICD-10-CM

## 2022-06-07 DIAGNOSIS — G57.93 NEUROPATHY OF BOTH FEET: ICD-10-CM

## 2022-06-07 PROCEDURE — 0064A COVID-19,PF,MODERNA (18+ YRS BOOSTER .25ML): CPT | Performed by: FAMILY MEDICINE

## 2022-06-07 PROCEDURE — 99214 OFFICE O/P EST MOD 30 MIN: CPT | Mod: 25 | Performed by: FAMILY MEDICINE

## 2022-06-07 PROCEDURE — 91306 COVID-19,PF,MODERNA (18+ YRS BOOSTER .25ML): CPT | Performed by: FAMILY MEDICINE

## 2022-06-07 NOTE — PROGRESS NOTES
{PROVIDER CHARTING PREFERENCE:965405}    Subjective   Floridalma is a 87 year old who presents for the following health issues {ACCOMPANIED BY STATEMENT (Optional):142543}    HPI     {SUPERLIST (Optional):295052}  {additonal problems for provider to add (Optional):295898}    Review of Systems   {ROS COMP (Optional):775625}      Objective    BP 98/69 (BP Location: Right arm, Patient Position: Chair, Cuff Size: Adult Large)   Pulse 88   Resp 20   Wt 67.6 kg (149 lb)   SpO2 96%   Breastfeeding No   BMI 27.03 kg/m    Body mass index is 27.03 kg/m .  Physical Exam   {Exam List (Optional):897727}    {Diagnostic Test Results (Optional):976199}    {AMBULATORY ATTESTATION (Optional):940925}   Prior to immunization administration, verified patients identity using patient s name and date of birth. Please see Immunization Activity for additional information.     Screening Questionnaire for Adult Immunization    Are you sick today?   No   Do you have allergies to medications, food, a vaccine component or latex?   No   Have you ever had a serious reaction after receiving a vaccination?   No   Do you have a long-term health problem with heart, lung, kidney, or metabolic disease (e.g., diabetes), asthma, a blood disorder, no spleen, complement component deficiency, a cochlear implant, or a spinal fluid leak?  Are you on long-term aspirin therapy?   No   Do you have cancer, leukemia, HIV/AIDS, or any other immune system problem?   No   Do you have a parent, brother, or sister with an immune system problem?   No   In the past 3 months, have you taken medications that affect  your immune system, such as prednisone, other steroids, or anticancer drugs; drugs for the treatment of rheumatoid arthritis, Crohn s disease, or psoriasis; or have you had radiation treatments?   No   Have you had a seizure, or a brain or other nervous system problem?   No   During the past year, have you received a transfusion of blood or blood    products,  or been given immune (gamma) globulin or antiviral drug?   No   For women: Are you pregnant or is there a chance you could become       pregnant during the next month?   No   Have you received any vaccinations in the past 4 weeks?   No     Immunization questionnaire answers were all negative.        Per orders of Dr. Livingston, injection of COVID booster given by Marilee Arizmendi MA. Patient instructed to remain in clinic for 15 minutes afterwards, and to report any adverse reaction to me immediately.       Screening performed by Marilee Arizmendi MA on 6/7/2022 at 4:36 PM.

## 2022-06-07 NOTE — PATIENT INSTRUCTIONS
You can continue to take the gabapentin as 1 capsule before bed only. Let me know if you have any further questions or concerns.

## 2022-06-07 NOTE — PROGRESS NOTES
"  Assessment & Plan   See after visit summary for helpful information and advice given to patient.    Neuropathy of both feet      Other chronic pain      Essential hypertension      Encounter for vaccination    - COVID-19,PF,MODERNA (18+ YRS BOOSTER .25ML)               BMI:   Estimated body mass index is 27.03 kg/m  as calculated from the following:    Height as of 4/12/22: 1.581 m (5' 2.25\").    Weight as of this encounter: 67.6 kg (149 lb).           Return in about 3 months (around 9/7/2022) for Routine preventive, with any available provider, with primary provider.    Bhavesh Livingston [facility] RALPH Hedrick is a 87 year old who presents for the following health issues  accompanied by her daughter.    HPI     Medication Followup of neuropathy    Taking Medication as prescribed: yes    Side Effects:  Causing fatigue    Medication Helping Symptoms:  NO         Review of Systems   Patient is seen for management of several conditions and medication management.    Patient took a dose of gabapentin before bed last night, and felt it relieved some pain.     She completely stopped taking gabapentin about a week ago due to excessive drowsiness.     She is interested in my idea of taking gabapentin nightly to help relieve her lower extremity discomfort, and deferred taking it during the day due to the side effect of drowsiness.    No significant foot pain at exam.     Does not feel dizzy now after lowering lisinopril dose from 20 to 10 mg.         Objective    BP 98/69 (BP Location: Right arm, Patient Position: Chair, Cuff Size: Adult Large)   Pulse 88   Resp 20   Wt 67.6 kg (149 lb)   SpO2 96%   Breastfeeding No   BMI 27.03 kg/m    Body mass index is 27.03 kg/m .  Physical Exam   Vital signs reviewed.  Patient is in no acute appearing distress.  Breathing appears nonlabored.  Patient is alert and oriented ×3.  Patient is very pleasant, making good eye contact and responding with " clear fluent speech.    Heart: Heart rate is regular without murmur.    Lungs: Lungs are clear to auscultation with good airflow bilaterally.    Skin/extremities: Warm and dry, with no lower leg edema.    Diabetic foot exam shows skin to be intact and smooth.  Sensory testing with filament shows normal bilateral sensation.  Patient has a normal bilateral dorsalis pedis pulse.

## 2022-07-05 RX ORDER — LISINOPRIL 20 MG/1
TABLET ORAL
Qty: 90 TABLET | OUTPATIENT
Start: 2022-07-05

## 2022-07-05 NOTE — TELEPHONE ENCOUNTER
6/7/22 OV notes:  Does not feel dizzy now after lowering lisinopril dose from 20 to 10 mg.     Med list up to date.      Rx Lisinopril 20 mg refused and dose update sent to pharmacy.     Nerissa MINA, RN      July 5, 2022  11:23 AM

## 2022-07-18 ENCOUNTER — TELEPHONE (OUTPATIENT)
Dept: FAMILY MEDICINE | Facility: CLINIC | Age: 87
End: 2022-07-18

## 2022-07-18 NOTE — PROGRESS NOTES
Pre-Visit Planning   Next 5 appointments (look out 90 days)    Jul 20, 2022 11:30 AM  (Arrive by 11:10 AM)  Provider Visit with Bhavesh Livingston DO  [facility] ([facility] ) [address] [facility] [address] [phone]        Appointment Notes for this encounter:      Med check - Gabapentin not working  Send link to [phone].    Questionnaires Reviewed/Assigned  No additional questionnaires are needed        Contacted patient via phone/[facility] [employer]. Are there any additional questions or concerns you'd like to review with your provider during your visit? No    Visit is not preventive.    Meds  Home Meds reviewed and updated    Entered patient-preferred pharmacy.     Current Outpatient Medications   Medication     acetaminophen (TYLENOL) 325 MG tablet     aspirin (ASA) 81 MG EC tablet     atorvastatin (LIPITOR) 10 MG tablet     bismuth subsalicylate (PEPTO BISMOL) 262 MG chewable tablet     calcium carbonate (TUMS) 500 MG chewable tablet     CRANBERRY PO     gabapentin (NEURONTIN) 100 MG capsule     glucosamine-chondroitin 500-400 MG CAPS per capsule     lisinopril (ZESTRIL) 10 MG tablet     multivitamin w/minerals (THERA-VIT-M) tablet     Omega-3 Fatty Acids (FISH OIL PO)     trimethoprim (TRIMPEX) 100 MG tablet     No current facility-administered medications for this visit.     [facility] [employer]  Patient is not active on [facility].     Call Summary  Advised patient to call back at [phone] if needed.     Cindy West RN/Sally Robles RN

## 2022-07-18 NOTE — TELEPHONE ENCOUNTER
Reason for call:  Other   Patient called regarding (reason for call): appointment  Additional comments: Follow up to appointment 6/7/22, medication not working, would like to discuss other medication options. Telephone visit preferred. Would like an appointment prior to 8/12 if possible. Please call patient to schedule    Phone number to reach patient:  Home number on file [phone] (home)    Best Time:  any    Can we leave a detailed message on this number?  YES    Travel screening: Not Applicable

## 2022-07-18 NOTE — TELEPHONE ENCOUNTER
"Called and spoke with pt. regarding Gabapentin. Patient states \"I just don't feel like this is doing anything\". Patient reports having to lay in bed for about 45 minutes prior to getting out of bed in the morning due to the pain and then once she is up, she feels \"pretty good\".    Scheduled pt virtual appt. with PT for 07/20/22 @ 11:10 to discuss further options.    Cindy West RN/Sally Robles RN    "

## 2022-07-20 ENCOUNTER — VIRTUAL VISIT (OUTPATIENT)
Dept: FAMILY MEDICINE | Facility: CLINIC | Age: 87
End: 2022-07-20
Payer: COMMERCIAL

## 2022-07-20 DIAGNOSIS — G57.93 NEUROPATHY OF BOTH FEET: Primary | ICD-10-CM

## 2022-07-20 DIAGNOSIS — M25.50 MULTIPLE JOINT PAIN: ICD-10-CM

## 2022-07-20 PROCEDURE — 99213 OFFICE O/P EST LOW 20 MIN: CPT | Mod: 95 | Performed by: FAMILY MEDICINE

## 2022-07-20 RX ORDER — PREGABALIN 50 MG/1
50 CAPSULE ORAL 2 TIMES DAILY
Qty: 60 CAPSULE | Refills: 1 | Status: SHIPPED | OUTPATIENT
Start: 2022-07-20 | End: 2023-08-07

## 2022-07-20 NOTE — PROGRESS NOTES
"Floridalma is a 87 year old who is being evaluated via a billable video visit.      How would you like to obtain your AVS? Mail a copy  If the video visit is dropped, the invitation should be resent by: Text to cell phone: [phone]  Will anyone else be joining your video visit? No          Assessment & Plan   See after visit summary for helpful information and advice given to patient.    Neuropathy of both feet    - pregabalin (LYRICA) 50 MG capsule  Dispense: 60 capsule; Refill: 1    Multiple joint pain    - pregabalin (LYRICA) 50 MG capsule  Dispense: 60 capsule; Refill: 1               BMI:   Estimated body mass index is 27.03 kg/m  as calculated from the following:    Height as of 4/12/22: 1.581 m (5' 2.25\").    Weight as of 6/7/22: 67.6 kg (149 lb).           No follow-ups on file.    Bhavesh Livingston DO  [facility]    Nahomy Hedrick is a 87 year old, presenting for the following health issues:  Medication Change      HPI         Review of Systems   Patient is seen via video visit to address persistent neuropathy in her feet.    She does not feel the gabapentin is helping her leg pain, and pain in other areas of body being worse at night. She will have pain in buttocks, knees, and ankles.     The pain is not keeping her awake at night.     She asked about taking tramadol, which her neighbor is taking.  I recommended trying Lyrica first.  I cautioned her that tramadol can cause significant side effects especially in patients her age.    She is willing to try the Lyrica by itself before considering tramadol also.         Objective           Vitals:  No vitals were obtained today due to virtual visit.    Physical Exam   GENERAL: Healthy, alert and no distress  EYES: Eyes grossly normal to inspection.  No discharge or erythema, or obvious scleral/conjunctival abnormalities.  RESP: No audible wheeze, cough, or visible cyanosis.  No visible retractions or increased work of breathing.    SKIN: " Visible skin clear. No significant rash, abnormal pigmentation or lesions.  NEURO: Cranial nerves grossly intact.  Mentation and speech appropriate for age.  PSYCH: Mentation appears normal, affect normal/bright, judgement and insight intact, normal speech and appearance well-groomed.                Video-Visit Details    Video Start Time: 11:30 AM    Type of service:  Video Visit    Video End Time:11:50 AM    Originating Location (pt. Location): Home    Distant Location (provider location):  [facility]     Platform used for Video Visit: [employer]    Arnold Barrett.

## 2022-07-25 ENCOUNTER — TELEPHONE (OUTPATIENT)
Dept: FAMILY MEDICINE | Facility: CLINIC | Age: 87
End: 2022-07-25

## 2022-07-25 DIAGNOSIS — M25.50 MULTIPLE JOINT PAIN: ICD-10-CM

## 2022-07-25 DIAGNOSIS — G57.93 NEUROPATHY OF BOTH FEET: Primary | ICD-10-CM

## 2022-07-25 NOTE — TELEPHONE ENCOUNTER
Floridalma says she had video visit with  last week and they discussed starting her on Tramadol. She has thought this over and would like to start it. Rx can be sent to [facility] in [place].      please review for prescribing.     Thank you,      Mikaela Browne R.N.

## 2022-07-25 NOTE — PATIENT INSTRUCTIONS
Please contact me if any adverse effects from the Lyrica prescribed today, otherwise known as pregabalin.  I hope it helps!

## 2022-07-26 RX ORDER — TRAMADOL HYDROCHLORIDE 50 MG/1
50 TABLET ORAL 2 TIMES DAILY PRN
Qty: 30 TABLET | Refills: 0 | Status: SHIPPED | OUTPATIENT
Start: 2022-07-26 | End: 2023-08-07

## 2022-07-28 ENCOUNTER — TELEPHONE (OUTPATIENT)
Dept: FAMILY MEDICINE | Facility: CLINIC | Age: 87
End: 2022-07-28

## 2022-07-28 NOTE — TELEPHONE ENCOUNTER
Reason for Call:  Form, our goal is to have forms completed with 72 hours, however, some forms may require a visit or additional information.    Type of letter, form or note:  medical    Who is the form from?: [facility] (if other please explain)    Where did the form come from: form was faxed in    What clinic location was the form placed at?: [facility]     Where the form was placed: [employer] [facility]Folder    What number is listed as a contact on the form?: [phone]       Additional comments: fax back to [phone]    Call taken on 7/28/2022 at 3:34 PM by Jenny Toledo MA

## 2022-08-11 ENCOUNTER — TELEPHONE (OUTPATIENT)
Dept: FAMILY MEDICINE | Facility: CLINIC | Age: 87
End: 2022-08-11

## 2022-08-11 NOTE — TELEPHONE ENCOUNTER
Reason for Call:  Form, our goal is to have forms completed with 72 hours, however, some forms may require a visit or additional information.    Type of letter, form or note:  medical    Who is the form from?: [facility]    Where did the form come from: form was faxed in    What clinic location was the form placed at?: [facility]     Where the form was placed: [employer]    What number is listed as a contact on the form?: [phone]       Additional comments: fax back to [phone]    Call taken on 8/11/2022 at 1:34 PM by Jenny Toledo MA

## 2022-09-19 ENCOUNTER — TELEPHONE (OUTPATIENT)
Dept: FAMILY MEDICINE | Facility: CLINIC | Age: 87
End: 2022-09-19

## 2022-09-19 NOTE — TELEPHONE ENCOUNTER
Patient calling and stated she is taking Lyrica and is not noticing any change in her symptoms.  Patient would like to stop the medication.  Please advise, thanks.

## 2022-09-20 NOTE — TELEPHONE ENCOUNTER
Pt notified of below if has any issues with coming off medication will call back to ONEIL Robles RN

## 2022-09-20 NOTE — TELEPHONE ENCOUNTER
Pt transferred to triage regarding message below. Pt is debating est care with a provider at assisted living facility, does not want to schedule at this point as she may be switching providers, would like to know if she can stop medication or needs to wean medication. Will speak with facility to see when she can be seen with their doc, will route to PCP to review and advise.     Kayy RODRIGUEZ RN

## 2022-09-20 NOTE — TELEPHONE ENCOUNTER
Spoke with patient, she would like to know if she can just stop med as she is thinking about seeing a doctor who comes into her facility where she lives. She was transferred to triage.  Jenny Toledo,

## 2022-10-25 ENCOUNTER — LAB REQUISITION (OUTPATIENT)
Dept: LAB | Facility: CLINIC | Age: 87
End: 2022-10-25
Payer: COMMERCIAL

## 2022-10-25 DIAGNOSIS — I10 ESSENTIAL (PRIMARY) HYPERTENSION: ICD-10-CM

## 2022-10-25 DIAGNOSIS — E56.8 DEFICIENCY OF OTHER VITAMINS: ICD-10-CM

## 2022-10-25 DIAGNOSIS — E78.2 MIXED HYPERLIPIDEMIA: ICD-10-CM

## 2022-10-31 LAB
CHOLEST SERPL-MCNC: 138 MG/DL
DEPRECATED CALCIDIOL+CALCIFEROL SERPL-MC: 47 UG/L (ref 20–75)
HDLC SERPL-MCNC: 46 MG/DL
HGB BLD-MCNC: 11 G/DL (ref 11.7–15.7)
LDLC SERPL CALC-MCNC: 67 MG/DL
NONHDLC SERPL-MCNC: 92 MG/DL
TRIGL SERPL-MCNC: 124 MG/DL

## 2022-10-31 PROCEDURE — 85018 HEMOGLOBIN: CPT | Mod: ORL

## 2022-10-31 PROCEDURE — 36415 COLL VENOUS BLD VENIPUNCTURE: CPT | Mod: ORL

## 2022-10-31 PROCEDURE — P9603 ONE-WAY ALLOW PRORATED MILES: HCPCS | Mod: ORL

## 2022-10-31 PROCEDURE — 80061 LIPID PANEL: CPT | Mod: ORL

## 2022-10-31 PROCEDURE — 82306 VITAMIN D 25 HYDROXY: CPT | Mod: ORL

## 2022-11-22 ENCOUNTER — TELEPHONE (OUTPATIENT)
Dept: FAMILY MEDICINE | Facility: CLINIC | Age: 87
End: 2022-11-22

## 2022-11-22 NOTE — TELEPHONE ENCOUNTER
Called to pt to schedule AWV and follow up     She is now seeing house provider at [facility]    ,Sally Robles RN

## 2022-12-30 DIAGNOSIS — E78.5 HYPERLIPIDEMIA LDL GOAL <130: ICD-10-CM

## 2023-01-04 RX ORDER — ATORVASTATIN CALCIUM 10 MG/1
TABLET, FILM COATED ORAL
Qty: 90 TABLET | Refills: 2 | Status: SHIPPED | OUTPATIENT
Start: 2023-01-04

## 2023-01-04 NOTE — TELEPHONE ENCOUNTER
Script refill faxed. Remind pt to do follow up for annual  wellness exam  check and labs, since she is past due.

## 2023-01-05 NOTE — TELEPHONE ENCOUNTER
Spoke to pt, she is no longer and EP pt, she has transferred care to [place]. Advised pt to contact pharmacy and let them know..Cirilo MACIEL, CMA

## 2023-03-28 ENCOUNTER — TELEPHONE (OUTPATIENT)
Dept: FAMILY MEDICINE | Facility: CLINIC | Age: 88
End: 2023-03-28
Payer: COMMERCIAL

## 2023-03-28 NOTE — TELEPHONE ENCOUNTER
Patient Quality Outreach    Patient is due for the following:   Physical Annual Wellness Visit    Next Steps:   Schedule a Annual Wellness Visit    Type of outreach:    Phone, spoke to patient/parent. Declines apt. She see's a provider at her home and will no longer see our provider for services.      Questions for provider review:    None     Makayla Arroyo [facility]

## 2023-08-06 ENCOUNTER — HOSPITAL ENCOUNTER (INPATIENT)
Facility: CLINIC | Age: 88
LOS: 4 days | Discharge: NURSING, REHAB OR LONG-TERM CARE | DRG: 871 | End: 2023-08-11
Attending: EMERGENCY MEDICINE | Admitting: HOSPITALIST
Payer: COMMERCIAL

## 2023-08-06 DIAGNOSIS — K92.2 ACUTE UPPER GI BLEED: ICD-10-CM

## 2023-08-06 DIAGNOSIS — A41.9 SEPSIS WITH ENCEPHALOPATHY WITHOUT SEPTIC SHOCK, DUE TO UNSPECIFIED ORGANISM (H): ICD-10-CM

## 2023-08-06 DIAGNOSIS — R65.20 SEPSIS WITH ENCEPHALOPATHY WITHOUT SEPTIC SHOCK, DUE TO UNSPECIFIED ORGANISM (H): ICD-10-CM

## 2023-08-06 DIAGNOSIS — G93.41 SEPSIS WITH ENCEPHALOPATHY WITHOUT SEPTIC SHOCK, DUE TO UNSPECIFIED ORGANISM (H): ICD-10-CM

## 2023-08-06 DIAGNOSIS — R41.0 ACUTE DELIRIUM: ICD-10-CM

## 2023-08-06 LAB — GLUCOSE BLDC GLUCOMTR-MCNC: 110 MG/DL (ref 70–99)

## 2023-08-06 PROCEDURE — 96376 TX/PRO/DX INJ SAME DRUG ADON: CPT

## 2023-08-06 PROCEDURE — 99291 CRITICAL CARE FIRST HOUR: CPT | Mod: 25

## 2023-08-06 PROCEDURE — 96375 TX/PRO/DX INJ NEW DRUG ADDON: CPT

## 2023-08-06 PROCEDURE — 93005 ELECTROCARDIOGRAM TRACING: CPT

## 2023-08-06 PROCEDURE — 82962 GLUCOSE BLOOD TEST: CPT

## 2023-08-06 PROCEDURE — 96365 THER/PROPH/DIAG IV INF INIT: CPT

## 2023-08-06 RX ORDER — IOPAMIDOL 755 MG/ML
500 INJECTION, SOLUTION INTRAVASCULAR ONCE
Status: COMPLETED | OUTPATIENT
Start: 2023-08-06 | End: 2023-08-07

## 2023-08-06 ASSESSMENT — ACTIVITIES OF DAILY LIVING (ADL): ADLS_ACUITY_SCORE: 33

## 2023-08-07 ENCOUNTER — APPOINTMENT (OUTPATIENT)
Dept: CT IMAGING | Facility: CLINIC | Age: 88
DRG: 871 | End: 2023-08-07
Attending: EMERGENCY MEDICINE
Payer: COMMERCIAL

## 2023-08-07 ENCOUNTER — ANESTHESIA EVENT (OUTPATIENT)
Dept: SURGERY | Facility: CLINIC | Age: 88
DRG: 871 | End: 2023-08-07
Payer: COMMERCIAL

## 2023-08-07 ENCOUNTER — ANESTHESIA (OUTPATIENT)
Dept: SURGERY | Facility: CLINIC | Age: 88
DRG: 871 | End: 2023-08-07
Payer: COMMERCIAL

## 2023-08-07 PROBLEM — R41.0 ACUTE DELIRIUM: Status: ACTIVE | Noted: 2023-08-07

## 2023-08-07 PROBLEM — R65.20 SEPSIS WITH ENCEPHALOPATHY WITHOUT SEPTIC SHOCK, DUE TO UNSPECIFIED ORGANISM (H): Status: ACTIVE | Noted: 2023-08-07

## 2023-08-07 PROBLEM — K92.2 ACUTE UPPER GI BLEED: Status: ACTIVE | Noted: 2023-08-07

## 2023-08-07 PROBLEM — A41.9 SEPSIS WITH ENCEPHALOPATHY WITHOUT SEPTIC SHOCK, DUE TO UNSPECIFIED ORGANISM (H): Status: ACTIVE | Noted: 2023-08-07

## 2023-08-07 PROBLEM — G93.41 SEPSIS WITH ENCEPHALOPATHY WITHOUT SEPTIC SHOCK, DUE TO UNSPECIFIED ORGANISM (H): Status: ACTIVE | Noted: 2023-08-07

## 2023-08-07 LAB
ABO/RH(D): NORMAL
ALBUMIN SERPL BCG-MCNC: 3.3 G/DL (ref 3.5–5.2)
ALBUMIN SERPL BCG-MCNC: 3.4 G/DL (ref 3.5–5.2)
ALBUMIN UR-MCNC: NEGATIVE MG/DL
ALP SERPL-CCNC: 68 U/L (ref 35–104)
ALP SERPL-CCNC: 83 U/L (ref 35–104)
ALT SERPL W P-5'-P-CCNC: 24 U/L (ref 0–50)
ALT SERPL W P-5'-P-CCNC: 25 U/L (ref 0–50)
ANION GAP SERPL CALCULATED.3IONS-SCNC: 11 MMOL/L (ref 7–15)
ANION GAP SERPL CALCULATED.3IONS-SCNC: 17 MMOL/L (ref 7–15)
ANTIBODY SCREEN: NEGATIVE
APPEARANCE UR: CLEAR
APTT PPP: 26 SECONDS (ref 22–38)
AST SERPL W P-5'-P-CCNC: 26 U/L (ref 0–45)
AST SERPL W P-5'-P-CCNC: 35 U/L (ref 0–45)
ATRIAL RATE - MUSE: 110 BPM
BACTERIA #/AREA URNS HPF: ABNORMAL /HPF
BASOPHILS # BLD AUTO: 0 10E3/UL (ref 0–0.2)
BASOPHILS NFR BLD AUTO: 0 %
BILIRUB DIRECT SERPL-MCNC: <0.2 MG/DL (ref 0–0.3)
BILIRUB SERPL-MCNC: 0.4 MG/DL
BILIRUB SERPL-MCNC: 0.5 MG/DL
BILIRUB UR QL STRIP: NEGATIVE
BLD PROD TYP BPU: NORMAL
BLD PROD TYP BPU: NORMAL
BLOOD COMPONENT TYPE: NORMAL
BLOOD COMPONENT TYPE: NORMAL
BUN SERPL-MCNC: 50.2 MG/DL (ref 8–23)
BUN SERPL-MCNC: 79.1 MG/DL (ref 8–23)
CALCIUM SERPL-MCNC: 8.5 MG/DL (ref 8.8–10.2)
CALCIUM SERPL-MCNC: 9.8 MG/DL (ref 8.8–10.2)
CHLORIDE SERPL-SCNC: 103 MMOL/L (ref 98–107)
CHLORIDE SERPL-SCNC: 115 MMOL/L (ref 98–107)
CODING SYSTEM: NORMAL
CODING SYSTEM: NORMAL
COLOR UR AUTO: ABNORMAL
CREAT SERPL-MCNC: 0.68 MG/DL (ref 0.51–0.95)
CREAT SERPL-MCNC: 0.77 MG/DL (ref 0.51–0.95)
CROSSMATCH: NORMAL
CROSSMATCH: NORMAL
DEPRECATED HCO3 PLAS-SCNC: 20 MMOL/L (ref 22–29)
DEPRECATED HCO3 PLAS-SCNC: 21 MMOL/L (ref 22–29)
DIASTOLIC BLOOD PRESSURE - MUSE: NORMAL MMHG
EOSINOPHIL # BLD AUTO: 0 10E3/UL (ref 0–0.7)
EOSINOPHIL NFR BLD AUTO: 0 %
ERYTHROCYTE [DISTWIDTH] IN BLOOD BY AUTOMATED COUNT: 15.7 % (ref 10–15)
GFR SERPL CREATININE-BSD FRML MDRD: 74 ML/MIN/1.73M2
GFR SERPL CREATININE-BSD FRML MDRD: 83 ML/MIN/1.73M2
GLUCOSE BLDC GLUCOMTR-MCNC: 83 MG/DL (ref 70–99)
GLUCOSE BLDC GLUCOMTR-MCNC: 92 MG/DL (ref 70–99)
GLUCOSE SERPL-MCNC: 108 MG/DL (ref 70–99)
GLUCOSE SERPL-MCNC: 82 MG/DL (ref 70–99)
GLUCOSE UR STRIP-MCNC: NEGATIVE MG/DL
HCO3 BLDV-SCNC: 22 MMOL/L (ref 21–28)
HCT VFR BLD AUTO: 28.5 % (ref 35–47)
HEMOCCULT STL QL: POSITIVE
HGB BLD-MCNC: 7 G/DL (ref 11.7–15.7)
HGB BLD-MCNC: 8.6 G/DL (ref 11.7–15.7)
HGB BLD-MCNC: 9.1 G/DL (ref 11.7–15.7)
HGB UR QL STRIP: ABNORMAL
IMM GRANULOCYTES # BLD: 0.1 10E3/UL
IMM GRANULOCYTES NFR BLD: 1 %
INR PPP: 1.29 (ref 0.85–1.15)
INTERPRETATION ECG - MUSE: NORMAL
ISSUE DATE AND TIME: NORMAL
KETONES UR STRIP-MCNC: ABNORMAL MG/DL
LACTATE BLD-SCNC: 4.8 MMOL/L
LACTATE SERPL-SCNC: 2.5 MMOL/L (ref 0.7–2)
LACTATE SERPL-SCNC: 3.8 MMOL/L (ref 0.7–2)
LEUKOCYTE ESTERASE UR QL STRIP: ABNORMAL
LIPASE SERPL-CCNC: 24 U/L (ref 13–60)
LYMPHOCYTES # BLD AUTO: 2.3 10E3/UL (ref 0.8–5.3)
LYMPHOCYTES NFR BLD AUTO: 12 %
MAGNESIUM SERPL-MCNC: 1.8 MG/DL (ref 1.7–2.3)
MCH RBC QN AUTO: 29.3 PG (ref 26.5–33)
MCHC RBC AUTO-ENTMCNC: 31.9 G/DL (ref 31.5–36.5)
MCV RBC AUTO: 92 FL (ref 78–100)
MONOCYTES # BLD AUTO: 1.1 10E3/UL (ref 0–1.3)
MONOCYTES NFR BLD AUTO: 5 %
MUCOUS THREADS #/AREA URNS LPF: PRESENT /LPF
NEUTROPHILS # BLD AUTO: 16.6 10E3/UL (ref 1.6–8.3)
NEUTROPHILS NFR BLD AUTO: 82 %
NITRATE UR QL: POSITIVE
NRBC # BLD AUTO: 0 10E3/UL
NRBC BLD AUTO-RTO: 0 /100
P AXIS - MUSE: -13 DEGREES
PCO2 BLDV: 32 MM HG (ref 40–50)
PH BLDV: 7.44 [PH] (ref 7.32–7.43)
PH UR STRIP: 5 [PH] (ref 5–7)
PLATELET # BLD AUTO: 232 10E3/UL (ref 150–450)
PO2 BLDV: 28 MM HG (ref 25–47)
POTASSIUM SERPL-SCNC: 3.2 MMOL/L (ref 3.4–5.3)
POTASSIUM SERPL-SCNC: 4.3 MMOL/L (ref 3.4–5.3)
PR INTERVAL - MUSE: 188 MS
PROCALCITONIN SERPL IA-MCNC: 0.15 NG/ML
PROT SERPL-MCNC: 5.1 G/DL (ref 6.4–8.3)
PROT SERPL-MCNC: 6.1 G/DL (ref 6.4–8.3)
QRS DURATION - MUSE: 116 MS
QT - MUSE: 360 MS
QTC - MUSE: 487 MS
R AXIS - MUSE: -1 DEGREES
RBC # BLD AUTO: 3.11 10E6/UL (ref 3.8–5.2)
RBC URINE: 25 /HPF
SAO2 % BLDV: 57 % (ref 94–100)
SARS-COV-2 RNA RESP QL NAA+PROBE: NEGATIVE
SODIUM SERPL-SCNC: 140 MMOL/L (ref 136–145)
SODIUM SERPL-SCNC: 147 MMOL/L (ref 136–145)
SP GR UR STRIP: 1.03 (ref 1–1.03)
SPECIMEN EXPIRATION DATE: NORMAL
SQUAMOUS EPITHELIAL: <1 /HPF
SYSTOLIC BLOOD PRESSURE - MUSE: NORMAL MMHG
T AXIS - MUSE: -5 DEGREES
TROPONIN T SERPL HS-MCNC: 41 NG/L
TROPONIN T SERPL HS-MCNC: 57 NG/L
TROPONIN T SERPL HS-MCNC: 65 NG/L
UNIT ABO/RH: NORMAL
UNIT ABO/RH: NORMAL
UNIT NUMBER: NORMAL
UNIT NUMBER: NORMAL
UNIT STATUS: NORMAL
UNIT STATUS: NORMAL
UNIT TYPE ISBT: 6200
UNIT TYPE ISBT: 6200
UPPER GI ENDOSCOPY: NORMAL
UROBILINOGEN UR STRIP-MCNC: NORMAL MG/DL
VENTRICULAR RATE- MUSE: 110 BPM
WBC # BLD AUTO: 20.1 10E3/UL (ref 4–11)
WBC URINE: 11 /HPF

## 2023-08-07 PROCEDURE — 99223 1ST HOSP IP/OBS HIGH 75: CPT | Performed by: HOSPITALIST

## 2023-08-07 PROCEDURE — 86901 BLOOD TYPING SEROLOGIC RH(D): CPT | Performed by: HOSPITALIST

## 2023-08-07 PROCEDURE — 83735 ASSAY OF MAGNESIUM: CPT | Performed by: HOSPITALIST

## 2023-08-07 PROCEDURE — 200N000001 HC R&B ICU

## 2023-08-07 PROCEDURE — 250N000011 HC RX IP 250 OP 636: Mod: JZ | Performed by: HOSPITALIST

## 2023-08-07 PROCEDURE — 84484 ASSAY OF TROPONIN QUANT: CPT | Performed by: HOSPITALIST

## 2023-08-07 PROCEDURE — 250N000011 HC RX IP 250 OP 636: Performed by: EMERGENCY MEDICINE

## 2023-08-07 PROCEDURE — 84484 ASSAY OF TROPONIN QUANT: CPT | Performed by: EMERGENCY MEDICINE

## 2023-08-07 PROCEDURE — 999N000141 HC STATISTIC PRE-PROCEDURE NURSING ASSESSMENT: Performed by: INTERNAL MEDICINE

## 2023-08-07 PROCEDURE — 84145 PROCALCITONIN (PCT): CPT | Performed by: HOSPITALIST

## 2023-08-07 PROCEDURE — C9113 INJ PANTOPRAZOLE SODIUM, VIA: HCPCS | Mod: JZ | Performed by: EMERGENCY MEDICINE

## 2023-08-07 PROCEDURE — 70496 CT ANGIOGRAPHY HEAD: CPT

## 2023-08-07 PROCEDURE — 85041 AUTOMATED RBC COUNT: CPT | Performed by: EMERGENCY MEDICINE

## 2023-08-07 PROCEDURE — 999N000127 HC STATISTIC PERIPHERAL IV START W US GUIDANCE

## 2023-08-07 PROCEDURE — 87635 SARS-COV-2 COVID-19 AMP PRB: CPT | Performed by: HOSPITALIST

## 2023-08-07 PROCEDURE — 80048 BASIC METABOLIC PNL TOTAL CA: CPT | Performed by: INTERNAL MEDICINE

## 2023-08-07 PROCEDURE — 36415 COLL VENOUS BLD VENIPUNCTURE: CPT | Performed by: HOSPITALIST

## 2023-08-07 PROCEDURE — 83690 ASSAY OF LIPASE: CPT | Performed by: EMERGENCY MEDICINE

## 2023-08-07 PROCEDURE — 87086 URINE CULTURE/COLONY COUNT: CPT | Performed by: HOSPITALIST

## 2023-08-07 PROCEDURE — 360N000075 HC SURGERY LEVEL 2, PER MIN: Performed by: INTERNAL MEDICINE

## 2023-08-07 PROCEDURE — 258N000003 HC RX IP 258 OP 636: Performed by: NURSE ANESTHETIST, CERTIFIED REGISTERED

## 2023-08-07 PROCEDURE — 86923 COMPATIBILITY TEST ELECTRIC: CPT | Performed by: INTERNAL MEDICINE

## 2023-08-07 PROCEDURE — 82272 OCCULT BLD FECES 1-3 TESTS: CPT | Performed by: EMERGENCY MEDICINE

## 2023-08-07 PROCEDURE — 0DJ08ZZ INSPECTION OF UPPER INTESTINAL TRACT, VIA NATURAL OR ARTIFICIAL OPENING ENDOSCOPIC: ICD-10-PCS | Performed by: INTERNAL MEDICINE

## 2023-08-07 PROCEDURE — 83605 ASSAY OF LACTIC ACID: CPT | Performed by: EMERGENCY MEDICINE

## 2023-08-07 PROCEDURE — 36415 COLL VENOUS BLD VENIPUNCTURE: CPT | Performed by: EMERGENCY MEDICINE

## 2023-08-07 PROCEDURE — 82248 BILIRUBIN DIRECT: CPT | Performed by: EMERGENCY MEDICINE

## 2023-08-07 PROCEDURE — 70450 CT HEAD/BRAIN W/O DYE: CPT

## 2023-08-07 PROCEDURE — 82803 BLOOD GASES ANY COMBINATION: CPT

## 2023-08-07 PROCEDURE — 250N000011 HC RX IP 250 OP 636: Performed by: NURSE ANESTHETIST, CERTIFIED REGISTERED

## 2023-08-07 PROCEDURE — 86923 COMPATIBILITY TEST ELECTRIC: CPT | Performed by: HOSPITALIST

## 2023-08-07 PROCEDURE — C9113 INJ PANTOPRAZOLE SODIUM, VIA: HCPCS | Mod: JZ | Performed by: HOSPITALIST

## 2023-08-07 PROCEDURE — 710N000009 HC RECOVERY PHASE 1, LEVEL 1, PER MIN: Performed by: INTERNAL MEDICINE

## 2023-08-07 PROCEDURE — 74177 CT ABD & PELVIS W/CONTRAST: CPT

## 2023-08-07 PROCEDURE — 258N000003 HC RX IP 258 OP 636: Performed by: HOSPITALIST

## 2023-08-07 PROCEDURE — 258N000003 HC RX IP 258 OP 636: Performed by: EMERGENCY MEDICINE

## 2023-08-07 PROCEDURE — 250N000011 HC RX IP 250 OP 636: Mod: JZ | Performed by: INTERNAL MEDICINE

## 2023-08-07 PROCEDURE — 87040 BLOOD CULTURE FOR BACTERIA: CPT | Performed by: EMERGENCY MEDICINE

## 2023-08-07 PROCEDURE — P9016 RBC LEUKOCYTES REDUCED: HCPCS | Performed by: HOSPITALIST

## 2023-08-07 PROCEDURE — 250N000013 HC RX MED GY IP 250 OP 250 PS 637: Performed by: INTERNAL MEDICINE

## 2023-08-07 PROCEDURE — 85018 HEMOGLOBIN: CPT | Performed by: HOSPITALIST

## 2023-08-07 PROCEDURE — 250N000009 HC RX 250: Performed by: HOSPITALIST

## 2023-08-07 PROCEDURE — 86850 RBC ANTIBODY SCREEN: CPT | Performed by: HOSPITALIST

## 2023-08-07 PROCEDURE — 85610 PROTHROMBIN TIME: CPT | Performed by: EMERGENCY MEDICINE

## 2023-08-07 PROCEDURE — 250N000009 HC RX 250: Performed by: NURSE ANESTHETIST, CERTIFIED REGISTERED

## 2023-08-07 PROCEDURE — 81003 URINALYSIS AUTO W/O SCOPE: CPT | Performed by: HOSPITALIST

## 2023-08-07 PROCEDURE — 70498 CT ANGIOGRAPHY NECK: CPT

## 2023-08-07 PROCEDURE — 36415 COLL VENOUS BLD VENIPUNCTURE: CPT | Performed by: INTERNAL MEDICINE

## 2023-08-07 PROCEDURE — 83605 ASSAY OF LACTIC ACID: CPT

## 2023-08-07 PROCEDURE — 272N000001 HC OR GENERAL SUPPLY STERILE: Performed by: INTERNAL MEDICINE

## 2023-08-07 PROCEDURE — 370N000017 HC ANESTHESIA TECHNICAL FEE, PER MIN: Performed by: INTERNAL MEDICINE

## 2023-08-07 PROCEDURE — 85730 THROMBOPLASTIN TIME PARTIAL: CPT | Performed by: EMERGENCY MEDICINE

## 2023-08-07 RX ORDER — ONDANSETRON 4 MG/1
4 TABLET, ORALLY DISINTEGRATING ORAL EVERY 30 MIN PRN
Status: DISCONTINUED | OUTPATIENT
Start: 2023-08-07 | End: 2023-08-07 | Stop reason: HOSPADM

## 2023-08-07 RX ORDER — PIPERACILLIN SODIUM, TAZOBACTAM SODIUM 4; .5 G/20ML; G/20ML
4.5 INJECTION, POWDER, LYOPHILIZED, FOR SOLUTION INTRAVENOUS ONCE
Status: COMPLETED | OUTPATIENT
Start: 2023-08-07 | End: 2023-08-07

## 2023-08-07 RX ORDER — FLUMAZENIL 0.1 MG/ML
0.2 INJECTION, SOLUTION INTRAVENOUS
Status: DISCONTINUED | OUTPATIENT
Start: 2023-08-07 | End: 2023-08-08

## 2023-08-07 RX ORDER — LANOLIN ALCOHOL/MO/W.PET/CERES
3 CREAM (GRAM) TOPICAL
Status: DISCONTINUED | OUTPATIENT
Start: 2023-08-07 | End: 2023-08-11 | Stop reason: HOSPADM

## 2023-08-07 RX ORDER — ONDANSETRON 2 MG/ML
4 INJECTION INTRAMUSCULAR; INTRAVENOUS EVERY 6 HOURS PRN
Status: DISCONTINUED | OUTPATIENT
Start: 2023-08-07 | End: 2023-08-11 | Stop reason: HOSPADM

## 2023-08-07 RX ORDER — PROCHLORPERAZINE MALEATE 5 MG
5 TABLET ORAL EVERY 6 HOURS PRN
Status: DISCONTINUED | OUTPATIENT
Start: 2023-08-07 | End: 2023-08-11 | Stop reason: HOSPADM

## 2023-08-07 RX ORDER — HALOPERIDOL 5 MG/ML
2-4 INJECTION INTRAMUSCULAR EVERY 6 HOURS PRN
Status: DISCONTINUED | OUTPATIENT
Start: 2023-08-07 | End: 2023-08-08

## 2023-08-07 RX ORDER — FENTANYL CITRATE 50 UG/ML
25 INJECTION, SOLUTION INTRAMUSCULAR; INTRAVENOUS
Status: DISCONTINUED | OUTPATIENT
Start: 2023-08-07 | End: 2023-08-07 | Stop reason: HOSPADM

## 2023-08-07 RX ORDER — NALOXONE HYDROCHLORIDE 0.4 MG/ML
0.4 INJECTION, SOLUTION INTRAMUSCULAR; INTRAVENOUS; SUBCUTANEOUS
Status: DISCONTINUED | OUTPATIENT
Start: 2023-08-07 | End: 2023-08-11 | Stop reason: HOSPADM

## 2023-08-07 RX ORDER — LIDOCAINE HYDROCHLORIDE 20 MG/ML
INJECTION, SOLUTION INFILTRATION; PERINEURAL PRN
Status: DISCONTINUED | OUTPATIENT
Start: 2023-08-07 | End: 2023-08-07

## 2023-08-07 RX ORDER — ACETAMINOPHEN 650 MG/1
650 SUPPOSITORY RECTAL EVERY 6 HOURS PRN
Status: DISCONTINUED | OUTPATIENT
Start: 2023-08-07 | End: 2023-08-11 | Stop reason: HOSPADM

## 2023-08-07 RX ORDER — AMOXICILLIN 250 MG
1 CAPSULE ORAL 2 TIMES DAILY PRN
Status: DISCONTINUED | OUTPATIENT
Start: 2023-08-07 | End: 2023-08-11 | Stop reason: HOSPADM

## 2023-08-07 RX ORDER — METOPROLOL TARTRATE 1 MG/ML
2.5 INJECTION, SOLUTION INTRAVENOUS ONCE
Status: COMPLETED | OUTPATIENT
Start: 2023-08-07 | End: 2023-08-07

## 2023-08-07 RX ORDER — OXYCODONE HYDROCHLORIDE 5 MG/1
5 TABLET ORAL
Status: DISCONTINUED | OUTPATIENT
Start: 2023-08-07 | End: 2023-08-07 | Stop reason: HOSPADM

## 2023-08-07 RX ORDER — BISACODYL 10 MG
10 SUPPOSITORY, RECTAL RECTAL DAILY PRN
Status: DISCONTINUED | OUTPATIENT
Start: 2023-08-07 | End: 2023-08-11 | Stop reason: HOSPADM

## 2023-08-07 RX ORDER — ONDANSETRON 4 MG/1
4 TABLET, ORALLY DISINTEGRATING ORAL EVERY 6 HOURS PRN
Status: DISCONTINUED | OUTPATIENT
Start: 2023-08-07 | End: 2023-08-11 | Stop reason: HOSPADM

## 2023-08-07 RX ORDER — NALOXONE HYDROCHLORIDE 0.4 MG/ML
0.2 INJECTION, SOLUTION INTRAMUSCULAR; INTRAVENOUS; SUBCUTANEOUS
Status: DISCONTINUED | OUTPATIENT
Start: 2023-08-07 | End: 2023-08-11 | Stop reason: HOSPADM

## 2023-08-07 RX ORDER — SODIUM CHLORIDE, SODIUM LACTATE, POTASSIUM CHLORIDE, CALCIUM CHLORIDE 600; 310; 30; 20 MG/100ML; MG/100ML; MG/100ML; MG/100ML
INJECTION, SOLUTION INTRAVENOUS CONTINUOUS
Status: DISCONTINUED | OUTPATIENT
Start: 2023-08-07 | End: 2023-08-08

## 2023-08-07 RX ORDER — LORAZEPAM 2 MG/ML
0.5 INJECTION INTRAMUSCULAR ONCE
Status: COMPLETED | OUTPATIENT
Start: 2023-08-07 | End: 2023-08-07

## 2023-08-07 RX ORDER — POLYETHYLENE GLYCOL 3350 17 G/17G
17 POWDER, FOR SOLUTION ORAL DAILY PRN
Status: DISCONTINUED | OUTPATIENT
Start: 2023-08-07 | End: 2023-08-11 | Stop reason: HOSPADM

## 2023-08-07 RX ORDER — PROPOFOL 10 MG/ML
INJECTION, EMULSION INTRAVENOUS PRN
Status: DISCONTINUED | OUTPATIENT
Start: 2023-08-07 | End: 2023-08-07

## 2023-08-07 RX ORDER — PIPERACILLIN SODIUM, TAZOBACTAM SODIUM 3; .375 G/15ML; G/15ML
3.38 INJECTION, POWDER, LYOPHILIZED, FOR SOLUTION INTRAVENOUS EVERY 6 HOURS
Status: DISCONTINUED | OUTPATIENT
Start: 2023-08-07 | End: 2023-08-08

## 2023-08-07 RX ORDER — POTASSIUM CHLORIDE 7.45 MG/ML
10 INJECTION INTRAVENOUS
Status: COMPLETED | OUTPATIENT
Start: 2023-08-07 | End: 2023-08-07

## 2023-08-07 RX ORDER — PROPOFOL 10 MG/ML
INJECTION, EMULSION INTRAVENOUS CONTINUOUS PRN
Status: DISCONTINUED | OUTPATIENT
Start: 2023-08-07 | End: 2023-08-07

## 2023-08-07 RX ORDER — ONDANSETRON 2 MG/ML
4 INJECTION INTRAMUSCULAR; INTRAVENOUS EVERY 30 MIN PRN
Status: DISCONTINUED | OUTPATIENT
Start: 2023-08-07 | End: 2023-08-07 | Stop reason: HOSPADM

## 2023-08-07 RX ORDER — LIDOCAINE 40 MG/G
CREAM TOPICAL
Status: DISCONTINUED | OUTPATIENT
Start: 2023-08-07 | End: 2023-08-11 | Stop reason: HOSPADM

## 2023-08-07 RX ORDER — ACETAMINOPHEN 325 MG/1
650 TABLET ORAL EVERY 6 HOURS PRN
Status: DISCONTINUED | OUTPATIENT
Start: 2023-08-07 | End: 2023-08-11 | Stop reason: HOSPADM

## 2023-08-07 RX ORDER — GABAPENTIN 100 MG/1
200 CAPSULE ORAL AT BEDTIME
COMMUNITY

## 2023-08-07 RX ORDER — PROCHLORPERAZINE 25 MG
12.5 SUPPOSITORY, RECTAL RECTAL EVERY 12 HOURS PRN
Status: DISCONTINUED | OUTPATIENT
Start: 2023-08-07 | End: 2023-08-11 | Stop reason: HOSPADM

## 2023-08-07 RX ORDER — AMOXICILLIN 250 MG
2 CAPSULE ORAL 2 TIMES DAILY PRN
Status: DISCONTINUED | OUTPATIENT
Start: 2023-08-07 | End: 2023-08-11 | Stop reason: HOSPADM

## 2023-08-07 RX ORDER — ATORVASTATIN CALCIUM 10 MG/1
10 TABLET, FILM COATED ORAL DAILY
Status: DISCONTINUED | OUTPATIENT
Start: 2023-08-07 | End: 2023-08-11 | Stop reason: HOSPADM

## 2023-08-07 RX ORDER — OLANZAPINE 10 MG/2ML
5 INJECTION, POWDER, FOR SOLUTION INTRAMUSCULAR 2 TIMES DAILY PRN
Status: DISCONTINUED | OUTPATIENT
Start: 2023-08-07 | End: 2023-08-08

## 2023-08-07 RX ORDER — OXYCODONE HYDROCHLORIDE 5 MG/1
10 TABLET ORAL
Status: DISCONTINUED | OUTPATIENT
Start: 2023-08-07 | End: 2023-08-07 | Stop reason: HOSPADM

## 2023-08-07 RX ORDER — SODIUM CHLORIDE, SODIUM LACTATE, POTASSIUM CHLORIDE, CALCIUM CHLORIDE 600; 310; 30; 20 MG/100ML; MG/100ML; MG/100ML; MG/100ML
INJECTION, SOLUTION INTRAVENOUS CONTINUOUS PRN
Status: DISCONTINUED | OUTPATIENT
Start: 2023-08-07 | End: 2023-08-07

## 2023-08-07 RX ORDER — PANTOPRAZOLE SODIUM 40 MG/1
40 TABLET, DELAYED RELEASE ORAL 2 TIMES DAILY
Status: DISCONTINUED | OUTPATIENT
Start: 2023-08-07 | End: 2023-08-11 | Stop reason: HOSPADM

## 2023-08-07 RX ADMIN — LORAZEPAM 0.5 MG: 2 INJECTION INTRAMUSCULAR; INTRAVENOUS at 00:22

## 2023-08-07 RX ADMIN — SODIUM CHLORIDE, POTASSIUM CHLORIDE, SODIUM LACTATE AND CALCIUM CHLORIDE: 600; 310; 30; 20 INJECTION, SOLUTION INTRAVENOUS at 13:36

## 2023-08-07 RX ADMIN — SODIUM CHLORIDE, POTASSIUM CHLORIDE, SODIUM LACTATE AND CALCIUM CHLORIDE: 600; 310; 30; 20 INJECTION, SOLUTION INTRAVENOUS at 21:52

## 2023-08-07 RX ADMIN — PANTOPRAZOLE SODIUM 80 MG: 40 INJECTION, POWDER, FOR SOLUTION INTRAVENOUS at 02:19

## 2023-08-07 RX ADMIN — PROPOFOL 75 MCG/KG/MIN: 10 INJECTION, EMULSION INTRAVENOUS at 13:31

## 2023-08-07 RX ADMIN — IOPAMIDOL 75 ML: 755 INJECTION, SOLUTION INTRAVENOUS at 01:15

## 2023-08-07 RX ADMIN — LORAZEPAM 0.5 MG: 2 INJECTION INTRAMUSCULAR; INTRAVENOUS at 00:55

## 2023-08-07 RX ADMIN — PIPERACILLIN AND TAZOBACTAM 4.5 G: 4; .5 INJECTION, POWDER, FOR SOLUTION INTRAVENOUS at 02:19

## 2023-08-07 RX ADMIN — POTASSIUM CHLORIDE 10 MEQ: 7.46 INJECTION, SOLUTION INTRAVENOUS at 19:42

## 2023-08-07 RX ADMIN — PANTOPRAZOLE SODIUM 40 MG: 40 TABLET, DELAYED RELEASE ORAL at 20:45

## 2023-08-07 RX ADMIN — SODIUM CHLORIDE, POTASSIUM CHLORIDE, SODIUM LACTATE AND CALCIUM CHLORIDE: 600; 310; 30; 20 INJECTION, SOLUTION INTRAVENOUS at 03:31

## 2023-08-07 RX ADMIN — HALOPERIDOL LACTATE 4 MG: 5 INJECTION, SOLUTION INTRAMUSCULAR at 03:31

## 2023-08-07 RX ADMIN — POTASSIUM CHLORIDE 10 MEQ: 7.46 INJECTION, SOLUTION INTRAVENOUS at 22:51

## 2023-08-07 RX ADMIN — POTASSIUM CHLORIDE 10 MEQ: 7.46 INJECTION, SOLUTION INTRAVENOUS at 20:46

## 2023-08-07 RX ADMIN — METOPROLOL TARTRATE 2.5 MG: 5 INJECTION INTRAVENOUS at 05:48

## 2023-08-07 RX ADMIN — PIPERACILLIN AND TAZOBACTAM 3.38 G: 3; .375 INJECTION, POWDER, FOR SOLUTION INTRAVENOUS at 20:46

## 2023-08-07 RX ADMIN — PROPOFOL 30 MG: 10 INJECTION, EMULSION INTRAVENOUS at 13:34

## 2023-08-07 RX ADMIN — SODIUM CHLORIDE 8 MG/HR: 9 INJECTION, SOLUTION INTRAVENOUS at 03:18

## 2023-08-07 RX ADMIN — TOPICAL ANESTHETIC 1 EACH: 200 SPRAY DENTAL; PERIODONTAL at 13:28

## 2023-08-07 RX ADMIN — PIPERACILLIN AND TAZOBACTAM 3.38 G: 3; .375 INJECTION, POWDER, FOR SOLUTION INTRAVENOUS at 18:17

## 2023-08-07 RX ADMIN — SODIUM CHLORIDE, POTASSIUM CHLORIDE, SODIUM LACTATE AND CALCIUM CHLORIDE: 600; 310; 30; 20 INJECTION, SOLUTION INTRAVENOUS at 12:42

## 2023-08-07 RX ADMIN — POTASSIUM CHLORIDE 10 MEQ: 7.46 INJECTION, SOLUTION INTRAVENOUS at 21:44

## 2023-08-07 RX ADMIN — LIDOCAINE HYDROCHLORIDE 30 MG: 20 INJECTION, SOLUTION INFILTRATION; PERINEURAL at 13:31

## 2023-08-07 RX ADMIN — PIPERACILLIN AND TAZOBACTAM 3.38 G: 3; .375 INJECTION, POWDER, FOR SOLUTION INTRAVENOUS at 09:32

## 2023-08-07 RX ADMIN — SODIUM CHLORIDE 1000 ML: 9 INJECTION, SOLUTION INTRAVENOUS at 02:09

## 2023-08-07 RX ADMIN — PROPOFOL 20 MG: 10 INJECTION, EMULSION INTRAVENOUS at 13:38

## 2023-08-07 RX ADMIN — SODIUM CHLORIDE 1000 ML: 9 INJECTION, SOLUTION INTRAVENOUS at 00:28

## 2023-08-07 ASSESSMENT — ACTIVITIES OF DAILY LIVING (ADL)
ADLS_ACUITY_SCORE: 41
WALKING_OR_CLIMBING_STAIRS_DIFFICULTY: NO
ADLS_ACUITY_SCORE: 35
ADLS_ACUITY_SCORE: 35
CHANGE_IN_FUNCTIONAL_STATUS_SINCE_ONSET_OF_CURRENT_ILLNESS/INJURY: NO
ADLS_ACUITY_SCORE: 41
EQUIPMENT_CURRENTLY_USED_AT_HOME: WALKER, ROLLING
ADLS_ACUITY_SCORE: 40
DRESSING/BATHING_DIFFICULTY: NO
ADLS_ACUITY_SCORE: 28
ADLS_ACUITY_SCORE: 35
FALL_HISTORY_WITHIN_LAST_SIX_MONTHS: NO
ADLS_ACUITY_SCORE: 35
ADLS_ACUITY_SCORE: 35
ADLS_ACUITY_SCORE: 28
DOING_ERRANDS_INDEPENDENTLY_DIFFICULTY: YES
VISION_MANAGEMENT: READING GLASSES
TOILETING_ISSUES: NO
ADLS_ACUITY_SCORE: 28
CONCENTRATING,_REMEMBERING_OR_MAKING_DECISIONS_DIFFICULTY: NO
WEAR_GLASSES_OR_BLIND: YES
DIFFICULTY_EATING/SWALLOWING: NO
ADLS_ACUITY_SCORE: 28

## 2023-08-07 NOTE — PHARMACY-ADMISSION MEDICATION HISTORY
Pharmacist Admission Medication History    Admission medication history is complete. The information provided in this note is only as accurate as the sources available at the time of the update.    Medication reconciliation/reorder completed by provider prior to medication history? No    Information Source(s): Family member and CareEverywhere/SureScripts via phone  Spoke with Gila paris, via phone    Pertinent Information: Daughter wasn't 100% familiar with patient's medications but knew she was taking gabapentin, something for cholesterol, and stated she no longer takes any BP meds. Could not verify any OTCs at this time, will have to wait until patient status improves to ask pt.     Myrbetriq: Prescribed to start on 8/2 but family has yet to  from pharmacy, so pt has not started. Did not add to list.     Vitron-C: was started in June 2023 for 90d supply, daughter unfamiliar. So did not add to list at this time.     Changes made to PTA medication list:  Added: None  Deleted: lisinopril, pregabalin, tramadol, trimethoprim   Changed: gabapentin dose     Medication Affordability:  Not including over the counter (OTC) medications, was there a time in the past 3 months when you did not take your medications as prescribed because of cost?: Unable to Assess    Allergies reviewed with patient and updates made in EHR: no    Medication History Completed By:   Kelly Mcpherson, PharmD, [facility]   Emergency Medicine Pharmacist  [phone] or Rashmi  August 7, 2023      Prior to Admission medications    Medication Sig Last Dose Taking? Auth Provider Long Term End Date   bismuth subsalicylate (PEPTO BISMOL) 262 MG chewable tablet Take 1-2 tablets every 4 hours as needed for heartburn Unknown at - Yes Alex Whitten MD     calcium carbonate (TUMS) 500 MG chewable tablet Take 1 chew tab by mouth 2 times daily as needed for heartburn Unknown at - Yes Unknown, Entered By History     CRANBERRY PO Take 1 tablet by mouth daily  Unknown at - Yes Reported, Patient     gabapentin (NEURONTIN) 100 MG capsule Take 200 mg by mouth At Bedtime 8/5/2023 at  Yes Unknown, Entered By History Yes    multivitamin w/minerals (THERA-VIT-M) tablet Take 1 tablet by mouth daily Unknown at - Yes Reported, Patient     Omega-3 Fatty Acids (FISH OIL PO) Take 1,000 mg by mouth daily  Unknown at - Yes Reported, Patient     atorvastatin (LIPITOR) 10 MG tablet TAKE 1 TABLET BY MOUTH EVERY DAY [facility]

## 2023-08-07 NOTE — CONSULTS
GASTROENTEROLOGY CONSULTATION     Floridalma Cunningham  [address]  [place] [address]  88 year old female    Admission Date/Time: 8/6/2023  Primary Care Provider: No Ref-Primary, Physician    We were asked to see the patient in consultation by Dr. Fontenot for evaluation of hematemesis.        HPI:  Floridalma Cunningham is a 88 year old female with a past medical history significant for arthritis, hyperlipidemia, hypertension, PVCs, chronic anemia, and right bundle branch block who was admitted with acute encephalopathy, lower abdominal pain, acute on chronic anemia, and SIRS.    [place] cabin with her [place] ,she developed lower abdominal pain for which she was taking Pepto-Bismol, nausea, and vomiting.  Her vomiting was reported to be dark and attributed to possible Pepto-Bismol.  She fell in the bathroom while she was at the cabin and then became confused.      The patient continues to be confused, currently denying abdominal pain, nurse and daughters report she had vomited dark and recently had smears of black in her depends, no further vomiting, no fresh red blood per rectum like she hadd last October/November 2021.  Denies use of NSAIDs and ASA.    Her labs on admission showed an elevated BUN of 79.1 with a normal creatinine.  Her LFTs are normal.  Her lipase is normal.  Her hgb August 2021 was 11.2 then she presented with hematochezia in October/November 2021 and her hemoglobin dropped to a low of 6.9, improved to her baseline of 11.0, and now during this admission she was 9.1 and after IV hydration 7.0.    She had a CT scan 8-7-23  Large hiatal hernia.    HEPATOBILIARY: Calcified gallstones in the gallbladder. Fatty infiltration the liver. Stable low-attenuation foci in the liver most consistent with cysts. No follow-up needed.     PANCREAS: No significant mass, duct dilatation, or inflammatory change.     SPLEEN: Normal.      BOWEL: Respiratory motion obscures details. Nonspecific nonobstructive  bowel gas pattern. Multiple colonic diverticula, greatest in the sigmoid colon. No definite CT evidence for diverticulitis but respiratory motion obscures details. Appendix not   visualized     VASCULATURE: No abdominal aortic aneurysm. Moderate to advanced aortoiliac vascular calcifications     PELVIC ORGANS: No pelvic masses.         November 2, 2021 she had interventional radiology visceral angiogram for hematochezia.  There was no active extravasation identified.  Findings had been discussed with the gastroenterologist prior to angiography.  Active extravasation had been seen on endoscopy in the area of the proximal clips.  It was elected to empirically embolize the main branch providing flow to this region.    She had a colonoscopy October 30, 2021 for hematochezia.  This showed clotted blood in the rectum, rectosigmoid colon, sigmoid colon, and splenic flexure.  Copious quantities of semisolid stool was found in the transverse colon, hepatic flexure, ascending colon, and cecum.  A single medium mouth diverticulum was found in the descending colon, erythema was seen in association with the diverticular opening there was active bleeding from this diverticula, this was successfully injected with 1 mL of 1-10,000 solution of epinephrine for hemostasis, bipolar probe for coagulation, hemoclip placed, and area tattooed.  There was no bleeding at the end of the procedure.  A second area of previous treatment was also noted with intact endoclips.  Scattered small and large mouth diverticula were found in the entire colon.    She had a colonoscopy October 28, 2021 for hematochezia.  This showed severe diverticulosis in the descending colon.  She had diverticulitis.  There is active bleeding coming from the diverticular opening.  There was evidence of recent bleeding from the diverticular opening.  This was injected, treated with bipolar cautery, and clips were placed.              ROS: A comprehensive ten point review  of systems was negative aside from those in mentioned in the HPI.      MEDICATIONS: No current facility-administered medications on file prior to encounter.  acetaminophen (TYLENOL) 325 MG tablet, Take 650 mg by mouth every 8 hours as needed  (Patient not taking: Reported on 7/18/2022)  aspirin (ASA) 81 MG EC tablet, Take 81 mg by mouth (Patient not taking: Reported on 7/18/2022)  atorvastatin (LIPITOR) 10 MG tablet, TAKE 1 TABLET BY MOUTH EVERY DAY  bismuth subsalicylate (PEPTO BISMOL) 262 MG chewable tablet, Take 1-2 tablets every 4 hours as needed for heartburn  calcium carbonate (TUMS) 500 MG chewable tablet, Take 1 chew tab by mouth 2 times daily as needed for heartburn  CRANBERRY PO, Take 1 tablet by mouth daily  gabapentin (NEURONTIN) 100 MG capsule, Take 1 capsule (100 mg) by mouth 3 times daily  glucosamine-chondroitin 500-400 MG CAPS per capsule, Take 1 capsule by mouth (Patient not taking: Reported on 7/18/2022)  lisinopril (ZESTRIL) 10 MG tablet, TAKE 1 TABLET (10 MG) BY MOUTH DAILY.  multivitamin w/minerals (THERA-VIT-M) tablet, Take 1 tablet by mouth daily  Omega-3 Fatty Acids (FISH OIL PO), Take 1,000 mg by mouth daily   pregabalin (LYRICA) 50 MG capsule, Take 1 capsule (50 mg) by mouth 2 times daily  traMADol (ULTRAM) 50 MG tablet, Take 1 tablet (50 mg) by mouth 2 times daily as needed for moderate to severe pain  trimethoprim (TRIMPEX) 100 MG tablet, Take 1 tablet (100 mg) by mouth daily        ALLERGIES:   Allergies   Allergen Reactions    Sulfa Antibiotics        Past Medical History:   Diagnosis Date    Arthritis     Hyperlipidemia     Hypertension     PVC's (premature ventricular contractions)     RBBB (right bundle branch block)        Past Surgical History:   Procedure Laterality Date    ARTHROPLASTY KNEE Right 8/11/2021    Procedure: RIGHT TOTAL KNEE ARTHROPLASTY;  Surgeon: Eamon Awan MD;  Location: [facility] OR    IR VISCERAL ANGIOGRAM  11/2/2021         SOCIAL HISTORY:  Social History      Tobacco Use    Smoking status: Former     Types: Cigarettes     Quit date: 1945     Years since quittin.2    Smokeless tobacco: Never    Tobacco comments:     smoked for two years   Vaping Use    Vaping Use: Never used   Substance Use Topics    Alcohol use: Yes     Comment: rarely    Drug use: No       FAMILY HISTORY:  Reviewed in her chart    PHYSICAL EXAM:   BP (!) 139/90   Pulse 93   Temp 97.9  F (36.6  C) (Temporal)   Resp 26   SpO2 96%     Constitutional: NAD, comfortable  Cardiovascular: RRR  Respiratory: CTAB  Psychiatric: confused  Head: Normocephalic. Atraumatic.    Neck: Neck supple. No adenopathy. Thyroid symmetric, normal size, trachea midline  Eyes:  PERRL  ENT: hearing adequate  Abdomen:   Soft, nt.nd, nabs  NEURO: grossly negative        ADDITIONAL COMMENTS:   I reviewed the patient's new clinical lab test results.   Recent Labs   Lab Test 23  0555 23  0004 10/31/22  1106 22  1516 21  1111 21  0733 10/26/21  2309 10/26/21  1733 10/26/21  1059   WBC  --  20.1*  --  10.9  --  8.4   < >  --   --    HGB 7.0* 9.1* 11.0* 11.2*   < > 7.9*   < > 10.9*  --    MCV  --  92  --  78  --  89   < >  --   --    PLT  --  232  --  387  --  275   < >  --   --    INR  --  1.29*  --   --   --   --   --  1.09 1.14    < > = values in this interval not displayed.     Recent Labs   Lab Test 23  0004 23  2344 22  1516 10/31/21  0637     --  138 138   POTASSIUM 4.3  --  4.4 4.1   CHLORIDE 103  --  106 108   CO2 20*  --  26 24   BUN 79.1*  --  14 7   CR 0.77  --  0.78 0.77   ANIONGAP 17*  --  6 6   VLADIMIR 9.8  --  9.2 7.8*   * 110* 136* 82     Recent Labs   Lab Test 23  0004 22  1745 22  1516 10/26/21  1057 21  1527 20  1344 20  1026   ALBUMIN 3.4*  --  2.9* 2.7*   < >  --   --    BILITOTAL 0.5  --  0.4 0.4   < >  --   --    ALT 24  --  30 32   < >  --   --    AST 26  --  27 24   < >  --   --    ALKPHOS 83  --  154* 119    < >  --   --    PROTEIN  --  Trace*  --   --   --  Negative 30*   LIPASE 24  --   --   --   --   --   --     < > = values in this interval not displayed.             .    CONSULTATION ASSESSMENT AND PLAN:    Floridalma Cunningham is a 88 year old female with a past medical history significant for arthritis, hyperlipidemia, hypertension, PVCs, chronic anemia, diverticular bleed in 2021, and right bundle branch block who was admitted with acute encephalopathy, lower abdominal pain, acute on chronic anemia, and SIRS.      She was noted to have hematemesis, small amounts of melena, and drop in her hgb from 9 range to 7 range during this admission.  The differential diagnosis includes PUD, AVMs, esophagitis, malignancy, vs other.    Will proceed with urgent EGD in the OR today.  Further recommendations to follow.    Luann Ruiz MD  [facility]

## 2023-08-07 NOTE — PLAN OF CARE
Pertinent assessments:   Neuro: Alert. Confused. Disorientated to time, situation and place.   Cardiac: SR with frequent PVC's and PAC's  Resp: LS clear. Maintaining sats on room air.   GI: BS + smear of soft formed BM.   : voiding with difficulty. Incontinent and straight cathed x 2 for large amount of retained urine.   Lines: PIV removed by patient x 2. Awaiting midline vs ultrasound IV placement.   Drips: LR @ 100    Major Shift Events:   EGD - see results.   Mentation improving through out day.   Regular diet ordered.      Plan (Upcoming Events): TBD.  GI signed off. Protonix BID.  Continue zosyn   Discharge/Transfer Needs: TBD     Bedside Shift Report Completed : yes  Bedside Safety Check Completed: yes

## 2023-08-07 NOTE — PROCEDURES
PRE-PROCEDURE H&P    CHIEF COMPLAINT / REASON FOR PROCEDURE:  melena and anemia    PERTINENT HISTORY :    Past Medical History:   Diagnosis Date    Arthritis     Hyperlipidemia     Hypertension     PVC's (premature ventricular contractions)     RBBB (right bundle branch block)       Past Surgical History:   Procedure Laterality Date    ARTHROPLASTY KNEE Right 8/11/2021    Procedure: RIGHT TOTAL KNEE ARTHROPLASTY;  Surgeon: Eamon Awan MD;  Location: [facility] OR    IR VISCERAL ANGIOGRAM  11/2/2021         Bleeding tendencies:  No    Relevant Family History:  NONE     Relevant Social History:  NONE      A relevant review of systems was performed and was negative      ALLERGIES/SENSITIVITIES:   Allergies   Allergen Reactions    Sulfa Antibiotics        CURRENT MEDICATIONS:   No current outpatient medications on file.        PRE-SEDATION ASSESSMENT:    Lung Exam:  normal  Heart Exam:  normal  Airway Exam: normal  Previous reaction to anesthesia/sedation:   No  Sedation plan based on assessment: MAC  ASA Classification:  3 - Severe systemic disease, but not incapacitating        IMPRESSION:  melena and anemia    PLAN:  EGD     María Ruiz MD  [facility]  Office: [phone]

## 2023-08-07 NOTE — ED NOTES
DATE/TIME OF CALL RECEIVED FROM LAB:  08/07/2308/07/23 at 6:58 AM   LAB TEST:  Troponin  LAB VALUE:  57   PROVIDER NOTIFIED?: Yes  PROVIDER NAME: Dr. Aristeo Blanco  DATE/TIME LAB VALUE REPORTED TO PROVIDER: 08/07/2023 @0632  MECHANISM OF PROVIDER NOTIFICATION: Page  PROVIDER RESPONSE: Awaiting Order

## 2023-08-07 NOTE — ED NOTES
Patient having runs of PVCs and going in and out of NSR with bigeminy PVCs. Dr. Francis ellison. Patient sleeping in bed. Remains altered though not complaining of chest pain. VS remains stable. Will continue to monitor.

## 2023-08-07 NOTE — ANESTHESIA PREPROCEDURE EVALUATION
Anesthesia Pre-Procedure Evaluation    Patient: Floridalma Cunningham   MRN: [id number] : 1935        Procedure : Procedure(s):  ESOPHAGOGASTRODUODENOSCOPY          Past Medical History:   Diagnosis Date    Arthritis     Hyperlipidemia     Hypertension     PVC's (premature ventricular contractions)     RBBB (right bundle branch block)       Past Surgical History:   Procedure Laterality Date    ARTHROPLASTY KNEE Right 2021    Procedure: RIGHT TOTAL KNEE ARTHROPLASTY;  Surgeon: Eamon Aawn MD;  Location: [facility]    IR VISCERAL ANGIOGRAM  2021      Allergies   Allergen Reactions    Sulfa Antibiotics       Social History     Tobacco Use    Smoking status: Former     Types: Cigarettes     Quit date: 1945     Years since quittin.2    Smokeless tobacco: Never    Tobacco comments:     smoked for two years   Substance Use Topics    Alcohol use: Yes     Comment: rarely      Wt Readings from Last 1 Encounters:   23 71.2 kg (156 lb 15.5 oz)        Anesthesia Evaluation   Pt has had prior anesthetic. Type: General.        ROS/MED HX  ENT/Pulmonary:  - neg pulmonary ROS     Neurologic:  - neg neurologic ROS     Cardiovascular: Comment: RBBB (right bundle branch block)    (+) Dyslipidemia hypertension- -   -  - -                                      METS/Exercise Tolerance:     Hematologic: Comments: Lab Test        08/07/23     08/07/23     10/31/22     02/01/22     11/01/21     11/01/21     10/26/21     10/26/21     10/26/21                       0555          0004          1106          1516          1111          0733          2309          1733          1059          WBC           --          20.1*         --          10.9          --          8.4            < >         --           --           HGB          7.0*         9.1*         11.0*        11.2*          < >        7.9*           < >        10.9*         --           MCV           --          92            --          78            --           89             < >         --           --           PLT           --          232           --          387           --          275            < >         --           --           INR           --          1.29*         --           --           --           --           --          1.09         1.14           < > = values in this interval not displayed.                  Lab Test        08/07/23     08/07/23     08/06/23     02/01/22     10/31/21                       0849          0004          2344          1516          0637          NA            --          140           --          138          138           POTASSIUM     --          4.3           --          4.4          4.1           CHLORIDE      --          103           --          106          108           CO2           --          20*           --          26           24            BUN           --          79.1*         --          14           7             CR            --          0.77          --          0.78         0.77          ANIONGAP      --          17*           --          6            6             VLADIMIR           --          9.8           --          9.2          7.8*          GLC          92           108*         110*         136*         82                  Musculoskeletal:  - neg musculoskeletal ROS     GI/Hepatic: Comment: Acute upper GI bleed      Renal/Genitourinary:     (+) renal disease,             Endo:  - neg endo ROS     Psychiatric/Substance Use:  - neg psychiatric ROS     Infectious Disease:  - neg infectious disease ROS     Malignancy:  - neg malignancy ROS     Other:  - neg other ROS          Physical Exam    Airway        Mallampati: II   TM distance: > 3 FB   Neck ROM: full   Mouth opening: > 3 cm    Respiratory Devices and Support         Dental       (+) Minor Abnormalities - some fillings, tiny chips      Cardiovascular   cardiovascular exam normal          Pulmonary   pulmonary exam normal                 OUTSIDE LABS:  CBC:   Lab Results   Component Value Date    WBC 20.1 (H) 08/07/2023    WBC 10.9 02/01/2022    HGB 7.0 (L) 08/07/2023    HGB 9.1 (L) 08/07/2023    HCT 28.5 (L) 08/07/2023    HCT 36.1 02/01/2022     08/07/2023     02/01/2022     BMP:   Lab Results   Component Value Date     08/07/2023     02/01/2022    POTASSIUM 4.3 08/07/2023    POTASSIUM 4.4 02/01/2022    CHLORIDE 103 08/07/2023    CHLORIDE 106 02/01/2022    CO2 20 (L) 08/07/2023    CO2 26 02/01/2022    BUN 79.1 (H) 08/07/2023    BUN 14 02/01/2022    CR 0.77 08/07/2023    CR 0.78 02/01/2022    GLC 92 08/07/2023     (H) 08/07/2023     COAGS:   Lab Results   Component Value Date    PTT 26 08/07/2023    INR 1.29 (H) 08/07/2023     POC: No results found for: BGM, HCG, HCGS  HEPATIC:   Lab Results   Component Value Date    ALBUMIN 3.4 (L) 08/07/2023    PROTTOTAL 6.1 (L) 08/07/2023    ALT 24 08/07/2023    AST 26 08/07/2023    ALKPHOS 83 08/07/2023    BILITOTAL 0.5 08/07/2023     OTHER:   Lab Results   Component Value Date    LACT 2.5 (H) 08/07/2023    A1C 5.3 05/20/2021    VLADIMIR 9.8 08/07/2023    MAG 1.8 08/07/2023    LIPASE 24 08/07/2023    TSH 0.82 02/01/2022    T4 1.14 05/20/2021       Anesthesia Plan    ASA Status:  3       Anesthesia Type: MAC.     - Reason for MAC: immobility needed   Induction: Propofol.   Maintenance: TIVA.        Consents    Anesthesia Plan(s) and associated risks, benefits, and realistic alternatives discussed. Questions answered and patient/representative(s) expressed understanding.     - Discussed: Risks, Benefits and Alternatives for BOTH SEDATION and the PROCEDURE were discussed     - Discussed with:  Patient      - Extended Intubation/Ventilatory Support Discussed: No.      Use of blood products discussed: No .     Postoperative Care    Pain management: IV analgesics.   PONV prophylaxis: Ondansetron (or other 5HT-3), Dexamethasone or Solumedrol     Comments:                Bradford CLEANING  MD Eitan

## 2023-08-07 NOTE — ED NOTES
DATE/TIME OF CALL RECEIVED FROM LAB:  08/07/23 at 6:22 AM   LAB TEST:  Hemoglobin  LAB VALUE:  7  PROVIDER NOTIFIED?: Yes - MD paged  PROVIDER NAME: Dr. Aristeo Blanco  DATE/TIME LAB VALUE REPORTED TO PROVIDER: 8/7/23 @0622  MECHANISM OF PROVIDER NOTIFICATION: Page  PROVIDER RESPONSE: Awaiting for Orders

## 2023-08-07 NOTE — ANESTHESIA POSTPROCEDURE EVALUATION
Patient: Floridalma Cunningham    Procedure: Procedure(s):  ESOPHAGOGASTRODUODENOSCOPY       Anesthesia Type:  MAC    Note:  Disposition: Outpatient   Postop Pain Control: Uneventful            Sign Out: Well controlled pain   PONV: No   Neuro/Psych: Uneventful            Sign Out: Acceptable/Baseline neuro status   Airway/Respiratory: Uneventful            Sign Out: Acceptable/Baseline resp. status   CV/Hemodynamics: Uneventful            Sign Out: Acceptable CV status; No obvious hypovolemia; No obvious fluid overload   Other NRE: NONE   DID A NON-ROUTINE EVENT OCCUR? No           Last vitals:  Vitals Value Taken Time   /85 08/07/23 1500   Temp 98.2  F (36.8  C) 08/07/23 1500   Pulse 115 08/07/23 1500   Resp 23 08/07/23 1455   SpO2 97 % 08/07/23 1500   Vitals shown include unvalidated device data.    Electronically Signed By: Bradford Laird MD  August 7, 2023  3:49 PM

## 2023-08-07 NOTE — H&P
[facility]  Hospitalist H&P    Name: Floridalma Cunningham      MRN: [id number]  YOB: 1935    Age: 88 year old  Date of admission: 8/6/2023  Primary care provider: No Ref-Primary, Physician            Assessment and Plan:     Floridalma Cunningham is a 88 year old female with a history of hypertension, hyperlipidemia, arthritis, chronic kidney disease who presents with confusion and abdominal pain.    Acute encephalopathy: Unclear etiology but appears to be onset this morning with gradual worsening throughout the course of the day.  She is incoherent at the bedside.  She is completely disoriented and agitated.  At baseline she has no cognitive deficits per her daughters.  She is afebrile but does have a leukocytosis and I have some suspicion regarding infectious encephalopathy although no clear source of infection has been identified (urinalysis is pending).  Given her recent fall and possible head trauma she could have some degree of concussion/TBI.  There is been no reports of seizure.  CT and CTA show no vessel occlusion or significant intracranial abnormality.  No obvious focal neurological deficits although cannot rule out a stroke without an MRI.  Nevertheless an acute stroke seems fairly unlikely.  Stroke neurology was contacted from the ED and recommended an MRI when her behavioral issues subside.  For now we will have IV Haldol and IM Zyprexa available.  I would avoid benzodiazepines if possible.  We will continue to treat for possible infection and monitor her mental status closely.  If her behavioral issues worsen she can be transferred to the ICU and started on an Precedex infusion.  Abdominal pain: Unclear etiology.  Liver function test and lipase are normal.  CT shows no obvious acute intra-abdominal pathology but there is some motion artifact on the study.  Her abdominal exam is benign.  Pain is in the lower abdomen so could be a component of cystitis/UTI so we will follow-up  her urinalysis and continue empiric antibiotics.  Lactic acidosis, leukocytosis and SIRS: She is presenting as though she has an infection although she is afebrile.  Her only focal symptom appears to be that of abdominal discomfort.  As above, urinalysis is pending.  CT of the abdomen is unremarkable.  Lungs are clear on exam and no basilar findings on CT.  Blood cultures have been sent.  I will check viral respiratory testing for the sake of completion.  We will attempt to obtain a urinalysis and continue empiric Zosyn.  I will check a procalcitonin level.  Will continue IV fluids and trend her lactate.  Possible GI bleeding: Hemoglobin is down to 9.1 from a value of 11 last October.  She has guaiac positive stools and has been experiencing dark emesis.  This is of course confounded by her use the past 2 days of Pepto-Bismol.  Her BUN is significantly elevated which could further support a GI bleed.  For now we will treat for possible GI bleeding with IV Protonix infusion (she did receive an 80 mg bolus in the emergency department.  She will be n.p.o. and on IV fluids.  Like to request a GI consultation.  To note, she did have hematochezia 2 years ago from diverticular hemorrhage requiring colonoscopy and clip placement as well as IR embolization.  She has not had any stools in the emergency department and no abnormality seen on CT.  Acute on chronic anemia: As above, hemoglobin is down to 9.1 from a recent value of 11.  We will continue to check serial hemoglobin levels and transfuse for a value of less than 7 or hemodynamic instability.  Hypertension: Blood pressure is stable but we will hold her lisinopril and hydrochlorothiazide in the context of potentially evolving shock.  Troponin elevation: Troponin is elevated at 41, likely due to demand ischemia from the above related issues including sepsis and GI bleeding.  We will continue to trend troponin levels.  Not a current candidate for acute coronary syndrome  treatment with possible acute bleeding.    Clinically Significant Risk Factors Present on Admission              # Hypoalbuminemia: Lowest albumin = 3.4 g/dL at 8/7/2023 12:04 AM, will monitor as appropriate  # Coagulation Defect: INR = 1.29 (Ref range: 0.85 - 1.15) and/or PTT = 26 Seconds (Ref range: 22 - 38 Seconds), will monitor for bleeding  # Drug Induced Platelet Defect: home medication list includes an antiplatelet medication   # Hypertension: Noted on problem list                     Code status: DNR/DNI, discussed with the patient's daughters at the bedside and consensus is for this CODE STATUS.  Admit to inpatient status.  Prophylaxis: PCD's.  Disposition: Home in 4 to 5 days.    60 MINUTES SPENT BY ME on the date of service doing chart review, history, exam, documentation & further activities per the note.    Addendum: Hb down to 7, clearly appears to have bleeding at this point. Will give 1 unit(s) PRBC.          Chief Complaint:     Abdominal pain.         History of Present Illness:   Floridalma Cunningham is a 88 year old female who presents with abdominal pain.  History was obtained from my discussion with the patient and her 2 daughters at the bedside.  I also discussed the case with the ED provider.  The electronic medical record was also reviewed.    The patient was in [facility] at a cabin for the past week.  Yesterday she started developing some lower abdominal pain.  She has had some vomiting that appeared to be dark in nature.  She has been taking Pepto-Bismol throughout the course of the past 2 days however.  Yesterday she fell in the bathroom and has been confused since that time.  Her confusion has worsened for the past 6 to 8 hours.  She has not had fevers.  She has not had chest pain or shortness of breath.  She does not take ibuprofen or any blood thinners but is on a daily aspirin.  Her pain is worsened for the past few days so she had actually doubled her dose of gabapentin since  Tuesday.  Her family drove back from [facility] and took her to the hospital for evaluation.    Here her temperature is 98, heart rate 67, blood pressure 109/71, respirate rate 18 and oxygen saturation 100% on room air.  Labs show normal basic metabolic panel except for a BUN of 79 and a creatinine of 0.77.  Bicarb is 20 and anion gap is 17.  Lactate is 4.8.  Liver function tests are normal.  Guaiac is positive.  Troponin is elevated at 41.  CBC shows white blood cells of 20 and hemoglobin of 9.1.  INR is 1.3.  Urinalysis is pending.  Abdominal and pelvic CT shows no acute abnormality.  CT and CTA show no abnormality.  The patient was given IV fluids, Zosyn, and Protonix.  She will be admitted.            Past Medical History:     Past Medical History:   Diagnosis Date    Arthritis     Hyperlipidemia     Hypertension     PVC's (premature ventricular contractions)     RBBB (right bundle branch block)              Past Surgical History:     Past Surgical History:   Procedure Laterality Date    ARTHROPLASTY KNEE Right 2021    Procedure: RIGHT TOTAL KNEE ARTHROPLASTY;  Surgeon: Eamon Awan MD;  Location: [facility] OR    IR VISCERAL ANGIOGRAM  2021             Social History:     Social History     Tobacco Use    Smoking status: Former     Types: Cigarettes     Quit date: 1945     Years since quittin.2    Smokeless tobacco: Never    Tobacco comments:     smoked for two years   Substance Use Topics    Alcohol use: Yes     Comment: rarely             Family History:   The family history was fully reviewed and non-contributory in this case.         Allergies:     Allergies   Allergen Reactions    Sulfa Antibiotics              Medications:     Prior to Admission medications    Medication Sig Last Dose Taking? Auth Provider Long Term End Date   acetaminophen (TYLENOL) 325 MG tablet Take 650 mg by mouth every 8 hours as needed   Patient not taking: Reported on 2022   Reported, Patient      aspirin (ASA) 81 MG EC tablet Take 81 mg by mouth  Patient not taking: Reported on 7/18/2022   Reported, Patient     atorvastatin (LIPITOR) 10 MG tablet TAKE 1 TABLET BY MOUTH EVERY DAY   Dinah Green MD Yes    bismuth subsalicylate (PEPTO BISMOL) 262 MG chewable tablet Take 1-2 tablets every 4 hours as needed for heartburn   Alex Whitten MD     calcium carbonate (TUMS) 500 MG chewable tablet Take 1 chew tab by mouth 2 times daily as needed for heartburn   Unknown, Entered By History     CRANBERRY PO Take 1 tablet by mouth daily   Reported, Patient     gabapentin (NEURONTIN) 100 MG capsule Take 1 capsule (100 mg) by mouth 3 times daily   Aaseby-Aguilera, Ramona Ann, PA-C Yes    glucosamine-chondroitin 500-400 MG CAPS per capsule Take 1 capsule by mouth  Patient not taking: Reported on 7/18/2022   Reported, Patient     lisinopril (ZESTRIL) 10 MG tablet TAKE 1 TABLET (10 MG) BY MOUTH DAILY.   Aaseby-Aguilera, Ramona Ann, PA-C Yes    multivitamin w/minerals (THERA-VIT-M) tablet Take 1 tablet by mouth daily   Reported, Patient     Omega-3 Fatty Acids (FISH OIL PO) Take 1,000 mg by mouth daily    Reported, Patient     pregabalin (LYRICA) 50 MG capsule Take 1 capsule (50 mg) by mouth 2 times daily   Bhavesh Livingston, DO Yes    traMADol (ULTRAM) 50 MG tablet Take 1 tablet (50 mg) by mouth 2 times daily as needed for moderate to severe pain   Bhavesh Livingston, DO No    trimethoprim (TRIMPEX) 100 MG tablet Take 1 tablet (100 mg) by mouth daily   Aaseby-Aguilera, Ramona Ann, PA-C               Review of Systems:     A Comprehensive greater than 10 system review of systems was carried out.  Pertinent positives and negatives are noted above.  Otherwise negative for contributory information.           Physical Exam:   Blood pressure 110/53, pulse 103, temperature 98  F (36.7  C), temperature source Temporal, resp. rate 19, SpO2 96 %, not currently breastfeeding.  Wt Readings from Last 1 Encounters:   06/07/22 67.6 kg (149  lb)     Exam:  GENERAL: No apparent distress. Awake, alert, but not oriented and confused.  HEENT: Normocephalic, atraumatic. Extraocular movements intact.  CARDIOVASCULAR: Regular rate and rhythm without murmurs or rubs. No S3.  PULMONARY: Clear to auscultation bilaterally.  ABDOMINAL: Soft, non-tender, non-distended. Bowel sounds normoactive.   EXTREMITIES: No cyanosis or clubbing. No appreciable edema.  NEUROLOGICAL: CN 2-12 grossly intact, no focal neurological deficits.  DERMATOLOGICAL: No rash, ulcer, bruising, nor jaundice.          Data:   EKG:  Personally reviewed.     Laboratory:  Recent Labs   Lab 08/07/23  0004   WBC 20.1*   HGB 9.1*   HCT 28.5*   MCV 92        Recent Labs   Lab 08/07/23  0004 08/06/23  2344     --    POTASSIUM 4.3  --    CHLORIDE 103  --    CO2 20*  --    ANIONGAP 17*  --    * 110*   BUN 79.1*  --    CR 0.77  --    GFRESTIMATED 74  --    VLADIMIR 9.8  --      Recent Labs   Lab 08/07/23  0004   AST 26   ALT 24   ALKPHOS 83   BILITOTAL 0.5     Recent Labs   Lab 08/07/23  0029   LACT 4.8*     Recent Labs   Lab 08/07/23  0004   LIPASE 24     No results for input(s): CULT in the last 168 hours.    Imaging:  Recent Results (from the past 24 hour(s))   CT Head w/o Contrast    Narrative    EXAM: CT HEAD W/O CONTRAST, CTA HEAD NECK W CONTRAST  LOCATION: [facility]  DATE/TIME: 8/7/2023 1:21 AM CDT    INDICATION: Code stroke; altered mental status, nausea and vomiting  COMPARISON: None.  CONTRAST: 75 mL Isovue 370  TECHNIQUE: Head and neck CT angiogram with IV contrast. Noncontrast head CT followed by axial helical CT images of the head and neck vessels obtained during the arterial phase of intravenous contrast administration. Axial 2D reconstructed images and   multiplanar 3D MIP reconstructed images of the head and neck vessels were performed by the technologist. Dose reduction techniques were used. All stenosis measurements made according to NASCET criteria  unless otherwise specified.    FINDINGS:   NONCONTRAST HEAD CT:   INTRACRANIAL CONTENTS: No intracranial hemorrhage, extraaxial collection, or mass effect.  No CT evidence of acute infarct. Mild to moderate presumed chronic small vessel ischemic changes. Mild to moderate generalized volume loss. No hydrocephalus.     VISUALIZED ORBITS/SINUSES/MASTOIDS: Prior bilateral cataract surgery. Visualized portions of the orbits are otherwise unremarkable. No significant paranasal sinus mucosal disease. No middle ear or mastoid effusion.    BONES/SOFT TISSUES: No acute abnormality.    HEAD CTA:  Dolichoectatic intracranial vasculature.    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 No occlusion, aneurysm, or high flow vascular malformation. Right PCOM infundibulum. Standard [place] anatomy.    POSTERIOR CIRCULATION: No stenosis/occlusion, aneurysm, or high flow vascular malformation. Balanced vertebral arteries supply a normal basilar artery.     DURAL VENOUS SINUSES: Expected enhancement of the major dural venous sinuses.    NECK CTA:  RIGHT CAROTID: Calcified atherosclerotic plaque results in less than 25% stenosis in the proximal ICA. No dissection.    LEFT CAROTID: No measurable stenosis or dissection.    VERTEBRAL ARTERIES: No focal stenosis or dissection. Balanced vertebral arteries.    AORTIC ARCH: Bovine origin left common carotid artery. No significant stenosis at the origin of the great vessels.    NONVASCULAR STRUCTURES: Unremarkable.      Impression    IMPRESSION:   HEAD CT:  1.  No CT evidence for acute intracranial process.  2.  Brain atrophy and presumed chronic microvascular ischemic changes as above.    HEAD CTA:   1.  No large vessel occlusion or significant proximal branch stenosis.    NECK CTA:  1.  No large vessel occlusion or hemodynamically significant stenosis.   CTA Head Neck with Contrast    Narrative    EXAM: CT HEAD W/O CONTRAST, CTA HEAD NECK W  CONTRAST  LOCATION: [facility]  DATE/TIME: 8/7/2023 1:21 AM CDT    INDICATION: Code stroke; altered mental status, nausea and vomiting  COMPARISON: None.  CONTRAST: 75 mL Isovue 370  TECHNIQUE: Head and neck CT angiogram with IV contrast. Noncontrast head CT followed by axial helical CT images of the head and neck vessels obtained during the arterial phase of intravenous contrast administration. Axial 2D reconstructed images and   multiplanar 3D MIP reconstructed images of the head and neck vessels were performed by the technologist. Dose reduction techniques were used. All stenosis measurements made according to NASCET criteria unless otherwise specified.    FINDINGS:   NONCONTRAST HEAD CT:   INTRACRANIAL CONTENTS: No intracranial hemorrhage, extraaxial collection, or mass effect.  No CT evidence of acute infarct. Mild to moderate presumed chronic small vessel ischemic changes. Mild to moderate generalized volume loss. No hydrocephalus.     VISUALIZED ORBITS/SINUSES/MASTOIDS: Prior bilateral cataract surgery. Visualized portions of the orbits are otherwise unremarkable. No significant paranasal sinus mucosal disease. No middle ear or mastoid effusion.    BONES/SOFT TISSUES: No acute abnormality.    HEAD CTA:  Dolichoectatic intracranial vasculature.    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 No occlusion, aneurysm, or high flow vascular malformation. Right PCOM infundibulum. Standard [place] anatomy.    POSTERIOR CIRCULATION: No stenosis/occlusion, aneurysm, or high flow vascular malformation. Balanced vertebral arteries supply a normal basilar artery.     DURAL VENOUS SINUSES: Expected enhancement of the major dural venous sinuses.    NECK CTA:  RIGHT CAROTID: Calcified atherosclerotic plaque results in less than 25% stenosis in the proximal ICA. No dissection.    LEFT CAROTID: No measurable stenosis or dissection.    VERTEBRAL ARTERIES:  No focal stenosis or dissection. Balanced vertebral arteries.    AORTIC ARCH: Bovine origin left common carotid artery. No significant stenosis at the origin of the great vessels.    NONVASCULAR STRUCTURES: Unremarkable.      Impression    IMPRESSION:   HEAD CT:  1.  No CT evidence for acute intracranial process.  2.  Brain atrophy and presumed chronic microvascular ischemic changes as above.    HEAD CTA:   1.  No large vessel occlusion or significant proximal branch stenosis.    NECK CTA:  1.  No large vessel occlusion or hemodynamically significant stenosis.   CT Abdomen Pelvis w Contrast    Narrative    EXAM: CT ABDOMEN PELVIS W CONTRAST  LOCATION: [facility]  DATE: 8/7/2023    INDICATION: abd pain, sepsis  COMPARISON: None.  TECHNIQUE: CT scan of the abdomen and pelvis was performed following injection of IV contrast. Multiplanar reformats were obtained. Dose reduction techniques were used.  CONTRAST: 75 mL Isovue 370    FINDINGS:   LOWER CHEST: Large hiatal hernia. Respiratory motion obscures details. Right hemidiaphragm is not well seen secondary to respiratory motion. There is interval mild to moderate elevation of the right hemidiaphragm    HEPATOBILIARY: Calcified gallstones in the gallbladder. Fatty infiltration the liver. Stable low-attenuation foci in the liver most consistent with cysts. No follow-up needed.    PANCREAS: No significant mass, duct dilatation, or inflammatory change.    SPLEEN: Normal.    ADRENAL GLANDS: Normal.    KIDNEYS/BLADDER: Right renal cyst. No follow-up needed. No significant mass, stone, or hydronephrosis.    BOWEL: Respiratory motion obscures details. Nonspecific nonobstructive bowel gas pattern. Multiple colonic diverticula, greatest in the sigmoid colon. No definite CT evidence for diverticulitis but respiratory motion obscures details. Appendix not   visualized    LYMPH NODES: No lymphadenopathy.    VASCULATURE: No abdominal aortic aneurysm. Moderate  to advanced aortoiliac vascular calcifications    PELVIC ORGANS: No pelvic masses.    MUSCULOSKELETAL: Moderate thoracic and lumbar spondylosis.      Impression    IMPRESSION:   1.  Respiratory motion obscures details. Nonspecific nonobstructive bowel gas pattern. Multiple colonic diverticula, greatest in the sigmoid colon. No definite CT evidence for diverticulitis but respiratory motion obscures details. Appendix not   visualized    2.  Calcified gallstones in the gallbladder.     3.  Fatty infiltration the liver.     4.  Stable low-attenuation foci in the liver most consistent with cysts. No follow-up needed.    5.  No abdominal aortic aneurysm. Moderate to advanced aortoiliac vascular calcifications    6.  Right renal cyst. No follow-up needed. No significant mass, stone, or hydronephrosis.       Aristeo Blanco DO MPH  [facility] Hospitalist  [address] [place] [address]  08/07/2023

## 2023-08-07 NOTE — ANESTHESIA CARE TRANSFER NOTE
Patient: Floridalma Cunningham    Procedure: Procedure(s):  ESOPHAGOGASTRODUODENOSCOPY       Diagnosis: Acute upper GI bleed [K92.2]  Diagnosis Additional Information: No value filed.    Anesthesia Type:   MAC     Note:      Level of Consciousness: drowsy  Oxygen Supplementation: face mask    Independent Airway: airway patency satisfactory and stable  Dentition: dentition unchanged  Vital Signs Stable: post-procedure vital signs reviewed and stable  Report to RN Given: handoff report given  Patient transferred to: PACU    Handoff Report: Identifed the Patient, Identified the Reponsible Provider, Reviewed the pertinent medical history, Discussed the surgical course, Reviewed Intra-OP anesthesia mangement and issues during anesthesia, Set expectations for post-procedure period and Allowed opportunity for questions and acknowledgement of understanding      Vitals:  Vitals Value Taken Time   BP     Temp     Pulse     Resp     SpO2         Electronically Signed By: ALEXIS Kamara CRNA  August 7, 2023  1:57 PM

## 2023-08-07 NOTE — CONSULTS
[facility]    Stroke Telephone Note    I was called by Gibran Navarro on 08/06/23 regarding patient Floridalma Cunningham. The patient is a 88 year old female with history of HTN, CKD who presented with fall and altered mental status. Earlier in the evening, she had some nausea and vomiting. She had a controlled fall in the bathroom and since then, she has been confused. Appears to be aphasic vs encephalopathic on exam, no other focal deficits per ED physician.     /71   Pulse 67   Temp 98  F (36.7  C) (Temporal)   Resp 18   SpO2 100%      Stroke Code Data (for stroke code without tele)  Stroke code activated 08/06/23   2339   Stroke provider first response  08/06/23   2341            Last known normal 08/06/23   1800        Time of discovery   (or onset of symptoms) 08/06/23   1800   Head CT read by Stroke Neuro Dr/Provider 08/06/23    (Delayed due to patient agitation)   Was stroke code de-escalated?                Imaging Findings  HEAD CT:  1.  No CT evidence for acute intracranial process.  2.  Brain atrophy and presumed chronic microvascular ischemic changes as above.     HEAD CTA:   1.  No large vessel occlusion or significant proximal branch stenosis.     NECK CTA:  1.  No large vessel occlusion or hemodynamically significant stenosis.    Intravenous Thrombolysis  Not given due to:   - unclear or unfavorable risk-benefit profile for extended window thrombolysis beyond the conventional 4.5 hour time window    Endovascular Treatment  Not initiated due to absence of proximal vessel occlusion    Impression  Encephalopathy likely secondary to stroke vs other metabolic causes    Recommendations   MRI Brain wwo contrast when able    My recommendations are based on the information provided over the phone by Floridalma Dixonhussainalbin's in-person providers. They are not intended to replace the clinical judgment of her in-person providers. I was not requested to personally see or examine the patient  "at this time.    Trae Burkett MD       Department of Neurology       Securely message with the [employer] Web Console (learn more here)   To page me or covering stroke neurology team member, click here: [email or web address]  Choose \"On Call\" tab at top, then select \"NEUROLOGY/ALL SITES\" from middle drop-down box, press Enter, then look for \"stroke\" or \"telestroke\" for your site.        "

## 2023-08-07 NOTE — ED NOTES
[facility]  ED Nurse Handoff Report    ED Chief complaint: Fall and Altered Mental Status  . ED Diagnosis:   Final diagnoses:   None       Allergies:   Allergies   Allergen Reactions    Sulfa Antibiotics        Code Status: DNR / DNI    Activity level - Baseline/Home:  independent.  Activity Level - Current:   in bed.   Lift room needed: No.   Bariatric: No   Needed: No   Isolation: No.   Infection: Not Applicable.     Respiratory status: Room air    Vital Signs (within 30 minutes):   Vitals:    08/06/23 2240 08/07/23 0126 08/07/23 0141 08/07/23 0211   BP: 109/71 (!) 106/90  110/53   Pulse: 67 116 111 103   Resp: 18 24 30 19   Temp: 98  F (36.7  C)      TempSrc: Temporal      SpO2: 100% 95% 96% 96%       Cardiac Rhythm:  ,      Pain level:    Patient confused: Yes.   Patient Falls Risk: patient and family education, activity supervised, and room door open.   Elimination Status: Has voided     Patient Report - Initial Complaint: Fall, altered mental status.   Focused Assessment: Floridalma Cunningham is a 88 year old female with history of hypertension, hyperlipidemia, RBBB, and CKD who presents following a fall and an altered mental status. Per the patient's daughter, she was out of town in a cabin when she started feeling abdominal pain. She fell in the bathroom and has been very incoherent and confused ever since which is unusual for her. She didn't hit her head or lose consciousness. She also had diarrhea and vomiting. While in the car back from the cabin, she was trying to get out of the car. This all happened around 6 hours ago. She has been dong better since arriving in the ED but still confused. She started doubling her dose of Gabapentin since Tuesday. Denies pain anywhere, slurring, vision issues, urinary issues, fever, chest pain, cough, or blood in vomit or stool. Denies any history of blood clots, strokes, or atrial fibrillation.       Abnormal Results:   Labs Ordered and  Resulted from Time of ED Arrival to Time of ED Departure   BASIC METABOLIC PANEL - Abnormal       Result Value    Sodium 140      Potassium 4.3      Chloride 103      Carbon Dioxide (CO2) 20 (*)     Anion Gap 17 (*)     Urea Nitrogen 79.1 (*)     Creatinine 0.77      Calcium 9.8      Glucose 108 (*)     GFR Estimate 74     INR - Abnormal    INR 1.29 (*)    TROPONIN T, HIGH SENSITIVITY - Abnormal    Troponin T, High Sensitivity 41 (*)    GLUCOSE BY METER - Abnormal    GLUCOSE BY METER POCT 110 (*)    CBC WITH PLATELETS AND DIFFERENTIAL - Abnormal    WBC Count 20.1 (*)     RBC Count 3.11 (*)     Hemoglobin 9.1 (*)     Hematocrit 28.5 (*)     MCV 92      MCH 29.3      MCHC 31.9      RDW 15.7 (*)     Platelet Count 232      % Neutrophils 82      % Lymphocytes 12      % Monocytes 5      % Eosinophils 0      % Basophils 0      % Immature Granulocytes 1      NRBCs per 100 WBC 0      Absolute Neutrophils 16.6 (*)     Absolute Lymphocytes 2.3      Absolute Monocytes 1.1      Absolute Eosinophils 0.0      Absolute Basophils 0.0      Absolute Immature Granulocytes 0.1      Absolute NRBCs 0.0     HEPATIC FUNCTION PANEL - Abnormal    Protein Total 6.1 (*)     Albumin 3.4 (*)     Bilirubin Total 0.5      Alkaline Phosphatase 83      AST 26      ALT 24      Bilirubin Direct <0.20     ISTAT GASES LACTATE VENOUS POCT - Abnormal    Lactic Acid POCT 4.8 (*)     Bicarbonate Venous POCT 22      O2 Sat, Venous POCT 57 (*)     pCO2 Venous POCT 32 (*)     pH Venous POCT 7.44 (*)     pO2 Venous POCT 28     PARTIAL THROMBOPLASTIN TIME - Normal    aPTT 26     LIPASE - Normal    Lipase 24     GLUCOSE MONITOR NURSING POCT   ROUTINE UA WITH MICROSCOPIC REFLEX TO CULTURE   OCCULT BLOOD STOOL   LACTIC ACID WHOLE BLOOD   C. DIFFICILE TOXIN B PCR WITH REFLEX TO C. DIFFICILE ANTIGEN AND TOXINS A/B EIA   ENTERIC BACTERIA AND VIRUS PANEL BY PCR   BLOOD CULTURE   BLOOD CULTURE   ABO/RH TYPE AND SCREEN        CT Abdomen Pelvis w Contrast   Final Result    IMPRESSION:    1.  Respiratory motion obscures details. Nonspecific nonobstructive bowel gas pattern. Multiple colonic diverticula, greatest in the sigmoid colon. No definite CT evidence for diverticulitis but respiratory motion obscures details. Appendix not    visualized      2.  Calcified gallstones in the gallbladder.       3.  Fatty infiltration the liver.       4.  Stable low-attenuation foci in the liver most consistent with cysts. No follow-up needed.      5.  No abdominal aortic aneurysm. Moderate to advanced aortoiliac vascular calcifications      6.  Right renal cyst. No follow-up needed. No significant mass, stone, or hydronephrosis.      CTA Head Neck with Contrast   Final Result   IMPRESSION:    HEAD CT:   1.  No CT evidence for acute intracranial process.   2.  Brain atrophy and presumed chronic microvascular ischemic changes as above.      HEAD CTA:    1.  No large vessel occlusion or significant proximal branch stenosis.      NECK CTA:   1.  No large vessel occlusion or hemodynamically significant stenosis.      CT Head w/o Contrast   Final Result   IMPRESSION:    HEAD CT:   1.  No CT evidence for acute intracranial process.   2.  Brain atrophy and presumed chronic microvascular ischemic changes as above.      HEAD CTA:    1.  No large vessel occlusion or significant proximal branch stenosis.      NECK CTA:   1.  No large vessel occlusion or hemodynamically significant stenosis.          Treatments provided: see MAR  Family Comments: 2 daughters at bedside  OBS brochure/video discussed/provided to patient:  N/A  ED Medications:   Medications   piperacillin-tazobactam (ZOSYN) 4.5 g vial to attach to  mL bag (4.5 g Intravenous $New Bag 8/7/23 0219)   pantoprazole (PROTONIX) 80 mg in sodium chloride 0.9 % 100 mL infusion (has no administration in time range)   0.9% sodium chloride BOLUS (1,000 mLs Intravenous $New Bag 8/7/23 0209)   iopamidol (ISOVUE-370) solution 500 mL (75 mLs Intravenous  "$Given 8/7/23 0115)   sodium chloride (PF) 0.9% PF flush 100 mL (95 mLs Intravenous $Given 8/7/23 0115)   LORazepam (ATIVAN) injection 0.5 mg (0.5 mg Intravenous $Given 8/7/23 0022)   0.9% sodium chloride BOLUS (0 mLs Intravenous Stopped 8/7/23 0207)   LORazepam (ATIVAN) injection 0.5 mg (0.5 mg Intravenous $Given 8/7/23 0055)   pantoprazole (PROTONIX) IV push injection 80 mg (80 mg Intravenous $Given 8/7/23 0219)       Drips infusing:  Yes  For the majority of the shift this patient was Yellow.   Interventions performed were  Verbal Redirection, daughters preventing pt from pulling on IV.    Sepsis treatment initiated: Yes    Per the ED Provider, Time Zero for severe sepsis or septic shock is:  0049    3 Hour Severe Sepsis Bundle Completion:  1. Initial Lactic Acid Result:   Recent Labs   Lab Test 08/07/23  0029 10/26/21  1125   LACT 4.8* 1.5     2. Blood Cultures before Antibiotics: Yes  3. Broad Spectrum Antibiotics Administered:     Anti-infectives (From now, onward)      Start     Dose/Rate Route Frequency Ordered Stop    08/07/23 0100  piperacillin-tazobactam (ZOSYN) 4.5 g vial to attach to  mL bag        Note to Pharmacy: For [facility], [facility] and [facility]: For Zosyn-naive patients, use the \"Zosyn initial dose + extended infusion\" order panel.    4.5 g  over 30 Minutes Intravenous ONCE 08/07/23 0055            4. 1500 ml of IV fluids have been given so far      6 Hour Severe Sepsis Bundle Completion:    1. Repeat Lactic Acid Level: Not drawn  2. Patient currently on Vasopressors =  No    Cares/treatment/interventions/medications to be completed following ED care:     ED Nurse Name: Dara Montes RN  2:32 AM    "

## 2023-08-07 NOTE — ED PROVIDER NOTES
History     Chief Complaint:  Fall and Altered Mental Status       HPI   Floridalma Cunningham is a 88 year old female with history of hypertension, hyperlipidemia, RBBB, and CKD who presents following a fall and an altered mental status. Per the patient's daughter, she was out of town in a cabin when she started feeling abdominal pain. She fell in the bathroom and has been very incoherent and confused ever since which is unusual for her.  The fall was witnessed by family and they note that she didn't hit her head or lose consciousness. She also has had diarrhea and vomiting today.  She was given 2 doses of Pepto-Bismol throughout the day.  Her vomit and diarrhea has been black. While in the car back from the cabin, she was confused and trying to get out of the car. This all happened around 6 hours ago. She has been doing better since arriving in the ED but still confused and not at her baseline. She started doubling her dose of Gabapentin since Tuesday. Denies pain anywhere, slurring her speech, vision issues, urinary issues, fever, chest pain, cough, or blood in vomit or stool. Denies any history of blood clots, strokes, or atrial fibrillation.     Independent Historian:   Daughter - They report as reported above.    Review of External Notes:   Reviewed discharge summary from 11/4/2021.  Patient was admitted at that time for a GI bleed and found to have diverticular bleeding.  This was treated with clips and IR embolization.      Medications:    Aspirin 81  Lipitor  Pepto bismol  Neurontin  Zestril  Lyrica  Ultram  Trimpex  Lisinopril  Hydrochlorothiazide  Senokot S  Glycolax  Zofran  Norco    Past Medical History:    Arthritis  Hyperlipidemia  Hypertension  PVC  RBBB  CKD  Hematochezia  Osteoarthritis of both knees  Actinic keratosis  Pure hypercholesterolemia  Colonic polyps    Past Surgical History:    Right total knee arthroplasty  IR visceral angiogram  Bunionectomy       Physical Exam   Patient Vitals for the  past 24 hrs:   BP Temp Temp src Pulse Resp SpO2   08/07/23 0211 110/53 -- -- 103 19 96 %   08/07/23 0141 -- -- -- 111 30 96 %   08/07/23 0126 (!) 106/90 -- -- 116 24 95 %   08/06/23 2240 109/71 98  F (36.7  C) Temporal 67 18 100 %        Physical Exam  Vitals and nursing note reviewed.   Constitutional:       General: She is not in acute distress.     Appearance: She is ill-appearing. She is not toxic-appearing.   HENT:      Head: Normocephalic and atraumatic.      Right Ear: External ear normal.      Left Ear: External ear normal.      Nose: Nose normal.      Mouth/Throat:      Mouth: Mucous membranes are dry.      Comments: Black material coating tongue  Eyes:      General: No visual field deficit.     Extraocular Movements: Extraocular movements intact.      Conjunctiva/sclera: Conjunctivae normal.      Pupils: Pupils are equal, round, and reactive to light.   Cardiovascular:      Rate and Rhythm: Tachycardia present. Rhythm irregular.      Heart sounds: No murmur heard.  Pulmonary:      Effort: Pulmonary effort is normal. No respiratory distress.      Breath sounds: Normal breath sounds. No wheezing, rhonchi or rales.   Abdominal:      General: Abdomen is flat. Bowel sounds are normal. There is no distension.      Palpations: Abdomen is soft.      Tenderness: There is no abdominal tenderness. There is no guarding or rebound.   Genitourinary:     Rectum: Guaiac result positive (Black stool).   Musculoskeletal:         General: No deformity or signs of injury.      Cervical back: Normal range of motion and neck supple.   Skin:     General: Skin is warm and dry.      Coloration: Skin is pale.      Findings: No rash.   Neurological:      Mental Status: She is alert. She is confused.      GCS: GCS eye subscore is 4. GCS verbal subscore is 3. GCS motor subscore is 6.      Cranial Nerves: Cranial nerves 2-12 are intact. No dysarthria or facial asymmetry.      Sensory: Sensation is intact.      Motor: No weakness.       Comments: Patient seems to have mild expressive aphasia, with word finding difficulty at times and difficulty naming objects           Emergency Department Course   ECG  ECG taken at 2346, ECG read at 2346  Sinus tachycardia with frequent premature ventricular complexes  Right bundle branch block   No significant change as compared to prior, dated 06/01/2021.  Rate 110 bpm. MO interval 188 ms. QRS duration 116 ms. QT/QTc 360/487 ms. P-R-T axes -13 -1 -5.     Imaging:  CT Abdomen Pelvis w Contrast   Final Result   IMPRESSION:    1.  Respiratory motion obscures details. Nonspecific nonobstructive bowel gas pattern. Multiple colonic diverticula, greatest in the sigmoid colon. No definite CT evidence for diverticulitis but respiratory motion obscures details. Appendix not    visualized      2.  Calcified gallstones in the gallbladder.       3.  Fatty infiltration the liver.       4.  Stable low-attenuation foci in the liver most consistent with cysts. No follow-up needed.      5.  No abdominal aortic aneurysm. Moderate to advanced aortoiliac vascular calcifications      6.  Right renal cyst. No follow-up needed. No significant mass, stone, or hydronephrosis.      CTA Head Neck with Contrast   Final Result   IMPRESSION:    HEAD CT:   1.  No CT evidence for acute intracranial process.   2.  Brain atrophy and presumed chronic microvascular ischemic changes as above.      HEAD CTA:    1.  No large vessel occlusion or significant proximal branch stenosis.      NECK CTA:   1.  No large vessel occlusion or hemodynamically significant stenosis.      CT Head w/o Contrast   Final Result   IMPRESSION:    HEAD CT:   1.  No CT evidence for acute intracranial process.   2.  Brain atrophy and presumed chronic microvascular ischemic changes as above.      HEAD CTA:    1.  No large vessel occlusion or significant proximal branch stenosis.      NECK CTA:   1.  No large vessel occlusion or hemodynamically significant stenosis.          Report per radiology    Laboratory:  Labs Ordered and Resulted from Time of ED Arrival to Time of ED Departure   BASIC METABOLIC PANEL - Abnormal       Result Value    Sodium 140      Potassium 4.3      Chloride 103      Carbon Dioxide (CO2) 20 (*)     Anion Gap 17 (*)     Urea Nitrogen 79.1 (*)     Creatinine 0.77      Calcium 9.8      Glucose 108 (*)     GFR Estimate 74     INR - Abnormal    INR 1.29 (*)    TROPONIN T, HIGH SENSITIVITY - Abnormal    Troponin T, High Sensitivity 41 (*)    GLUCOSE BY METER - Abnormal    GLUCOSE BY METER POCT 110 (*)    CBC WITH PLATELETS AND DIFFERENTIAL - Abnormal    WBC Count 20.1 (*)     RBC Count 3.11 (*)     Hemoglobin 9.1 (*)     Hematocrit 28.5 (*)     MCV 92      MCH 29.3      MCHC 31.9      RDW 15.7 (*)     Platelet Count 232      % Neutrophils 82      % Lymphocytes 12      % Monocytes 5      % Eosinophils 0      % Basophils 0      % Immature Granulocytes 1      NRBCs per 100 WBC 0      Absolute Neutrophils 16.6 (*)     Absolute Lymphocytes 2.3      Absolute Monocytes 1.1      Absolute Eosinophils 0.0      Absolute Basophils 0.0      Absolute Immature Granulocytes 0.1      Absolute NRBCs 0.0     HEPATIC FUNCTION PANEL - Abnormal    Protein Total 6.1 (*)     Albumin 3.4 (*)     Bilirubin Total 0.5      Alkaline Phosphatase 83      AST 26      ALT 24      Bilirubin Direct <0.20     ISTAT GASES LACTATE VENOUS POCT - Abnormal    Lactic Acid POCT 4.8 (*)     Bicarbonate Venous POCT 22      O2 Sat, Venous POCT 57 (*)     pCO2 Venous POCT 32 (*)     pH Venous POCT 7.44 (*)     pO2 Venous POCT 28     OCCULT BLOOD STOOL - Abnormal    Occult Blood Positive (*)    PARTIAL THROMBOPLASTIN TIME - Normal    aPTT 26     LIPASE - Normal    Lipase 24     GLUCOSE MONITOR NURSING POCT   ROUTINE UA WITH MICROSCOPIC REFLEX TO CULTURE   LACTIC ACID WHOLE BLOOD   C. DIFFICILE TOXIN B PCR WITH REFLEX TO C. DIFFICILE ANTIGEN AND TOXINS A/B EIA   ENTERIC BACTERIA AND VIRUS PANEL BY PCR   BLOOD  CULTURE   BLOOD CULTURE   ABO/RH TYPE AND SCREEN          Emergency Department Course & Assessments:    Interventions:  Medications   pantoprazole (PROTONIX) 80 mg in sodium chloride 0.9 % 100 mL infusion (has no administration in time range)   0.9% sodium chloride BOLUS (1,000 mLs Intravenous $New Bag 8/7/23 0209)   iopamidol (ISOVUE-370) solution 500 mL (75 mLs Intravenous $Given 8/7/23 0115)   sodium chloride (PF) 0.9% PF flush 100 mL (95 mLs Intravenous $Given 8/7/23 0115)   LORazepam (ATIVAN) injection 0.5 mg (0.5 mg Intravenous $Given 8/7/23 0022)   0.9% sodium chloride BOLUS (0 mLs Intravenous Stopped 8/7/23 0207)   piperacillin-tazobactam (ZOSYN) 4.5 g vial to attach to  mL bag (4.5 g Intravenous $New Bag 8/7/23 0219)   LORazepam (ATIVAN) injection 0.5 mg (0.5 mg Intravenous $Given 8/7/23 0055)   pantoprazole (PROTONIX) IV push injection 80 mg (80 mg Intravenous $Given 8/7/23 0219)        Assessments:  2324 I examined the patient and obtained history as noted above.  2337 I called a tier 2 stroke.  0116 I rechecked and updated the patient. I performed a rectal exam.  0153 I rechecked and updated the patient.  0210 I rechecked and updated the patient.    Independent Interpretation (X-rays, CTs, rhythm strip):  None    Consultations/Discussion of Management or Tests:  2241 I spoke with Dr. Burkett, stroke neurology, regarding the patient.   0008 I discussed with Dr. Burkett with stroke neurology  0255 I discussed with Aristeo Blanco DO regarding admission    Social Determinants of Health affecting care:   None    Disposition:  The patient was admitted to the hospital under the care of Dr. Aristeo Blanco.     Impression & Plan    CMS Diagnoses: The patient has signs of Severe Sepsis       If one the following conditions is present, a 30 mL/kg bolus is recommended as part of the 6 hour bundle (IBW can be used for BMI >30, or document refusal/contraindication):      1.   Initial hypotension  defined as 2 bps <  "90 or map < 65 in the 6hrs before or 3hrs after time zero.     2.  Lactate >4.      The patient has signs of Severe Sepsis as evidenced by:    1. 2 SIRS criteria, AND  2. Suspected infection, AND   3. Organ dysfunction: Lactic Acidosis with value >2.0 and Acute encephalopathy due to sepsis    Time severe sepsis diagnosis confirmed: 0029  08/07/23 as this was the time when Lactate resulted, and the level was > 2.0    3 Hour Severe Sepsis Bundle Completion:    1. Initial Lactic Acid Result:   Recent Labs   Lab Test 08/07/23  0029 10/26/21  1125   LACT 4.8* 1.5     2. Blood Cultures before Antibiotics: Yes  3. Broad Spectrum Antibiotics Administered:  yes       Anti-infectives (From admission through now)      Start     Dose/Rate Route Frequency Ordered Stop    08/07/23 0100  piperacillin-tazobactam (ZOSYN) 4.5 g vial to attach to  mL bag        Note to Pharmacy: For [facility], [facility] and [facility]: For Zosyn-naive patients, use the \"Zosyn initial dose + extended infusion\" order panel.    4.5 g  over 30 Minutes Intravenous ONCE 08/07/23 0055 08/07/23 0249            4. Is initial hypotension present?     No (IV fluid bolus NOT required). IV Fluid volume administered: 2000 mL                  Severe Sepsis reassessment:  1. Repeat Lactic Acid Level within 6 hours of time zero: pending  2. MAP>65 after initial IVF bolus, will continue to monitor fluid status and vital signs    I attest to having performed a repeat sepsis exam and assessment of perfusion at 0230 and the results demonstrate no change.     and The patient has stroke symptoms:         ED Stroke specific documentation           NIHSS PDF     Patient last known well time: 1700  ED Provider first to bedside at: 2315  CT Results received at: 0121    Thrombolytics:   Not given due to:   - time since onset >4.5 hrs    If treating with thrombolytics: Ensure SBP<180 and DBP<105 prior to treatment with thrombolytics.  Administering thrombolytics after treatment with IV " labetalol, hydralazine, or nicardipine is reasonable once BP control is established.    Endovascular Retrieval:  Not initiated due to absence of proximal vessel occlusion    [employer] Stroke Scale (Baseline)  Time Performed: 2315     Score    Level of consciousness: (0)   Alert, keenly responsive    LOC questions: (2)   Answers neither question correctly    LOC commands: (0)   Performs both tasks correctly    Best gaze: (0)   Normal    Visual: (0)   No visual loss    Facial palsy: (0)   Normal symmetrical movements    Motor arm (left): (0)   No drift    Motor arm (right): (0)   No drift    Motor leg (left): (0)   No drift    Motor leg (right): (0)   No drift    Limb ataxia: (0)   Absent    Sensory: (0)   Normal- no sensory loss    Best language: (1)   Mild to moderate aphasia    Dysarthria: (0)   Normal    Extinction and inattention: (0)   No abnormality        Total Score:  3        Stroke Mimics were considered (including migraine headache, seizure disorder, hypoglycemia (or hyperglycemia), head or spinal trauma, CNS infection, Toxin ingestion and shock state (e.g. sepsis) .    Evaluation/Treatement was delayed due to: patient agitated, requiring meds and multiple attempts to obtain imaging     Medical Decision Makin-year-old female presenting with altered mental status.  She has been confused for the past 6 hours or so and seems to have some aphasia and word finding difficulty at times.  She did have a fall but does not sound like she had a significant head injury.  Aside from the confusion and word finding difficulty, she has no focal neurologic deficits on her exam and her stroke scale is only a 2-3.  Given the time since onset is less than 24 hours but greater than 4.5 hours, a tier 2 stroke code was initially called.  Is unclear whether this is a stroke at this point, it is more likely a encephalopathy from other etiologies such as infection, metabolic abnormality, medication adverse  effect, etc.  Patient's labs began to come back and they were significant for worsening anemia, leukocytosis, elevated lactic acid, elevated BUN.  I suspect that she has a GI bleed given the black stool, elevated BUN, worsening anemia.  She also may be septic with the elevated white blood cell count and lactic acidemia.  She was given IV fluids and cover with broad antibiotics.  CT of the head and cerebral vasculature did not show any bleed or large vessel occlusion.  We also attempted to CT her abdomen and pelvis but this was significantly limited by agitation and motion.  Within those limits there is no significant acute abnormalities.  Patient remained agitated and required Ativan.  Her stool occult blood did come back positive.    0008 I discussed the case with Dr. Burkett with stroke neurology.  He reviewed the imaging and does not see any signs of stroke or vascular occlusion.  He recommends an MRI when we are able to obtain one when patient is less agitated.    0255 I discussed the patient's findings with Dr. Aristeo Blanco with the hospitalist team.  It appears patient has a likely upper GI bleed and possibly sepsis with associated encephalopathy.  Will continue IV fluids and antibiotics and monitor her hemoglobin.  He will plan to admit to telemetry and patient will likely need a sitter given her agitation.    Critical Care time:  was 60 minutes for this patient excluding procedures.    Diagnosis:    ICD-10-CM    1. Acute delirium  R41.0       2. Acute upper GI bleed  K92.2       3. Sepsis with encephalopathy without septic shock, due to unspecified organism (H)  A41.9     R65.20     G93.40            Discharge Medications:  New Prescriptions    No medications on file        Scribe Disclosure:  I, Usman Fernandez, am serving as a scribe at 11:51 PM on 8/6/2023 to document services personally performed by Gibran Navarro MD based on my observations and the provider's statements to me.     8/6/2023   Melanie  MD Melanie Roa Shaun M, MD  08/07/23 [id number]

## 2023-08-07 NOTE — PROGRESS NOTES
No charge note.  Patient seen and examined, chart, medications reviewed. Admitted this morning, agreed with then plan as outlined in H&P by Dr. Aristeo Blanco.  Patient is back from EGD, which showed hiatal hernia with armaan erosions with stigmata of recent bleeding, with old blood in the hernia sac. I discussed briefly with GI, input appreciated, noted recommendation.  She had 1 unit PRBC earlier, repeat Hgb pending.  She has difficult access, and ordered Midline, if unable to place good PIV line.  Mental status seemed to be improving through out the day but still has some confusion and disoriented to time and place  She was straight cath twice with large volume of urine removed.  -Lactate was elevated, she is currently on IV antibiotics Zosyn, UA mildly positive, cultures pending.  Two of her daughters and a granddaughter(a nurse) also at bedside.  I discussed with them the plan of care.

## 2023-08-07 NOTE — ED TRIAGE NOTES
Pt has been confused for about 5 hrs. Pt was in the bathroom and fell 5 hrs ago, family saw her fall and reports she didn't hit her head. Family reports stomach ache and vomiting. Pt uses a walker and is unsteady when up

## 2023-08-08 ENCOUNTER — APPOINTMENT (OUTPATIENT)
Dept: MRI IMAGING | Facility: CLINIC | Age: 88
DRG: 871 | End: 2023-08-08
Attending: INTERNAL MEDICINE
Payer: COMMERCIAL

## 2023-08-08 ENCOUNTER — APPOINTMENT (OUTPATIENT)
Dept: CARDIOLOGY | Facility: CLINIC | Age: 88
DRG: 871 | End: 2023-08-08
Attending: INTERNAL MEDICINE
Payer: COMMERCIAL

## 2023-08-08 LAB
ANION GAP SERPL CALCULATED.3IONS-SCNC: 10 MMOL/L (ref 7–15)
BACTERIA UR CULT: ABNORMAL
BUN SERPL-MCNC: 31.5 MG/DL (ref 8–23)
CALCIUM SERPL-MCNC: 8.3 MG/DL (ref 8.8–10.2)
CHLORIDE SERPL-SCNC: 116 MMOL/L (ref 98–107)
CREAT SERPL-MCNC: 0.71 MG/DL (ref 0.51–0.95)
DEPRECATED HCO3 PLAS-SCNC: 20 MMOL/L (ref 22–29)
ERYTHROCYTE [DISTWIDTH] IN BLOOD BY AUTOMATED COUNT: 16.3 % (ref 10–15)
GFR SERPL CREATININE-BSD FRML MDRD: 81 ML/MIN/1.73M2
GLUCOSE BLDC GLUCOMTR-MCNC: 74 MG/DL (ref 70–99)
GLUCOSE BLDC GLUCOMTR-MCNC: 83 MG/DL (ref 70–99)
GLUCOSE SERPL-MCNC: 106 MG/DL (ref 70–99)
HCT VFR BLD AUTO: 26.2 % (ref 35–47)
HGB BLD-MCNC: 7.8 G/DL (ref 11.7–15.7)
HGB BLD-MCNC: 7.8 G/DL (ref 11.7–15.7)
HGB BLD-MCNC: 7.9 G/DL (ref 11.7–15.7)
HGB BLD-MCNC: 8.5 G/DL (ref 11.7–15.7)
LVEF ECHO: NORMAL
MCH RBC QN AUTO: 29.7 PG (ref 26.5–33)
MCHC RBC AUTO-ENTMCNC: 32.4 G/DL (ref 31.5–36.5)
MCV RBC AUTO: 92 FL (ref 78–100)
PLATELET # BLD AUTO: 160 10E3/UL (ref 150–450)
POTASSIUM SERPL-SCNC: 3.7 MMOL/L (ref 3.4–5.3)
POTASSIUM SERPL-SCNC: 3.7 MMOL/L (ref 3.4–5.3)
RBC # BLD AUTO: 2.86 10E6/UL (ref 3.8–5.2)
SODIUM SERPL-SCNC: 143 MMOL/L (ref 136–145)
SODIUM SERPL-SCNC: 146 MMOL/L (ref 136–145)
WBC # BLD AUTO: 12.4 10E3/UL (ref 4–11)

## 2023-08-08 PROCEDURE — 93325 DOPPLER ECHO COLOR FLOW MAPG: CPT

## 2023-08-08 PROCEDURE — 85014 HEMATOCRIT: CPT | Performed by: HOSPITALIST

## 2023-08-08 PROCEDURE — 93308 TTE F-UP OR LMTD: CPT | Mod: 26 | Performed by: INTERNAL MEDICINE

## 2023-08-08 PROCEDURE — 93325 DOPPLER ECHO COLOR FLOW MAPG: CPT | Mod: 26 | Performed by: INTERNAL MEDICINE

## 2023-08-08 PROCEDURE — 250N000011 HC RX IP 250 OP 636: Mod: JZ | Performed by: HOSPITALIST

## 2023-08-08 PROCEDURE — 255N000002 HC RX 255 OP 636: Performed by: INTERNAL MEDICINE

## 2023-08-08 PROCEDURE — 99233 SBSQ HOSP IP/OBS HIGH 50: CPT | Performed by: INTERNAL MEDICINE

## 2023-08-08 PROCEDURE — 93321 DOPPLER ECHO F-UP/LMTD STD: CPT

## 2023-08-08 PROCEDURE — 258N000003 HC RX IP 258 OP 636: Performed by: HOSPITALIST

## 2023-08-08 PROCEDURE — 36415 COLL VENOUS BLD VENIPUNCTURE: CPT | Performed by: HOSPITALIST

## 2023-08-08 PROCEDURE — 93321 DOPPLER ECHO F-UP/LMTD STD: CPT | Mod: 26 | Performed by: INTERNAL MEDICINE

## 2023-08-08 PROCEDURE — 70553 MRI BRAIN STEM W/O & W/DYE: CPT

## 2023-08-08 PROCEDURE — 36569 INSJ PICC 5 YR+ W/O IMAGING: CPT

## 2023-08-08 PROCEDURE — 84132 ASSAY OF SERUM POTASSIUM: CPT | Performed by: INTERNAL MEDICINE

## 2023-08-08 PROCEDURE — 250N000011 HC RX IP 250 OP 636: Mod: JZ | Performed by: INTERNAL MEDICINE

## 2023-08-08 PROCEDURE — 84295 ASSAY OF SERUM SODIUM: CPT | Performed by: INTERNAL MEDICINE

## 2023-08-08 PROCEDURE — 250N000013 HC RX MED GY IP 250 OP 250 PS 637: Performed by: INTERNAL MEDICINE

## 2023-08-08 PROCEDURE — 85018 HEMOGLOBIN: CPT | Performed by: HOSPITALIST

## 2023-08-08 PROCEDURE — 272N000748 HC KIT, CATH 3FR OR 4FR SINGLE LUMEN POWERMIDLINE

## 2023-08-08 PROCEDURE — 999N000040 HC STATISTIC CONSULT NO CHARGE VASC ACCESS

## 2023-08-08 PROCEDURE — 255N000002 HC RX 255 OP 636: Mod: JZ | Performed by: INTERNAL MEDICINE

## 2023-08-08 PROCEDURE — 200N000001 HC R&B ICU

## 2023-08-08 PROCEDURE — 84132 ASSAY OF SERUM POTASSIUM: CPT | Performed by: HOSPITALIST

## 2023-08-08 PROCEDURE — A9585 GADOBUTROL INJECTION: HCPCS | Mod: JZ | Performed by: INTERNAL MEDICINE

## 2023-08-08 PROCEDURE — 250N000013 HC RX MED GY IP 250 OP 250 PS 637: Performed by: HOSPITALIST

## 2023-08-08 RX ORDER — CEFTRIAXONE 2 G/1
2 INJECTION, POWDER, FOR SOLUTION INTRAMUSCULAR; INTRAVENOUS EVERY 24 HOURS
Status: DISCONTINUED | OUTPATIENT
Start: 2023-08-08 | End: 2023-08-11 | Stop reason: HOSPADM

## 2023-08-08 RX ORDER — GADOBUTROL 604.72 MG/ML
7.5 INJECTION INTRAVENOUS ONCE
Status: COMPLETED | OUTPATIENT
Start: 2023-08-08 | End: 2023-08-08

## 2023-08-08 RX ORDER — HALOPERIDOL 5 MG/ML
2 INJECTION INTRAMUSCULAR EVERY 6 HOURS PRN
Status: DISCONTINUED | OUTPATIENT
Start: 2023-08-08 | End: 2023-08-11 | Stop reason: HOSPADM

## 2023-08-08 RX ORDER — GABAPENTIN 100 MG/1
200 CAPSULE ORAL AT BEDTIME
Status: DISCONTINUED | OUTPATIENT
Start: 2023-08-08 | End: 2023-08-11 | Stop reason: HOSPADM

## 2023-08-08 RX ADMIN — ATORVASTATIN CALCIUM 10 MG: 10 TABLET, FILM COATED ORAL at 07:59

## 2023-08-08 RX ADMIN — PANTOPRAZOLE SODIUM 40 MG: 40 TABLET, DELAYED RELEASE ORAL at 19:36

## 2023-08-08 RX ADMIN — GADOBUTROL 7.5 ML: 604.72 INJECTION INTRAVENOUS at 14:55

## 2023-08-08 RX ADMIN — PIPERACILLIN AND TAZOBACTAM 3.38 G: 3; .375 INJECTION, POWDER, FOR SOLUTION INTRAVENOUS at 03:47

## 2023-08-08 RX ADMIN — PANTOPRAZOLE SODIUM 40 MG: 40 TABLET, DELAYED RELEASE ORAL at 07:59

## 2023-08-08 RX ADMIN — GABAPENTIN 200 MG: 100 CAPSULE ORAL at 21:38

## 2023-08-08 RX ADMIN — CEFTRIAXONE 2 G: 2 INJECTION, POWDER, FOR SOLUTION INTRAMUSCULAR; INTRAVENOUS at 14:38

## 2023-08-08 RX ADMIN — SODIUM CHLORIDE, POTASSIUM CHLORIDE, SODIUM LACTATE AND CALCIUM CHLORIDE: 600; 310; 30; 20 INJECTION, SOLUTION INTRAVENOUS at 07:05

## 2023-08-08 RX ADMIN — HUMAN ALBUMIN MICROSPHERES AND PERFLUTREN 3 ML: 10; .22 INJECTION, SOLUTION INTRAVENOUS at 14:07

## 2023-08-08 RX ADMIN — PIPERACILLIN AND TAZOBACTAM 3.38 G: 3; .375 INJECTION, POWDER, FOR SOLUTION INTRAVENOUS at 08:04

## 2023-08-08 RX ADMIN — ACETAMINOPHEN 650 MG: 325 TABLET, FILM COATED ORAL at 07:59

## 2023-08-08 ASSESSMENT — ACTIVITIES OF DAILY LIVING (ADL)
ADLS_ACUITY_SCORE: 36
ADLS_ACUITY_SCORE: 32
ADLS_ACUITY_SCORE: 36
ADLS_ACUITY_SCORE: 36
ADLS_ACUITY_SCORE: 40
ADLS_ACUITY_SCORE: 36
ADLS_ACUITY_SCORE: 40
ADLS_ACUITY_SCORE: 32
ADLS_ACUITY_SCORE: 36
ADLS_ACUITY_SCORE: 40
ADLS_ACUITY_SCORE: 36
ADLS_ACUITY_SCORE: 36

## 2023-08-08 NOTE — PLAN OF CARE
Goal Outcome Evaluation:      Plan of Care Reviewed With: patient, family    Overall Patient Progress: improvingOverall Patient Progress: improving        ICU End of Shift Summary.  For vital signs and complete assessments, please see documentation flowsheets.      Pertinent assessments:   Neuro: Alert. Disorientated to time. Calm and cooperative with cares.   Cardiac: SR, BBB frequent PVCs/PACs  Resp: LS dim / clear. Maintaining oxygen sats on room air.   GI: WDL.    : larsen in place for retention. Adequate UOP  Skin: multiple areas of bruising to arms from IV / lab draws.   Lines: PIV  Drips: none.     Major Shift Events:   Discontinue zosyn  PT/OT ordered.   MRI completed.   Gabapentin ordered at HS  Echo completed.    IV fluids dc'd   Midline placed.   Plan (Upcoming Events): TBD. Continue IV antibiotics, supportive cares.   Discharge/Transfer Needs: TBD - pt/ot consult for safe discharge placement.      Bedside Shift Report Completed : yes  Bedside Safety Check Completed: yes      Notified provider about indwelling larsen catheter discussed removal or continued need.    Did provider choose to remove indwelling larsen catheter? NO    Provider's larsen indication for keeping indwelling larsen catheter: Indication for continued use: Retention    Is there an order for indwelling larsen catheter? YES

## 2023-08-08 NOTE — PLAN OF CARE
Goal Outcome Evaluation:      Plan of Care Reviewed With: patient    Overall Patient Progress: improvingOverall Patient Progress: improving     ICU End of Shift Summary.  For vital signs and complete assessments, please see documentation flowsheets.     Pertinent assessments: Mentation improving slightly, less restless and sitter no longer required. Afebrile. LS diminished, saturations in the high 90's on RA. SR with BBB and frequent PVC's. BP's stable. BS audible throughout. One small black/brown tarry stool. HBG trending up and no active signs of bleeding. Straight cathed twice with large amount of urine. Jackson placed at 0510 with an order in.   Major Shift Events: Jackson placed for retention.   Plan (Upcoming Events): Continue ABX, supportive cares.   Discharge/Transfer Needs: TBD    Bedside Shift Report Completed : Y  Bedside Safety Check Completed: Y

## 2023-08-08 NOTE — PROCEDURES
[facility]    Single Lumen Midline Placement    Date/Time: 8/8/2023 5:05 PM    Performed by: Marlene Mccartney RN  Authorized by: Roldan Fernandez MD  Indications: vascular access      UNIVERSAL PROTOCOL   Site Marked: Yes  Prior Images Obtained and Reviewed:  NA  Required items: Required blood products, implants, devices and special equipment available    Patient identity confirmed:  Arm band, provided demographic data, verbally with patient and hospital-assigned identification number  NA - No sedation, light sedation, or local anesthesia  Confirmation Checklist:  Patient's identity using two indicators, relevant allergies, procedure was appropriate and matched the consent or emergent situation and correct equipment/implants were available  Time out: Immediately prior to the procedure a time out was called    Universal Protocol: the Joint Commission Universal Protocol was followed    Preparation: Patient was prepped and draped in usual sterile fashion       ANESTHESIA    Anesthesia:  Local infiltration  Local Anesthetic:  Lidocaine 1% without epinephrine  Anesthetic Total (mL):  2      SEDATION    Patient Sedated: No        Preparation: skin prepped with ChloraPrep  Skin prep agent: skin prep agent completely dried prior to procedure  Sterile barriers: maximum sterile barriers were used: cap, mask, sterile gown, sterile gloves, and large sterile sheet  Hand hygiene: hand hygiene performed prior to central venous catheter insertion  Type of line used: Midline  Catheter type: single lumen  Lumen type: non-valved  Catheter size: 4 Fr  Brand: Bard  Lot number: [id number]  Placement method: venipuncture, MST and ultrasound  Number of attempts: 1  Difficulty threading catheter: no  Successful placement: yes  Orientation: left  Catheter to Vein (%): 7  Location: basilic vein  Tip Location: distal to axillary vein  Arm circumference: adults 10 cm  Extremity circumference: 25  Visible catheter length:  2  Internal length: 11 cm  Total catheter length: 13  Dressing and securement: additional securement method (see comment), alcohol impregnated caps, blood cleaned with CHG, chlorhexidine disc applied, line secured, securement device, site cleansed, sterile dressing applied, subcutaneous anchor securement system and transparent dressing  Post procedure assessment: blood return through all ports and free fluid flow  PROCEDURE Describe Procedure: LUE basilic vein midline inserted without difficulty. Tip is distal to axilla, midline OK to use. Bedside KRYSTAL Guevara updated. See flowsheet for additional details.

## 2023-08-08 NOTE — PROGRESS NOTES
[facility]    Medicine Progress Note - Hospitalist Service    Date of Admission:  8/6/2023    Assessment & Plan     Floridalma Cunningham is an 88 year old female with history of hypertension, hyperlipidemia, arthritis, and chronic kidney disease who presented to the ED on 8/6/23 with confusion and abdominal pain.ED evaluation showed tachycardia but otherwise unremarkable vital signs. Laboratory evaluation showed BUN 79.1, creatinine 0.77, bicarb 20, anion gap 17, lactic acid 3.8 (2.5 on repeat after IVF), troponin 41 (57 and 65 on repeat), WBC 20.1, hemoglobin 9.1 (7.0 on repeat), procalcitonin 0.15, and INR 1.29. Stool was guaiac positive and UA was abnormal with 11 WBC, positive nitrite, small leukocyte esterase, and few bacteria. She was admitted to the hospital with suspected GI bleed, possible UTI, and encephalopathy. She was treated with IV Protonix drip for GI bleed and Zosyn for possible UTI or other infection. She was seen by GI and had EGD which showed hiatal hernia with Mau's erosions and stigmata of recent bleeding. She required transfusion of one unit of RBCs. Mental status improved. Urine culture ultimately grew E. Coli.     Problem list:    Upper GI bleed due to hiatal hernia  Acute blood loss anemia  S/p transfusion of one unit of RBCs  S/p EGD 8/7/23  -GI consult appreciated  -Change IV Protonix drip to PO BID for 4 weeks then daily, indefinitely (per GI).  -Continue to trend hemoglobin. Hgb omid to 8.6 after transfusion but has drifted down to 7.9 this am.   -AM CBC  -I ordered conditional transfusion for HGB of 7 or less.   -AM CBC  -Of note, patient had colonoscopy on 10/30/21 for lower GI bleed that showed diverticulosis    E. Coli UTI  Sepsis (lactic acidosis, leukocytosis, and tachycardia), improved  -Change Zosyn to Rocephin  -Follow UCx and BCx.   -If BCx remains no growth, plan on 7 days of antibiotics in total. If blood cultures are positive plan on 14 days of  appropriate antibiotic.     Septic encephalopathy due UTI  Metabolic encephalopathy due to GI bleed, lactic acidosis, uremia, etc  -Supportive cares while UTI and GI bleed are being treated.     Urine retention  -Possibly due to UTI  -Jackson placed 8/7. Anticipate Jackson removal for voiding trial prior to discharge once UTI has been treated for a bit longer.     Abdominal pain, resolved  -Likely related to UTI or urine retention     Hypernatremia  -Improved, mild  -Stop IVF, encourage water  -AM BMP    Hypertension  -Continue to hold PTA lisinopril with resolving sepsis and GI bleed    Troponin elevation  -Likely due to demand ischemia from the above related issues including sepsis and GI bleeding.    -Will obtain echo to ensure no WMA (normal echo on 8/3/21)    Dyslipidemia  -Continue PTA Lipitor    CKD by chart reviewed  -Renal function seems normal aside from elevated BUN which is more likely related to GI bleed    Disposition  -Improving  -OK to transfer out of ICU   -PT and OT for discharge planning  -Possibly ready for discharge in a few days if improvement continues, hemoglobin remains stable, etc.        Diet: Regular Diet Adult    DVT Prophylaxis: Pneumatic Compression Devices  Jackson Catheter: PRESENT, indication: Retention  Lines: None     Cardiac Monitoring: ACTIVE order. Indication: Stroke, acute (48 hours)  Code Status: No CPR- Do NOT Intubate      Clinically Significant Risk Factors        # Hypokalemia: Lowest K = 3.2 mmol/L in last 2 days, will replace as needed  # Hypernatremia: Highest Na = 147 mmol/L in last 2 days, will monitor as appropriate  # Hypocalcemia: Lowest Ca = 8.3 mg/dL in last 2 days, will monitor and replace as appropriate  # Hypercalcemia: corrected calcium is >10.1, will monitor as appropriate    # Hypoalbuminemia: Lowest albumin = 3.3 g/dL at 8/7/2023  5:26 PM, will monitor as appropriate  # Coagulation Defect: INR = 1.29 (Ref range: 0.85 - 1.15) and/or PTT = 26 Seconds (Ref range:  "22 - 38 Seconds), will monitor for bleeding    # Hypertension: Noted on problem list        # Overweight: Estimated body mass index is 29.01 kg/m  as calculated from the following:    Height as of this encounter: 1.581 m (5' 2.24\").    Weight as of this encounter: 72.5 kg (159 lb 13.3 oz)., PRESENT ON ADMISSION          Disposition Plan      Expected Discharge Date: 08/10/2023                  Roldan Fernandez MD  Hospitalist Service  [facility]  Securely message with [employer] (more info)  Text page via [facility] Paging/Directory   ______________________________________________________________________    Interval History   Feeling ok today. Mental status is much better today.     Physical Exam   Vital Signs: Temp: 99  F (37.2  C) Temp src: Temporal BP: 106/85 Pulse: 85   Resp: 15 SpO2: 98 % O2 Device: None (Room air) Oxygen Delivery: 5 LPM  Weight: 159 lbs 13.34 oz    GENERAL:  Comfortable. Cooperative.  PSYCH: pleasant, oriented, No acute distress.  EYES: PERRLA, Normal conjunctiva.  HEART:  Regular rate and rhythm. No JVD. Pulses normal. No edema.  LUNGS:  Clear to auscultation, normal Respiratory effort.  ABDOMEN:  Soft, no hepatosplenomegaly, normal bowel sounds.  EXTREMETIES: No clubbing, cyanosis or ischemia  SKIN:  Dry to touch, No rash.      Medical Decision Making       60 MINUTES SPENT BY ME on the date of service doing chart review, history, exam, documentation & further activities per the note.      Data     I have personally reviewed the following data over the past 24 hrs:    12.4 (H)  \   7.9 (L)   / 160     146 (H) 116 (H) 31.5 (H) /  106 (H)   3.7 20 (L) 0.71 \     ALT: 25 AST: 35 AP: 68 TBILI: 0.4   ALB: 3.3 (L) TOT PROTEIN: 5.1 (L) LIPASE: N/A     Trop: 65 (H) BNP: N/A       Imaging results reviewed over the past 24 hrs:   No results found for this or any previous visit (from the past 24 hour(s)).  Recent Labs   Lab 08/08/23  1158 08/08/23  0603 08/08/23  0427 08/08/23  0230 " 08/08/23  0015 08/07/23  1727 08/07/23  1726 08/07/23  0555 08/07/23  0004   WBC  --  12.4*  --   --   --   --   --   --  20.1*   HGB 7.9* 8.5*  --  7.8*  --    < >  --    < > 9.1*   MCV  --  92  --   --   --   --   --   --  92   PLT  --  160  --   --   --   --   --   --  232   INR  --   --   --   --   --   --   --   --  1.29*   NA  --  146*  --   --   --   --  147*  --  140   POTASSIUM  --  3.7  --  3.7  --   --  3.2*  --  4.3   CHLORIDE  --  116*  --   --   --   --  115*  --  103   CO2  --  20*  --   --   --   --  21*  --  20*   BUN  --  31.5*  --   --   --   --  50.2*  --  79.1*   CR  --  0.71  --   --   --   --  0.68  --  0.77   ANIONGAP  --  10  --   --   --   --  11  --  17*   VLADIMIR  --  8.3*  --   --   --   --  8.5*  --  9.8   GLC  --  106* 74  --  83   < > 82   < > 108*   ALBUMIN  --   --   --   --   --   --  3.3*  --  3.4*   PROTTOTAL  --   --   --   --   --   --  5.1*  --  6.1*   BILITOTAL  --   --   --   --   --   --  0.4  --  0.5   ALKPHOS  --   --   --   --   --   --  68  --  83   ALT  --   --   --   --   --   --  25  --  24   AST  --   --   --   --   --   --  35  --  26   LIPASE  --   --   --   --   --   --   --   --  24    < > = values in this interval not displayed.

## 2023-08-09 ENCOUNTER — APPOINTMENT (OUTPATIENT)
Dept: PHYSICAL THERAPY | Facility: CLINIC | Age: 88
DRG: 871 | End: 2023-08-09
Attending: INTERNAL MEDICINE
Payer: COMMERCIAL

## 2023-08-09 LAB
ANION GAP SERPL CALCULATED.3IONS-SCNC: 8 MMOL/L (ref 7–15)
BUN SERPL-MCNC: 12.9 MG/DL (ref 8–23)
CALCIUM SERPL-MCNC: 8 MG/DL (ref 8.8–10.2)
CHLORIDE SERPL-SCNC: 111 MMOL/L (ref 98–107)
CREAT SERPL-MCNC: 0.62 MG/DL (ref 0.51–0.95)
DEPRECATED HCO3 PLAS-SCNC: 24 MMOL/L (ref 22–29)
ERYTHROCYTE [DISTWIDTH] IN BLOOD BY AUTOMATED COUNT: 16.3 % (ref 10–15)
GFR SERPL CREATININE-BSD FRML MDRD: 85 ML/MIN/1.73M2
GLUCOSE BLDC GLUCOMTR-MCNC: 107 MG/DL (ref 70–99)
GLUCOSE SERPL-MCNC: 98 MG/DL (ref 70–99)
HCT VFR BLD AUTO: 25.1 % (ref 35–47)
HGB BLD-MCNC: 7.5 G/DL (ref 11.7–15.7)
HGB BLD-MCNC: 7.8 G/DL (ref 11.7–15.7)
HGB BLD-MCNC: 9 G/DL (ref 11.7–15.7)
LACTATE SERPL-SCNC: 1.6 MMOL/L (ref 0.7–2)
MAGNESIUM SERPL-MCNC: 1.8 MG/DL (ref 1.7–2.3)
MCH RBC QN AUTO: 29.2 PG (ref 26.5–33)
MCHC RBC AUTO-ENTMCNC: 31.1 G/DL (ref 31.5–36.5)
MCV RBC AUTO: 94 FL (ref 78–100)
PLATELET # BLD AUTO: 142 10E3/UL (ref 150–450)
POTASSIUM SERPL-SCNC: 3.2 MMOL/L (ref 3.4–5.3)
POTASSIUM SERPL-SCNC: 3.4 MMOL/L (ref 3.4–5.3)
POTASSIUM SERPL-SCNC: 3.9 MMOL/L (ref 3.4–5.3)
RBC # BLD AUTO: 2.67 10E6/UL (ref 3.8–5.2)
SODIUM SERPL-SCNC: 143 MMOL/L (ref 136–145)
WBC # BLD AUTO: 8.2 10E3/UL (ref 4–11)

## 2023-08-09 PROCEDURE — 84132 ASSAY OF SERUM POTASSIUM: CPT | Performed by: STUDENT IN AN ORGANIZED HEALTH CARE EDUCATION/TRAINING PROGRAM

## 2023-08-09 PROCEDURE — 83605 ASSAY OF LACTIC ACID: CPT | Performed by: STUDENT IN AN ORGANIZED HEALTH CARE EDUCATION/TRAINING PROGRAM

## 2023-08-09 PROCEDURE — 250N000013 HC RX MED GY IP 250 OP 250 PS 637: Performed by: INTERNAL MEDICINE

## 2023-08-09 PROCEDURE — 120N000001 HC R&B MED SURG/OB

## 2023-08-09 PROCEDURE — 97161 PT EVAL LOW COMPLEX 20 MIN: CPT | Mod: GP

## 2023-08-09 PROCEDURE — 250N000011 HC RX IP 250 OP 636: Mod: JZ | Performed by: INTERNAL MEDICINE

## 2023-08-09 PROCEDURE — 250N000013 HC RX MED GY IP 250 OP 250 PS 637: Performed by: STUDENT IN AN ORGANIZED HEALTH CARE EDUCATION/TRAINING PROGRAM

## 2023-08-09 PROCEDURE — 85018 HEMOGLOBIN: CPT | Performed by: STUDENT IN AN ORGANIZED HEALTH CARE EDUCATION/TRAINING PROGRAM

## 2023-08-09 PROCEDURE — 97530 THERAPEUTIC ACTIVITIES: CPT | Mod: GP

## 2023-08-09 PROCEDURE — 85027 COMPLETE CBC AUTOMATED: CPT | Performed by: STUDENT IN AN ORGANIZED HEALTH CARE EDUCATION/TRAINING PROGRAM

## 2023-08-09 PROCEDURE — 99233 SBSQ HOSP IP/OBS HIGH 50: CPT | Performed by: STUDENT IN AN ORGANIZED HEALTH CARE EDUCATION/TRAINING PROGRAM

## 2023-08-09 PROCEDURE — 83735 ASSAY OF MAGNESIUM: CPT | Performed by: STUDENT IN AN ORGANIZED HEALTH CARE EDUCATION/TRAINING PROGRAM

## 2023-08-09 PROCEDURE — 80048 BASIC METABOLIC PNL TOTAL CA: CPT | Performed by: STUDENT IN AN ORGANIZED HEALTH CARE EDUCATION/TRAINING PROGRAM

## 2023-08-09 PROCEDURE — 36415 COLL VENOUS BLD VENIPUNCTURE: CPT | Performed by: STUDENT IN AN ORGANIZED HEALTH CARE EDUCATION/TRAINING PROGRAM

## 2023-08-09 RX ORDER — POTASSIUM CHLORIDE 1500 MG/1
40 TABLET, EXTENDED RELEASE ORAL ONCE
Status: COMPLETED | OUTPATIENT
Start: 2023-08-09 | End: 2023-08-09

## 2023-08-09 RX ADMIN — GABAPENTIN 200 MG: 100 CAPSULE ORAL at 22:39

## 2023-08-09 RX ADMIN — PANTOPRAZOLE SODIUM 40 MG: 40 TABLET, DELAYED RELEASE ORAL at 08:52

## 2023-08-09 RX ADMIN — PANTOPRAZOLE SODIUM 40 MG: 40 TABLET, DELAYED RELEASE ORAL at 20:55

## 2023-08-09 RX ADMIN — POTASSIUM CHLORIDE 40 MEQ: 1500 TABLET, EXTENDED RELEASE ORAL at 08:52

## 2023-08-09 RX ADMIN — ATORVASTATIN CALCIUM 10 MG: 10 TABLET, FILM COATED ORAL at 08:52

## 2023-08-09 RX ADMIN — CEFTRIAXONE 2 G: 2 INJECTION, POWDER, FOR SOLUTION INTRAMUSCULAR; INTRAVENOUS at 15:28

## 2023-08-09 RX ADMIN — POTASSIUM CHLORIDE 40 MEQ: 1500 TABLET, EXTENDED RELEASE ORAL at 16:20

## 2023-08-09 ASSESSMENT — ACTIVITIES OF DAILY LIVING (ADL)
ADLS_ACUITY_SCORE: 32
ADLS_ACUITY_SCORE: 28
DEPENDENT_IADLS:: TRANSPORTATION;SHOPPING
ADLS_ACUITY_SCORE: 32

## 2023-08-09 NOTE — PLAN OF CARE
Goal Outcome Evaluation:      Plan of Care Reviewed With: patient    Overall Patient Progress: improvingOverall Patient Progress: improving         ICU End of Shift Summary.  For vital signs and complete assessments, please see documentation flowsheets.     Pertinent assessments:   Neuro: Mentation continuing to improve. Disoriented to time only.   CV: Frequent PVC's and PAC's-asymptomatic. BP's stable.   Resp: LS clear and diminished. Saturations in the high 90's on RA.   GI/: BS audible throughout. 1 BM this shift, black/brown/green. No active signs of bleeding. Jackson remains in place with good UO.   Skin: Scattered bruising on upper extremities. Reddened perineum and buttocks, barrier cream applied.   Lines: L ML in place. Flushes appropriately, failing to draw blood.   ID: afebrile. Rocephin for UTI.   Labs: K 3.2, orders for supplementation.   Major Shift Events: ML fails to draw blood, now lab draw.   Plan (Upcoming Events): Continue ABX for UTI. Supportive cares.   Discharge/Transfer Needs: Transferring to 5th floor this am. Report given to Wen. Patient aware of transfer and no questions. All belongings sent with patient.     Bedside Shift Report Completed : Y  Bedside Safety Check Completed: Y

## 2023-08-09 NOTE — PROGRESS NOTES
Care Management Follow Up    Length of Stay (days): 2    Expected Discharge Date: 08/10/2023     Concerns to be Addressed: discharge planning     Patient plan of care discussed at interdisciplinary rounds: Yes    Anticipated Discharge Disposition: Skilled Nursing Facility, Transitional Care     Anticipated Discharge Services: None  Anticipated Discharge DME: Tom    Patient/family educated on Medicare website which has current facility and service quality ratings: yes  Education Provided on the Discharge Plan: Yes  Patient/Family in Agreement with the Plan: yes    Referrals Placed by CM/SW: Post Acute Facilities  Private pay costs discussed: Not applicable    Additional Information:  Completed PAS. [id number].     SW appreciates orders completed 2 hours prior to discharge on day of discharge.     SAHIL Velarde, LGSW  Emergency Room   Please contact the SW on the floor in which the patient is staying for any questions or concerns

## 2023-08-09 NOTE — PLAN OF CARE
A/O, forgetful., BP elevated, otherwise vitally stable on RA. Assist of 1 with G/W. Renetta removed @1645, due to void. Denies pain, SOB and N/V. Pt had 5 beats of V tach this AM, MD made aware. Tele dc'd. K replaced. Midline in place, saline locked.   Major Shift Events none  Treatment Plan: Rocephin, protonix, TCU on Friday

## 2023-08-09 NOTE — PROGRESS NOTES
Medicine Progress Note - Hospitalist Service    Date of Admission:  8/6/2023    Assessment & Plan     Floridalma Cunningham is an 88 year old female with history of hypertension, hyperlipidemia, arthritis, and chronic kidney disease who presented to the ED on 8/6/23 with confusion and abdominal pain.ED evaluation showed tachycardia but otherwise unremarkable vital signs. Laboratory evaluation showed BUN 79.1, creatinine 0.77, bicarb 20, anion gap 17, lactic acid 3.8 (2.5 on repeat after IVF), troponin 41 (57 and 65 on repeat), WBC 20.1, hemoglobin 9.1 (7.0 on repeat), procalcitonin 0.15, and INR 1.29. Stool was guaiac positive and UA was abnormal with 11 WBC, positive nitrite, small leukocyte esterase, and few bacteria. She was admitted to the hospital with suspected GI bleed, possible UTI, and encephalopathy. She was treated with IV Protonix drip for GI bleed and Zosyn for possible UTI or other infection. She was seen by GI and had EGD which showed hiatal hernia with Mau's erosions and stigmata of recent bleeding. She required transfusion of one unit of RBCs. Mental status improved. Urine culture ultimately grew E. Coli.     Problem list:    Upper GI bleed due to hiatal hernia  Acute blood loss anemia  S/p transfusion of one unit of RBCs  S/p EGD 8/7/23  -GI consult appreciated  -Change IV Protonix drip to PO BID for 4 weeks then daily, indefinitely (per GI).  -Continue to trend hemoglobin. Hgb omid to 8.6 after transfusion but has drifted down to 7.9 this am.   -AM CBC  -I ordered conditional transfusion for HGB of 7 or less.   -AM CBC  -Of note, patient had colonoscopy on 10/30/21 for lower GI bleed that showed diverticulosis    E. Coli UTI  Sepsis (lactic acidosis, leukocytosis, and tachycardia), improved  -Change Zosyn to Rocephin  -Follow UCx and BCx.   -If BCx remains no growth, plan on 7 days of antibiotics in total. If blood cultures are positive plan on 14 days of  appropriate antibiotic.     Septic encephalopathy due UTI  Metabolic encephalopathy due to GI bleed, lactic acidosis, uremia, etc  -Supportive cares while UTI and GI bleed are being treated.   -MRI of the brain on 8/8 did not show any acute intracranial process, no findings to suggest recent infarct but had chronic small vessel ischemic changes.  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    Urine retention  -Possibly due to UTI  -Jackson placed 8/7.  Remove Jackson today.  r.     Abdominal pain, resolved  -Likely related to UTI or urine retention     Hypernatremia  -Improved, mild  -Stop IVF, encourage water  -AM BMP    Hypertension  -Continue to hold PTA lisinopril with resolving sepsis and GI bleed    Troponin elevation  -Likely due to demand ischemia from the above related issues including sepsis and GI bleeding.    -Will obtain echo to ensure no WMA (normal echo on 8/3/21)    Dyslipidemia  -Continue PTA Lipitor    CKD by chart reviewed  -Renal function seems normal aside from elevated BUN which is more likely related to GI bleed    Disposition  -Improving  -TCU recommended by PT.       Diet: Regular Diet Adult    DVT Prophylaxis: Pneumatic Compression Devices  Jackson Catheter: PRESENT, indication: Retention  Lines: PRESENT             Cardiac Monitoring: ACTIVE order. Indication: Stroke, acute (48 hours)  Code Status: No CPR- Do NOT Intubate      Clinically Significant Risk Factors        # Hypokalemia: Lowest K = 3.2 mmol/L in last 2 days, will replace as needed  # Hypernatremia: Highest Na = 147 mmol/L in last 2 days, will monitor as appropriate  # Hypocalcemia: Lowest Ca = 8 mg/dL in last 2 days, will monitor and replace as appropriate     # Hypoalbuminemia: Lowest albumin = 3.3 g/dL at 8/7/2023  5:26 PM, will monitor as appropriate    # Coagulation Defect: INR = 1.29 (Ref range:  "0.85 - 1.15) and/or PTT = 26 Seconds (Ref range: 22 - 38 Seconds), will monitor for bleeding      # Hypertension: Noted on problem list        # Overweight: Estimated body mass index is 28.61 kg/m  as calculated from the following:    Height as of this encounter: 1.581 m (5' 2.24\").    Weight as of this encounter: 71.5 kg (157 lb 10.1 oz).  , PRESENT ON ADMISSION          Disposition Plan      Expected Discharge Date: 08/10/2023      Destination: inpatient rehabilitation facility            Lauri Moscoso MD  Hospitalist Service  [facility]  Securely message with [employer] (more info)  Text page via [facility] Paging/Directory   ______________________________________________________________________    Interval History   Feeling ok today. Mental status is much better today.     Physical Exam   Vital Signs: Temp: 98.5  F (36.9  C) Temp src: Oral BP: (!) 156/74 Pulse: 64   Resp: 16 SpO2: 97 % O2 Device: None (Room air)    Weight: 157 lbs 10.06 oz    GENERAL:  Comfortable. Cooperative.  PSYCH: pleasant, oriented, No acute distress.  EYES: PERRLA, Normal conjunctiva.  HEART:  Regular rate and rhythm. No JVD. Pulses normal. No edema.  LUNGS:  Clear to auscultation, normal Respiratory effort.  ABDOMEN:  Soft, no hepatosplenomegaly, normal bowel sounds.  EXTREMETIES: No clubbing, cyanosis or ischemia  SKIN:  Dry to touch, No rash.      Medical Decision Making       60 MINUTES SPENT BY ME on the date of service doing chart review, history, exam, documentation & further activities per the note.      Data     I have personally reviewed the following data over the past 24 hrs:    8.2  \   9.0 (L)   / 142 (L)     143 111 (H) 12.9 /  98   3.4 24 0.62 \       Imaging results reviewed over the past 24 hrs:   No results found for this or any previous visit (from the past 24 hour(s)).  Recent Labs   Lab 08/09/23  1242 08/09/23  0559 08/09/23  0158 08/08/23  2357 08/08/23  1714 08/08/23  1158 08/08/23  0603 08/07/23  1727 " 08/07/23  1726 08/07/23  0555 08/07/23  0004   WBC  --  8.2  --   --   --   --  12.4*  --   --   --  20.1*   HGB 9.0* 7.8*  --  7.5* 7.8*   < > 8.5*   < >  --    < > 9.1*   MCV  --  94  --   --   --   --  92  --   --   --  92   PLT  --  142*  --   --   --   --  160  --   --   --  232   INR  --   --   --   --   --   --   --   --   --   --  1.29*   NA  --  143  --   --  143  --  146*  --  147*  --  140   POTASSIUM 3.4 3.2*  --   --   --   --  3.7   < > 3.2*  --  4.3   CHLORIDE  --  111*  --   --   --   --  116*  --  115*  --  103   CO2  --  24  --   --   --   --  20*  --  21*  --  20*   BUN  --  12.9  --   --   --   --  31.5*  --  50.2*  --  79.1*   CR  --  0.62  --   --   --   --  0.71  --  0.68  --  0.77   ANIONGAP  --  8  --   --   --   --  10  --  11  --  17*   VLADIMIR  --  8.0*  --   --   --   --  8.3*  --  8.5*  --  9.8   GLC  --  98 107*  --   --   --  106*   < > 82   < > 108*   ALBUMIN  --   --   --   --   --   --   --   --  3.3*  --  3.4*   PROTTOTAL  --   --   --   --   --   --   --   --  5.1*  --  6.1*   BILITOTAL  --   --   --   --   --   --   --   --  0.4  --  0.5   ALKPHOS  --   --   --   --   --   --   --   --  68  --  83   ALT  --   --   --   --   --   --   --   --  25  --  24   AST  --   --   --   --   --   --   --   --  35  --  26   LIPASE  --   --   --   --   --   --   --   --   --   --  24    < > = values in this interval not displayed.

## 2023-08-09 NOTE — PROGRESS NOTES
08/09/23 1000   Appointment Info   Signing Clinician's Name / Credentials (PT) Olivia Garcia DPT   Living Environment   People in Home alone   Current Living Arrangements independent living facility   Home Accessibility no concerns   Transportation Anticipated family or friend will provide   Self-Care   Usual Activity Tolerance moderate   Current Activity Tolerance poor   Regular Exercise Yes   Activity/Exercise Type   (NuStep)   Exercise Amount/Frequency 2 times/wk   Equipment Currently Used at Home walker, rolling;raised toilet seat;shower chair;grab bar, toilet;grab bar, tub/shower   Fall history within last six months no   Activity/Exercise/Self-Care Comment Pt IND w/ mobility, uses 4WW.   General Information   Onset of Illness/Injury or Date of Surgery 08/06/23   Referring Physician Roldan Fernandez MD   Patient/Family Therapy Goals Statement (PT) Return home   Pertinent History of Current Problem (include personal factors and/or comorbidities that impact the POC) Per chart: Floridalma Cunningham is an 88 year old female with history of hypertension, hyperlipidemia, arthritis, and chronic kidney disease who presented to the ED on 8/6/23 with confusion and abdominal pain   Existing Precautions/Restrictions fall   General Observations Pt supine in bed upon therapist arrival, agreeable to PT   Cognition   Affect/Mental Status (Cognition) WFL   Orientation Status (Cognition) oriented x 4   Follows Commands (Cognition) WFL   Pain Assessment   Patient Currently in Pain   (Pt reported minimal pain in B knees)   Integumentary/Edema   Integumentary/Edema no deficits were identifed   Posture    Posture Forward head position;Protracted shoulders   Range of Motion (ROM)   Range of Motion ROM deficits secondary to weakness   Strength (Manual Muscle Testing)   Strength (Manual Muscle Testing) Deficits observed during functional mobility   Bed Mobility   Comment, (Bed Mobility) Supine to sit CGA   Transfers   Comment,  (Transfers) Sit to stand Min A   Gait/Stairs (Locomotion)   Comment, (Gait/Stairs) Pt amb w/ FWW and Min A   Balance   Balance Comments Fair seated and standing   Sensory Examination   Sensory Perception patient reports no sensory changes   Sensory Perception Comments Baseline neuropathy   Clinical Impression   Criteria for Skilled Therapeutic Intervention Yes, treatment indicated   PT Diagnosis (PT) Impaired functional mobility and gait   Influenced by the following impairments Pain, weakness, decreased activity tolerance, impaired balance   Functional limitations due to impairments Limited functional mobility requiring AD and assist   Clinical Presentation (PT Evaluation Complexity) Stable/Uncomplicated   Clinical Presentation Rationale Based on PMH, current status, and social support   Clinical Decision Making (Complexity) low complexity   Planned Therapy Interventions (PT) balance training;gait training;strengthening;transfer training;risk factor education;progressive activity/exercise   Anticipated Equipment Needs at Discharge (PT) walker, standard   Risk & Benefits of therapy have been explained evaluation/treatment results reviewed;care plan/treatment goals reviewed;risks/benefits reviewed;current/potential barriers reviewed;participants voiced agreement with care plan;participants included;patient   PT Total Evaluation Time   PT Eval, Low Complexity Minutes (70505) 10   Physical Therapy Goals   PT Frequency 5x/week   PT Predicted Duration/Target Date for Goal Attainment 08/16/23   PT Goals Bed Mobility;Transfers;Gait   PT: Bed Mobility Modified independent;Supine to/from sit   PT: Transfers Modified independent;Sit to/from stand;Assistive device   PT: Gait Supervision/stand-by assist;Assistive device;100 feet   Interventions   Interventions Quick Adds Therapeutic Activity   Therapeutic Activity   Therapeutic Activities: dynamic activities to improve functional performance Minutes (42145) 20   Symptoms Noted  During/After Treatment Fatigue   Treatment Detail/Skilled Intervention Pt reported concerns throughout session w/ her current mobility, reported feeling very weak. Therapist providing therapeutic listening and encouraging. Pt performed sit <> stand x2 reps w/ FWW and Min A. Pt demos forward flexed posture. VCs to bring hips forward and shoulders back, pt able to correct w/ tactile cues. Pt performed standing weight shifting and mini marches w/ FWW and Min A. Seated rest break. Time setting up recliner. Pt amb 5' to recliner w/ FWW and Min A. VCs to control descent into recliner. Pt able to shift hips back w/ cues. Pt in recliner at end of session, LE elevated, chair alarm on, all needs w/in reach, pillow support.   PT Discharge Planning   PT Plan Progress bed mob, transfers, amb w/ WC follow   PT Discharge Recommendation (DC Rec) Transitional Care Facility   PT Rationale for DC Rec Pt below baseline. Pt currently Min-Mod A for transfers and short distance ambulation. Recommend TCU to increase strength and functional mobility.   PT Brief overview of current status Min-Mod A   Total Session Time   Timed Code Treatment Minutes 20   Total Session Time (sum of timed and untimed services) 30

## 2023-08-09 NOTE — CONSULTS
Care Management Initial Consult    General Information  Assessment completed with: Patient,    Type of CM/SW Visit: Initial Assessment    Primary Care Provider verified and updated as needed: Yes   Readmission within the last 30 days:        Reason for Consult: discharge planning  Advance Care Planning: Advance Care Planning Reviewed: present on chart          Communication Assessment  Patient's communication style: spoken language (English or Bilingual)    Hearing Difficulty or Deaf: no   Wear Glasses or Blind: yes    Cognitive  Cognitive/Neuro/Behavioral: WDL  Level of Consciousness: alert  Arousal Level: opens eyes spontaneously  Orientation: oriented x 4  Mood/Behavior: calm  Best Language: 0 - No aphasia  Speech: spontaneous, logical, clear    Living Environment:   People in home: alone     Current living Arrangements: independent living facility      Able to return to prior arrangements: yes (After TCU)       Family/Social Support:  Care provided by: self  Provides care for: no one  Marital Status:   Children, Facility resident(s)/Staff          Description of Support System: Involved, Supportive    Support Assessment: Adequate family and caregiver support, Adequate social supports    Current Resources:   Patient receiving home care services: No     Community Resources: None  Equipment currently used at home: walker, rolling, raised toilet seat, shower chair, grab bar, toilet, grab bar, tub/shower  Supplies currently used at home: None    Employment/Financial:  Employment Status: retired        Financial Concerns: No concerns identified   Referral to Financial Worker: No       Does the patient's insurance plan have a 3 day qualifying hospital stay waiver?  Yes   Will the waiver be used for post-acute placement? No    Lifestyle & Psychosocial Needs:  Social Determinants of Health     Tobacco Use: Medium Risk (8/8/2023)    Patient History     Smoking Tobacco Use: Former     Smokeless Tobacco Use: Never      Passive Exposure: Not on file   Alcohol Use: Unknown (4/4/2019)    AUDIT-C     Frequency of Alcohol Consumption: Monthly or less     Average Number of Drinks: Not on file     Frequency of Binge Drinking: Not on file   Financial Resource Strain: Not on file   Food Insecurity: Not on file   Transportation Needs: Not on file   Physical Activity: Not on file   Stress: Not on file   Social Connections: Not on file   Intimate Partner Violence: Not on file   Depression: Not at risk (4/12/2022)    PHQ-2     PHQ-2 Score: 2   Recent Concern: Depression - At risk (3/9/2022)    PHQ-2     PHQ-2 Score: 3   Housing Stability: Not on file       Functional Status:  Prior to admission patient needed assistance:   Dependent ADLs:: Independent, Ambulation-walker  Dependent IADLs:: Transportation, Shopping  Assesssment of Functional Status: Not at baseline with ADL Functioning, Not at baseline with mobility    Mental Health Status:  Mental Health Status: No Current Concerns       Chemical Dependency Status:  Chemical Dependency Status: No Current Concerns             Values/Beliefs:  Spiritual, Cultural Beliefs, Holiness Practices, Values that affect care:            Values/Beliefs Comment: Nica    Additional Information:  Sw met with the pt to discuss her discharge plan.  Pt was resting in bed when Sw arrived.  The pt was in a pleasant mood and contributed appropriately to the conversation.    Pt states that she is mostly independent at baseline, but she no longer drives and her dtrs to the grocery shopping for her.  If she needs to leave her apartment, her dtrs provide transportation.  Pt uses a walker at baseline.  She is aware of the recommendation for TCU and is agreeable.  She would like a referral sent to [facility].  Pt said that she is surprised on how much she has deconditioned after only being in the hospital a couple of days.  She is unsure about transportation at discharge.  She would like to see if she gets any  stronger and then determine if her dtr can transport or if she will need w/c transport.    Abrahan sent the referral to [facility].    Abrahan will continue with discharge planning and will be available as needed until discharge.      SAHIL Anderson, [facility]  Inpatient Care Coordination  [employer] [employer]  [phone]    ADDENDUM 1348:  Abrahan received a call from Sara at [facility], [phone], updating Sw that they are able to admit the pt on Friday.

## 2023-08-10 ENCOUNTER — APPOINTMENT (OUTPATIENT)
Dept: OCCUPATIONAL THERAPY | Facility: CLINIC | Age: 88
DRG: 871 | End: 2023-08-10
Attending: INTERNAL MEDICINE
Payer: COMMERCIAL

## 2023-08-10 ENCOUNTER — APPOINTMENT (OUTPATIENT)
Dept: PHYSICAL THERAPY | Facility: CLINIC | Age: 88
DRG: 871 | End: 2023-08-10
Payer: COMMERCIAL

## 2023-08-10 LAB
ANION GAP SERPL CALCULATED.3IONS-SCNC: 8 MMOL/L (ref 7–15)
BASOPHILS # BLD AUTO: 0 10E3/UL (ref 0–0.2)
BASOPHILS NFR BLD AUTO: 0 %
BUN SERPL-MCNC: 8.5 MG/DL (ref 8–23)
CALCIUM SERPL-MCNC: 8.5 MG/DL (ref 8.8–10.2)
CHLORIDE SERPL-SCNC: 109 MMOL/L (ref 98–107)
CREAT SERPL-MCNC: 0.58 MG/DL (ref 0.51–0.95)
DEPRECATED HCO3 PLAS-SCNC: 24 MMOL/L (ref 22–29)
EOSINOPHIL # BLD AUTO: 0.3 10E3/UL (ref 0–0.7)
EOSINOPHIL NFR BLD AUTO: 4 %
ERYTHROCYTE [DISTWIDTH] IN BLOOD BY AUTOMATED COUNT: 16.4 % (ref 10–15)
GFR SERPL CREATININE-BSD FRML MDRD: 87 ML/MIN/1.73M2
GLUCOSE SERPL-MCNC: 111 MG/DL (ref 70–99)
HCT VFR BLD AUTO: 26 % (ref 35–47)
HGB BLD-MCNC: 8.2 G/DL (ref 11.7–15.7)
IMM GRANULOCYTES # BLD: 0.1 10E3/UL
IMM GRANULOCYTES NFR BLD: 1 %
LYMPHOCYTES # BLD AUTO: 2 10E3/UL (ref 0.8–5.3)
LYMPHOCYTES NFR BLD AUTO: 21 %
MAGNESIUM SERPL-MCNC: 1.8 MG/DL (ref 1.7–2.3)
MCH RBC QN AUTO: 30 PG (ref 26.5–33)
MCHC RBC AUTO-ENTMCNC: 31.5 G/DL (ref 31.5–36.5)
MCV RBC AUTO: 95 FL (ref 78–100)
MONOCYTES # BLD AUTO: 0.6 10E3/UL (ref 0–1.3)
MONOCYTES NFR BLD AUTO: 7 %
NEUTROPHILS # BLD AUTO: 6.3 10E3/UL (ref 1.6–8.3)
NEUTROPHILS NFR BLD AUTO: 67 %
NRBC # BLD AUTO: 0 10E3/UL
NRBC BLD AUTO-RTO: 0 /100
PLATELET # BLD AUTO: 144 10E3/UL (ref 150–450)
POTASSIUM SERPL-SCNC: 3.9 MMOL/L (ref 3.4–5.3)
RBC # BLD AUTO: 2.73 10E6/UL (ref 3.8–5.2)
SODIUM SERPL-SCNC: 141 MMOL/L (ref 136–145)
WBC # BLD AUTO: 9.3 10E3/UL (ref 4–11)

## 2023-08-10 PROCEDURE — 97165 OT EVAL LOW COMPLEX 30 MIN: CPT | Mod: GO

## 2023-08-10 PROCEDURE — 120N000001 HC R&B MED SURG/OB

## 2023-08-10 PROCEDURE — 250N000011 HC RX IP 250 OP 636: Mod: JZ | Performed by: INTERNAL MEDICINE

## 2023-08-10 PROCEDURE — 85025 COMPLETE CBC W/AUTO DIFF WBC: CPT | Performed by: STUDENT IN AN ORGANIZED HEALTH CARE EDUCATION/TRAINING PROGRAM

## 2023-08-10 PROCEDURE — 97535 SELF CARE MNGMENT TRAINING: CPT | Mod: GO

## 2023-08-10 PROCEDURE — 99232 SBSQ HOSP IP/OBS MODERATE 35: CPT | Performed by: STUDENT IN AN ORGANIZED HEALTH CARE EDUCATION/TRAINING PROGRAM

## 2023-08-10 PROCEDURE — 36415 COLL VENOUS BLD VENIPUNCTURE: CPT | Performed by: STUDENT IN AN ORGANIZED HEALTH CARE EDUCATION/TRAINING PROGRAM

## 2023-08-10 PROCEDURE — 83735 ASSAY OF MAGNESIUM: CPT | Performed by: STUDENT IN AN ORGANIZED HEALTH CARE EDUCATION/TRAINING PROGRAM

## 2023-08-10 PROCEDURE — 250N000013 HC RX MED GY IP 250 OP 250 PS 637: Performed by: INTERNAL MEDICINE

## 2023-08-10 PROCEDURE — 82310 ASSAY OF CALCIUM: CPT | Performed by: STUDENT IN AN ORGANIZED HEALTH CARE EDUCATION/TRAINING PROGRAM

## 2023-08-10 PROCEDURE — 97116 GAIT TRAINING THERAPY: CPT | Mod: GP

## 2023-08-10 PROCEDURE — 97530 THERAPEUTIC ACTIVITIES: CPT | Mod: GP

## 2023-08-10 RX ADMIN — PANTOPRAZOLE SODIUM 40 MG: 40 TABLET, DELAYED RELEASE ORAL at 20:12

## 2023-08-10 RX ADMIN — GABAPENTIN 200 MG: 100 CAPSULE ORAL at 22:08

## 2023-08-10 RX ADMIN — PANTOPRAZOLE SODIUM 40 MG: 40 TABLET, DELAYED RELEASE ORAL at 07:57

## 2023-08-10 RX ADMIN — CEFTRIAXONE 2 G: 2 INJECTION, POWDER, FOR SOLUTION INTRAMUSCULAR; INTRAVENOUS at 16:22

## 2023-08-10 RX ADMIN — ATORVASTATIN CALCIUM 10 MG: 10 TABLET, FILM COATED ORAL at 07:57

## 2023-08-10 ASSESSMENT — ACTIVITIES OF DAILY LIVING (ADL)
ADLS_ACUITY_SCORE: 36
PREVIOUS_RESPONSIBILITIES: FINANCES;MEDICATION MANAGEMENT
ADLS_ACUITY_SCORE: 36

## 2023-08-10 NOTE — PROGRESS NOTES
End of Shift Summary  For vital signs and complete assessments, please see documentation flowsheets.     Pertinent assessments: Pt A&O, forgetful. HR tachy, elevated BP, on RA. C/O bilateral knee pain, declined pain medication. BS active, small black BM x 1 this shift. Up to void, incont of urine x 2, bladder scanned for 556 after last void of 230, straight cath for 500 this morning      Major Shift Events: sepsis triggered Lactic 1.6    Treatment Plan: IV Rocephin, continue to monitor urine output, K+/Mg protocol, monitor Hgb, possible discharge to TCU Friday

## 2023-08-10 NOTE — PROGRESS NOTES
08/10/23 0815   Appointment Info   Signing Clinician's Name / Credentials (OT) LOIDA Flanagan   Student Supervision Line of sight supervision provided   Living Environment   People in Home alone   Current Living Arrangements independent living facility   Home Accessibility no concerns   Transportation Anticipated family or friend will provide;agency   Living Environment Comments Pt lives alone in an ILF. Pt does receive services fr IADLs from ILF> Pt reports having a walk in shower, grab bars and shower chair. RTS with frame.   Self-Care   Usual Activity Tolerance moderate   Current Activity Tolerance poor   Regular Exercise Yes   Activity/Exercise Type   (Nustep x2 and chair exercise x2, stretch friday)   Exercise Amount/Frequency daily   Equipment Currently Used at Home walker, rolling;raised toilet seat;shower chair;grab bar, toilet;grab bar, tub/shower   Fall history within last six months no   Activity/Exercise/Self-Care Comment Pt reports being indep with all ADLs and mobility with 4WW at baseline. Pt has a walk in shower with grab bars and shower chair. She also has a RTS with a frame.   Instrumental Activities of Daily Living (IADL)   Previous Responsibilities finances;medication management   IADL Comments Pt eats meals in facility. Reports dtrs assist with laundry. cleaning, shopping and driving   General Information   Onset of Illness/Injury or Date of Surgery 08/06/23   Referring Physician Roldan Fernandez MD   Patient/Family Therapy Goal Statement (OT) to get stronger and go home   Additional Occupational Profile Info/Pertinent History of Current Problem per chart, 'Floridalma Cunningham is an 88 year old female with history of hypertension, hyperlipidemia, arthritis, and chronic kidney disease who presented to the ED on 8/6/23 with confusion and abdominal pain.ED evaluation showed tachycardia but otherwise unremarkable vital signs. Laboratory evaluation showed BUN 79.1, creatinine 0.77, bicarb 20, anion  "gap 17, lactic acid 3.8 (2.5 on repeat after IVF), troponin 41 (57 and 65 on repeat), WBC 20.1, hemoglobin 9.1 (7.0 on repeat), procalcitonin 0.15, and INR 1.29. Stool was guaiac positive and UA was abnormal with 11 WBC, positive nitrite, small leukocyte esterase, and few bacteria. She was admitted to the hospital with suspected GI bleed, possible UTI, and encephalopathy. She was treated with IV Protonix drip for GI bleed and Zosyn for possible UTI or other infection. She was seen by GI and had EGD which showed hiatal hernia with Mau's erosions and stigmata of recent bleeding. She required transfusion of one unit of RBCs. Mental status improved. Urine culture ultimately grew E. Coli.\"   Existing Precautions/Restrictions fall   General Observations and Info Pt sitting on bedsdie chair upon arrival; agreeable to session   Cognitive Status Examination   Orientation Status orientation to person, place and time   Visual Perception   Visual Impairment/Limitations corrective lenses for reading   Sensory   Sensory Comments neuropathy in BLEs   Pain Assessment   Patient Currently in Pain No   Range of Motion Comprehensive   Comment, General Range of Motion limited ROM BUEs - can only extend to 90. Pt reports shoulders have always been bad - no previous surgeries   Strength Comprehensive (MMT)   Comment, General Manual Muscle Testing (MMT) Assessment overall general weakness   Coordination   Upper Extremity Coordination No deficits were identified   Bed Mobility   Bed Mobility supine-sit   Supine-Sit Elizabeth (Bed Mobility) not tested   Comment (Bed Mobility) Pt sitting on bedside chair upon arrival and returned to chair at end of session   Transfers   Transfers sit-stand transfer;toilet transfer;shower transfer   Sit-Stand Transfer   Sit-Stand Elizabeth (Transfers) moderate assist (50% patient effort)   Assistive Device (Sit-Stand Transfers) walker, front-wheeled   Shower Transfer   Type (Shower Transfer) " sit-stand;stand-sit;lateral   Woodward Level (Shower Transfer) moderate assist (50% patient effort)   Assistive Device (Shower Transfer) grab bar, tub rail;walker, front-wheeled   Toilet Transfer   Type (Toilet Transfer) sit-stand;stand-sit   [place] Level (Toilet Transfer) moderate assist (50% patient effort)   Assistive Device (Toilet Transfer) walker, front-wheeled   Balance   Balance Comments CGA and use of FWW   Activities of Daily Living   BADL Assessment/Intervention lower body dressing;grooming;toileting;bathing   Bathing Assessment/Intervention   [place] Level (Bathing) maximum assist (25% patient effort)   Lower Body Dressing Assessment/Training   [place] Level (Lower Body Dressing) moderate assist (50% patient effort)   Grooming Assessment/Training   [place] Level (Grooming) minimum assist (75% patient effort)   Toileting   Wood[place] Level (Toileting) moderate assist (50% patient effort)   Clinical Impression   Criteria for Skilled Therapeutic Interventions Met (OT) Yes, treatment indicated   OT Diagnosis impaiored ADLs, IADLs and mobility   OT Problem List-Impairments impacting ADL problems related to;activity tolerance impaired;range of motion (ROM);strength   Assessment of Occupational Performance 5 or more Performance Deficits   Identified Performance Deficits dsg, toileting, bathing, mobility, tfs   Planned Therapy Interventions (OT) ADL retraining;IADL retraining;ROM;transfer training;strengthening;progressive activity/exercise;risk factor education   Clinical Decision Making Complexity (OT) low complexity   Risk & Benefits of therapy have been explained evaluation/treatment results reviewed;care plan/treatment goals reviewed;risks/benefits reviewed;current/potential barriers reviewed;participants voiced agreement with care plan;participants included;patient   OT Total Evaluation Time   OT Eval, Low Complexity Minutes (17496) 10   OT Goals   Therapy Frequency (OT) 5  times/wk   OT Predicted Duration/Target Date for Goal Attainment 08/17/23   OT Goals Lower Body Dressing;Lower Body Bathing;Toilet Transfer/Toileting;OT Goal 1;Hygiene/Grooming   OT: Hygiene/Grooming modified independent;while standing   OT: Lower Body Dressing Modified independent;using adaptive equipment   OT: Lower Body Bathing Modified independent;using adaptive equipment   OT: Toilet Transfer/Toileting Modified independent;toilet transfer;cleaning and garment management;using adaptive equipment   OT: Goal 1 Pt will be able to withstand > 8 minutes of standing activity w/o restbreak to demosntrate activity tolerance in d/c environment   Interventions   Interventions Quick Adds Self-Care/Home Management   Self-Care/Home Management   Self-Care/Home Mgmt/ADL, Compensatory, Meal Prep Minutes (55776) 25   Symptoms Noted During/After Treatment (Meal Preparation/Planning Training) shortness of breath;fatigue   Treatment Detail/Skilled Intervention OT: Pt sitting on bedside chair upon arrival; agreeable to session. Pt cued to doff/don socks in simulation of home environment. Pt doffed socks with Rommel. Pt educated on LB dsg technique of figure 4. Pt able to complete figure 4 but d/t limited shoulder ROM, Pt unable to don socks. Pt ultimately dep for donning socks. Pt sit > stand from bedside chair with modA x 1 and use of FWW. Pt engaged in functional mobility to BR with CGA and use of FWW. Pt demonstrated ability to tfs on/off toilet with modA and use of grab bars/FWW. Pt participated in sinkside cares for > 5 minutes with SBA and no rest break. Pt demonstrated ability to complete teeth brushing routine and hair routine with SBA. Pt visibly fatigued and SOB observed after sinkside g/h; Pt declined rest break or symptoms of feeling SOB. Pt particiapted in shower tfs in simulation of home environment with CGA and use of FWW/grab bars. Pt stand <> sit on shower chair for rest break with modA. Increase time for rest break.  Pt returned to bedside chair and stand > sit with CGA and use of FWW. Pt visibly SOB; vitals obtained; SpO2 97%. Pt reporting fatigue and remains on bedside chair at end of session; alarm on and all needs within reach.   OT Discharge Planning   OT Plan LB dsg with AE, ADL tolerance/endurance, progress tfs, UE strengthening   OT Discharge Recommendation (DC Rec) Transitional Care Facility   OT Rationale for DC Rec Pt's ability to complete ADLs, IADLs and mobility are below baseline functioning d/t decrease strength and endurance. pt requiring sonsistent modA for all tfs and rest breaks between activities. Pt lives alone at an ILF and completes all ADLs with Rommel at baseline. Pt's dtr visits often but are not able to consistenly assist Ptat home.  Recommended for PT to continue progressing with IP OT and then benefit from skilled OT at TCU to address deficits so ensure Pt will be safe to return home after d/c. Pt is in agreement with plan.   OT Brief overview of current status modA all tfs, maxA for LB dsg   Total Session Time   Timed Code Treatment Minutes 25   Total Session Time (sum of timed and untimed services) 35

## 2023-08-10 NOTE — PROGRESS NOTES
[facility]    Medicine Progress Note - Hospitalist Service    Date of Admission:  8/6/2023    Assessment & Plan     Floridalma Cunningham is an 88 year old female with history of hypertension, hyperlipidemia, arthritis, and chronic kidney disease who presented to the ED on 8/6/23 with confusion and abdominal pain.ED evaluation showed tachycardia but otherwise unremarkable vital signs. Laboratory evaluation showed BUN 79.1, creatinine 0.77, bicarb 20, anion gap 17, lactic acid 3.8 (2.5 on repeat after IVF), troponin 41 (57 and 65 on repeat), WBC 20.1, hemoglobin 9.1 (7.0 on repeat), procalcitonin 0.15, and INR 1.29. Stool was guaiac positive and UA was abnormal with 11 WBC, positive nitrite, small leukocyte esterase, and few bacteria. She was admitted to the hospital with suspected GI bleed, possible UTI, and encephalopathy. She was treated with IV Protonix drip for GI bleed and Zosyn for possible UTI or other infection. She was seen by GI and had EGD which showed hiatal hernia with Mau's erosions and stigmata of recent bleeding. She required transfusion of one unit of RBCs. Mental status improved. Urine culture ultimately grew E. Coli.     Problem list:    Upper GI bleed due to hiatal hernia, Mau ulcers seen on EGD on 8/7/2023.  Acute blood loss anemia  S/p transfusion of one unit of RBCs    -Hemoglobin now stable, had melanotic stool last night which is probably due to blood that was intestinal and this came out last night.  -Of note, patient had colonoscopy on 10/30/21 for lower GI bleed that showed diverticulosis    E. Coli UTI  Sepsis (lactic acidosis, leukocytosis, and tachycardia), improved  -Changed from Zosyn to Rocephin  -Blood cultures negative, urine culture showed 50 200,000 CFU of E. coli.    Septic encephalopathy due UTI  Metabolic encephalopathy due to GI bleed, lactic acidosis, uremia, etc  -Encephalopathy now resolved.   -MRI of the brain on 8/8 did not show any acute  "intracranial process, no findings to suggest recent infarct but had chronic small vessel ischemic changes.  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    Urine retention  -Possibly due to UTI  -Jackson placed 8/7, now resolved.     Abdominal pain, resolved  -Likely related to UTI or urine retention     Hypernatremia  -Improved, mild  -Stop IVF, encourage water  -AM BMP    Hypertension  -Held PTA lisinopril with resolving sepsis and GI bleed, will likely resume on discharge    Troponin elevation  -Likely due to demand ischemia from the above related issues including sepsis and GI bleeding.    -Will obtain echo to ensure no WMA (normal echo on 8/3/21)    Dyslipidemia  -Continue PTA Lipitor    CKD by chart reviewed  -Renal function seems normal aside from elevated BUN which is more likely related to GI bleed    Disposition  -Improving  -TCU recommended by PT.       Diet: Regular Diet Adult    DVT Prophylaxis: Pneumatic Compression Devices  Jackson Catheter: Not present  Lines: PRESENT             Cardiac Monitoring: None  Code Status: No CPR- Do NOT Intubate      Clinically Significant Risk Factors        # Hypokalemia: Lowest K = 3.2 mmol/L in last 2 days, will replace as needed       # Hypoalbuminemia: Lowest albumin = 3.3 g/dL at 8/7/2023  5:26 PM, will monitor as appropriate         # Hypertension: Noted on problem list        # Overweight: Estimated body mass index is 28.87 kg/m  as calculated from the following:    Height as of this encounter: 1.581 m (5' 2.24\").    Weight as of this encounter: 72.2 kg (159 lb 1.6 oz).  , PRESENT ON ADMISSION          Disposition Plan      Expected Discharge Date: 08/11/2023      Destination: inpatient rehabilitation facility            Lauri Moscoso MD  Hospitalist Service  [facility]  Securely message with [facility] (more " info)  Text page via [facility] Paging/Directory   ______________________________________________________________________    Interval History   Feeling ok today. Mental status is much better today.  Had melanotic stool last night.    Physical Exam   Vital Signs: Temp: 97.4  F (36.3  C) Temp src: Oral BP: (!) 148/81 Pulse: 78   Resp: 18 SpO2: 97 % O2 Device: None (Room air)    Weight: 159 lbs 1.6 oz    GENERAL:  Comfortable. Cooperative.  PSYCH: pleasant, oriented, No acute distress.  EYES: PERRLA, Normal conjunctiva.  HEART:  Regular rate and rhythm. No JVD. Pulses normal. No edema.  LUNGS:  Clear to auscultation, normal Respiratory effort.  ABDOMEN:  Soft, no hepatosplenomegaly, normal bowel sounds.  EXTREMETIES: No clubbing, cyanosis or ischemia  SKIN:  Dry to touch, No rash.      Medical Decision Making       60 MINUTES SPENT BY ME on the date of service doing chart review, history, exam, documentation & further activities per the note.      Data     I have personally reviewed the following data over the past 24 hrs:    9.3  \   8.2 (L)   / 144 (L)     141 109 (H) 8.5 /  111 (H)   3.9 24 0.58 \     Procal: N/A CRP: N/A Lactic Acid: 1.6         Imaging results reviewed over the past 24 hrs:   No results found for this or any previous visit (from the past 24 hour(s)).  Recent Labs   Lab 08/10/23  0620 08/09/23  2104 08/09/23  1242 08/09/23  0559 08/09/23  0158 08/08/23  2357 08/08/23  1714 08/08/23  1158 08/08/23  0603 08/07/23  1727 08/07/23  1726 08/07/23  0555 08/07/23  0004   WBC 9.3  --   --  8.2  --   --   --   --  12.4*  --   --   --  20.1*   HGB 8.2*  --  9.0* 7.8*  --    < > 7.8*   < > 8.5*   < >  --    < > 9.1*   MCV 95  --   --  94  --   --   --   --  92  --   --   --  92   *  --   --  142*  --   --   --   --  160  --   --   --  232   INR  --   --   --   --   --   --   --   --   --   --   --   --  1.29*     --   --  143  --   --  143  --  146*  --  147*  --  140   POTASSIUM 3.9 3.9 3.4 3.2*  --    --   --   --  3.7   < > 3.2*  --  4.3   CHLORIDE 109*  --   --  111*  --   --   --   --  116*  --  115*  --  103   CO2 24  --   --  24  --   --   --   --  20*  --  21*  --  20*   BUN 8.5  --   --  12.9  --   --   --   --  31.5*  --  50.2*  --  79.1*   CR 0.58  --   --  0.62  --   --   --   --  0.71  --  0.68  --  0.77   ANIONGAP 8  --   --  8  --   --   --   --  10  --  11  --  17*   VLADIMIR 8.5*  --   --  8.0*  --   --   --   --  8.3*  --  8.5*  --  9.8   *  --   --  98 107*  --   --   --  106*   < > 82   < > 108*   ALBUMIN  --   --   --   --   --   --   --   --   --   --  3.3*  --  3.4*   PROTTOTAL  --   --   --   --   --   --   --   --   --   --  5.1*  --  6.1*   BILITOTAL  --   --   --   --   --   --   --   --   --   --  0.4  --  0.5   ALKPHOS  --   --   --   --   --   --   --   --   --   --  68  --  83   ALT  --   --   --   --   --   --   --   --   --   --  25  --  24   AST  --   --   --   --   --   --   --   --   --   --  35  --  26   LIPASE  --   --   --   --   --   --   --   --   --   --   --   --  24    < > = values in this interval not displayed.

## 2023-08-10 NOTE — PROGRESS NOTES
Care Management Follow Up    Length of Stay (days): 3    Expected Discharge Date: 08/11/2023     Concerns to be Addressed: discharge planning     Patient plan of care discussed at interdisciplinary rounds: Yes    Anticipated Discharge Disposition: Skilled Nursing Facility, Transitional Care     Anticipated Discharge Services: None  Anticipated Discharge DME: Tom    Patient/family educated on Medicare website which has current facility and service quality ratings: yes  Education Provided on the Discharge Plan: Yes  Patient/Family in Agreement with the Plan: yes    Referrals Placed by CM/SW: Post Acute Facilities  Private pay costs discussed: Not applicable    Additional Information:  Met with patient and daughter at the bedside. Confirmed with them that [facility] has accepted patient for TCU. Patient very happy that she can return there as she had a wonderful experience there when she had her knee done.  [facility] can accept patient anytime after 1100 and time to review orders. Family will plan on transport between 12 and 1300 tomorrow if patient is ready for discharge. MD aware of discharge plan and need for orders for around 1000.    Julianne Parrish RN BSN OCN  Care Coordinator  [facility]  [phone]

## 2023-08-11 VITALS
OXYGEN SATURATION: 97 % | RESPIRATION RATE: 18 BRPM | SYSTOLIC BLOOD PRESSURE: 163 MMHG | HEIGHT: 62 IN | BODY MASS INDEX: 29.22 KG/M2 | WEIGHT: 158.8 LBS | DIASTOLIC BLOOD PRESSURE: 85 MMHG | TEMPERATURE: 98.2 F | HEART RATE: 82 BPM

## 2023-08-11 LAB
ANION GAP SERPL CALCULATED.3IONS-SCNC: 6 MMOL/L (ref 7–15)
BASOPHILS # BLD AUTO: 0 10E3/UL (ref 0–0.2)
BASOPHILS NFR BLD AUTO: 0 %
BUN SERPL-MCNC: 9.2 MG/DL (ref 8–23)
CALCIUM SERPL-MCNC: 8.4 MG/DL (ref 8.8–10.2)
CHLORIDE SERPL-SCNC: 109 MMOL/L (ref 98–107)
CREAT SERPL-MCNC: 0.6 MG/DL (ref 0.51–0.95)
DEPRECATED HCO3 PLAS-SCNC: 26 MMOL/L (ref 22–29)
EOSINOPHIL # BLD AUTO: 0.3 10E3/UL (ref 0–0.7)
EOSINOPHIL NFR BLD AUTO: 4 %
ERYTHROCYTE [DISTWIDTH] IN BLOOD BY AUTOMATED COUNT: 16 % (ref 10–15)
GFR SERPL CREATININE-BSD FRML MDRD: 86 ML/MIN/1.73M2
GLUCOSE SERPL-MCNC: 107 MG/DL (ref 70–99)
HCT VFR BLD AUTO: 26.1 % (ref 35–47)
HGB BLD-MCNC: 8.1 G/DL (ref 11.7–15.7)
IMM GRANULOCYTES # BLD: 0 10E3/UL
IMM GRANULOCYTES NFR BLD: 1 %
LYMPHOCYTES # BLD AUTO: 1.6 10E3/UL (ref 0.8–5.3)
LYMPHOCYTES NFR BLD AUTO: 18 %
MAGNESIUM SERPL-MCNC: 1.9 MG/DL (ref 1.7–2.3)
MCH RBC QN AUTO: 29.2 PG (ref 26.5–33)
MCHC RBC AUTO-ENTMCNC: 31 G/DL (ref 31.5–36.5)
MCV RBC AUTO: 94 FL (ref 78–100)
MONOCYTES # BLD AUTO: 0.7 10E3/UL (ref 0–1.3)
MONOCYTES NFR BLD AUTO: 8 %
NEUTROPHILS # BLD AUTO: 6 10E3/UL (ref 1.6–8.3)
NEUTROPHILS NFR BLD AUTO: 69 %
NRBC # BLD AUTO: 0 10E3/UL
NRBC BLD AUTO-RTO: 0 /100
PLATELET # BLD AUTO: 145 10E3/UL (ref 150–450)
POTASSIUM SERPL-SCNC: 4.4 MMOL/L (ref 3.4–5.3)
RBC # BLD AUTO: 2.77 10E6/UL (ref 3.8–5.2)
SODIUM SERPL-SCNC: 141 MMOL/L (ref 136–145)
WBC # BLD AUTO: 8.7 10E3/UL (ref 4–11)

## 2023-08-11 PROCEDURE — 80048 BASIC METABOLIC PNL TOTAL CA: CPT | Performed by: STUDENT IN AN ORGANIZED HEALTH CARE EDUCATION/TRAINING PROGRAM

## 2023-08-11 PROCEDURE — 250N000013 HC RX MED GY IP 250 OP 250 PS 637: Performed by: INTERNAL MEDICINE

## 2023-08-11 PROCEDURE — 36415 COLL VENOUS BLD VENIPUNCTURE: CPT | Performed by: STUDENT IN AN ORGANIZED HEALTH CARE EDUCATION/TRAINING PROGRAM

## 2023-08-11 PROCEDURE — 85025 COMPLETE CBC W/AUTO DIFF WBC: CPT | Performed by: STUDENT IN AN ORGANIZED HEALTH CARE EDUCATION/TRAINING PROGRAM

## 2023-08-11 PROCEDURE — 83735 ASSAY OF MAGNESIUM: CPT | Performed by: STUDENT IN AN ORGANIZED HEALTH CARE EDUCATION/TRAINING PROGRAM

## 2023-08-11 RX ORDER — PANTOPRAZOLE SODIUM 40 MG/1
TABLET, DELAYED RELEASE ORAL
DISCHARGE
Start: 2023-08-11

## 2023-08-11 RX ORDER — POLYETHYLENE GLYCOL 3350 17 G/17G
17 POWDER, FOR SOLUTION ORAL DAILY PRN
Qty: 510 G | DISCHARGE
Start: 2023-08-11

## 2023-08-11 RX ADMIN — ATORVASTATIN CALCIUM 10 MG: 10 TABLET, FILM COATED ORAL at 07:56

## 2023-08-11 RX ADMIN — PANTOPRAZOLE SODIUM 40 MG: 40 TABLET, DELAYED RELEASE ORAL at 07:57

## 2023-08-11 ASSESSMENT — ACTIVITIES OF DAILY LIVING (ADL)
ADLS_ACUITY_SCORE: 32
ADLS_ACUITY_SCORE: 33
ADLS_ACUITY_SCORE: 32
ADLS_ACUITY_SCORE: 33
ADLS_ACUITY_SCORE: 33
ADLS_ACUITY_SCORE: 32
ADLS_ACUITY_SCORE: 32

## 2023-08-11 NOTE — PLAN OF CARE
Goal Outcome Evaluation:      Plan of Care Reviewed With: patient      End of Shift Summary  For vital signs and complete assessments, please see documentation flowsheets.     Pertinent assessments: Pt A&O,forgetful, Ls clear, on RA, denies pain and nausea, Ambulating to bathroom, voiding without difficulty. BS audible, on regular diet,  no replacement on protocol.      Major Shift Events: discharge     Treatment Plan: IV Rocephin, continue to monitor urine output, K+/Mg protocol, monitor Hgb, discharge to TCU 8/11

## 2023-08-11 NOTE — PROGRESS NOTES
End of Shift Summary  For vital signs and complete assessments, please see documentation flowsheets.     Pertinent assessments: Pt A&O,forgetful, denies any pain, BP's elevated, HR tachy, on RA. Ambulating to bathroom, voiding without difficulty. BS hypo, soft, black/green BM x 1     Major Shift Events: uneventful     Treatment Plan: IV Rocephin, continue to monitor urine output, K+/Mg protocol, monitor Hgb, discharge to TCU 8/11

## 2023-08-11 NOTE — PROGRESS NOTES
Care Management Discharge Note    Discharge Date: 08/11/2023       Discharge Disposition: Skilled Nursing Facility, Transitional Care    Discharge Services: None    Discharge DME: [facility] - uses at baseline    Discharge Transportation: family or friend will provide - pt's dtr    Private pay costs discussed: transportation costs    Does the patient's insurance plan have a 3 day qualifying hospital stay waiver?  Yes   Will the waiver be used for post-acute placement? No    PAS Confirmation Code: [id number]  Patient/family educated on Medicare website which has current facility and service quality ratings: yes    Education Provided on the Discharge Plan: Yes  [facility]  Patient/Family in Agreement with the Plan: yes    Handoff Referral Completed: No    Additional Information:  [facility] family between [id number].  Sw sent the pt's discharge orders to [facility] and confirmed the discharge plan with them, P: [phone].    SAHIL Anderson, [facility]  Inpatient Care Coordination  [facility]  [phone]

## 2023-08-11 NOTE — PLAN OF CARE
Occupational Therapy Discharge Summary    Reason for therapy discharge:    Discharged to transitional care facility.    Progress towards therapy goal(s). See goals on Care Plan in [facility] electronic health record for goal details.  Goals partially met.  Barriers to achieving goals:   discharge from facility.    Therapy recommendation(s):    Continued therapy is recommended.  Rationale/Recommendations:  maximize safety and independence with ADLs.

## 2023-08-11 NOTE — PLAN OF CARE
Discharge Note    Patient discharged to TCU via private vehicle  accompanied by daughter.  IV: Discontinued  Prescriptions e-prescribed to pharmacy.   Belongings reviewed and sent with patient and family.   Home medications returned to patient: NA  Equipment sent with: patient, N/A.   patient and family verbalizes understanding of discharge instructions. AVS given to patient and family.  Additional education completed?  N/A

## 2023-08-11 NOTE — DISCHARGE SUMMARY
"[facility]  Hospitalist Discharge Summary      Date of Admission:  8/6/2023  Date of Discharge:  8/11/2023  1:40 PM  Discharging Provider: Lauri Moscoso MD  Discharge Service: Hospitalist Service    Discharge Diagnoses     Upper GI bleed due to hiatal hernia, Mau ulcers seen on EGD.  E. coli UTI.  Infectious and metabolic encephalopathy due to lactic acidosis, uremia etc.  Urinary retention, needed Jackson but was discontinued on discharge.  Hypertension, per patient she is not on any antihypertensives at home and will defer to primary.  Hyponatremia improved.  Type II MI with demand ischemia.  Hyperlipidemia.      Clinically Significant Risk Factors     # Overweight: Estimated body mass index is 28.82 kg/m  as calculated from the following:    Height as of this encounter: 1.581 m (5' 2.24\").    Weight as of this encounter: 72 kg (158 lb 12.8 oz).       Follow-ups Needed After Discharge   Follow-up Appointments     Follow Up and recommended labs and tests      Follow up with jail physician.  The following labs/tests are   recommended: CBC and BMP in 1 week.            Unresulted Labs Ordered in the Past 30 Days of this Admission       Date and Time Order Name Status Description    8/7/2023  4:38 PM Prepare red blood cells (unit) Preliminary     8/7/2023  2:04 AM Blood Culture Peripheral Blood Preliminary     8/7/2023  2:04 AM Blood Culture Peripheral Blood Preliminary         These results will be followed up by hospitalist team    Discharge Disposition   Discharged to home  Condition at discharge: Stable    Hospital Course   pablo Cunningham is an 88 year old female with history of hypertension, hyperlipidemia, arthritis, and chronic kidney disease who presented to the ED on 8/6/23 with confusion and abdominal pain.ED evaluation showed tachycardia but otherwise unremarkable vital signs. Laboratory evaluation showed BUN 79.1, creatinine 0.77, bicarb 20, anion gap 17, lactic acid 3.8 " (2.5 on repeat after IVF), troponin 41 (57 and 65 on repeat), WBC 20.1, hemoglobin 9.1 (7.0 on repeat), procalcitonin 0.15, and INR 1.29. Stool was guaiac positive and UA was abnormal with 11 WBC, positive nitrite, small leukocyte esterase, and few bacteria. She was admitted to the hospital with suspected GI bleed, possible UTI, and encephalopathy. She was treated with IV Protonix drip for GI bleed and Zosyn for possible UTI or other infection. She was seen by GI and had EGD which showed hiatal hernia with Mau's erosions and stigmata of recent bleeding.  She will be given Protonix 40 mg p.o. twice daily for 4 weeks followed by 40 mg daily indefinitely.  She required transfusion of one unit of RBCs. Mental status improved. Urine culture ultimately grew 50 -100k CFU of E. Coli.             Consultations This Hospital Stay   GASTROENTEROLOGY IP CONSULT  VASCULAR ACCESS ADULT IP CONSULT  PHYSICAL THERAPY ADULT IP CONSULT  OCCUPATIONAL THERAPY ADULT IP CONSULT  VASCULAR ACCESS ADULT IP CONSULT  CARE MANAGEMENT / SOCIAL WORK IP CONSULT  PHYSICAL THERAPY ADULT IP CONSULT    Code Status   No CPR- Do NOT Intubate    Time Spent on this Encounter   I, Lauri Moscoso MD, personally saw the patient today and spent greater than 30 minutes discharging this patient.       Lauri Moscoso MD  [place] [address] [facility]  [address] [place] [address]  Phone: [phone]  Fax: [phone]  ______________________________________________________________________    Physical Exam   Vital Signs: Temp: 98.2  F (36.8  C) Temp src: Oral BP: (!) 176/77 Pulse: 93   Resp: 20 SpO2: 98 % O2 Device: None (Room air)    Weight: 158 lbs 12.8 oz  Alert awake oriented x3.  Chest: Clear to auscultation.  Heart: S1-S2 normal.  Abdomen soft and nontender.  Extremities: No edema.       Primary Care Physician   Physician No Ref-Primary    Discharge Orders      General info for SNF    Length of Stay Estimate: Short Term Care:  Estimated # of Days <30  Condition at Discharge: Improving  Level of care:skilled   Rehabilitation Potential: Good  Admission H&P remains valid and up-to-date: Yes  Recent Chemotherapy: N/A  Use Nursing Home Standing Orders: Yes     Mantoux instructions    Give two-step Mantoux (PPD) Per Facility Policy Yes     Follow Up and recommended labs and tests    Follow up with skilled nursing physician.  The following labs/tests are recommended: CBC and BMP in 1 week.     Reason for your hospital stay    GI bleed due to Mau ulcers.     Activity - Up with nursing assistance     No CPR- Do NOT Intubate     Physical Therapy Adult Consult    Evaluate and treat as clinically indicated.    Reason: Rehab after acute illness     Fall precautions     Diet    Follow this diet upon discharge: Orders Placed This Encounter      Regular Diet Adult       Significant Results and Procedures   Most Recent 3 CBC's:  Recent Labs   Lab Test 08/11/23  0625 08/10/23  0620 08/09/23  1242 08/09/23  0559   WBC 8.7 9.3  --  8.2   HGB 8.1* 8.2* 9.0* 7.8*   MCV 94 95  --  94   * 144*  --  142*     Most Recent 3 BMP's:  Recent Labs   Lab Test 08/11/23  0625 08/10/23  0620 08/09/23  2104 08/09/23  1242 08/09/23  0559    141  --   --  143   POTASSIUM 4.4 3.9 3.9   < > 3.2*   CHLORIDE 109* 109*  --   --  111*   CO2 26 24  --   --  24   BUN 9.2 8.5  --   --  12.9   CR 0.60 0.58  --   --  0.62   ANIONGAP 6* 8  --   --  8   VLADIMIR 8.4* 8.5*  --   --  8.0*   * 111*  --   --  98    < > = values in this interval not displayed.     Most Recent 2 LFT's:  Recent Labs   Lab Test 08/07/23  1726 08/07/23  0004   AST 35 26   ALT 25 24   ALKPHOS 68 83   BILITOTAL 0.4 0.5   ,   Results for orders placed or performed during the hospital encounter of 08/06/23   CT Head w/o Contrast    Narrative    EXAM: CT HEAD W/O CONTRAST, CTA HEAD NECK W CONTRAST  LOCATION: [facility]  DATE/TIME: 8/7/2023 1:21 AM CDT    INDICATION: Code stroke;  altered mental status, nausea and vomiting  COMPARISON: None.  CONTRAST: 75 mL Isovue 370  TECHNIQUE: Head and neck CT angiogram with IV contrast. Noncontrast head CT followed by axial helical CT images of the head and neck vessels obtained during the arterial phase of intravenous contrast administration. Axial 2D reconstructed images and   multiplanar 3D MIP reconstructed images of the head and neck vessels were performed by the technologist. Dose reduction techniques were used. All stenosis measurements made according to NASCET criteria unless otherwise specified.    FINDINGS:   NONCONTRAST HEAD CT:   INTRACRANIAL CONTENTS: No intracranial hemorrhage, extraaxial collection, or mass effect.  No CT evidence of acute infarct. Mild to moderate presumed chronic small vessel ischemic changes. Mild to moderate generalized volume loss. No hydrocephalus.     VISUALIZED ORBITS/SINUSES/MASTOIDS: Prior bilateral cataract surgery. Visualized portions of the orbits are otherwise unremarkable. No significant paranasal sinus mucosal disease. No middle ear or mastoid effusion.    BONES/SOFT TISSUES: No acute abnormality.    HEAD CTA:  Dolichoectatic intracranial vasculature.    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 No occlusion, aneurysm, or high flow vascular malformation. Right PCOM infundibulum. Standard [place] of [place] anatomy.    POSTERIOR CIRCULATION: No stenosis/occlusion, aneurysm, or high flow vascular malformation. Balanced vertebral arteries supply a normal basilar artery.     DURAL VENOUS SINUSES: Expected enhancement of the major dural venous sinuses.    NECK CTA:  RIGHT CAROTID: Calcified atherosclerotic plaque results in less than 25% stenosis in the proximal ICA. No dissection.    LEFT CAROTID: No measurable stenosis or dissection.    VERTEBRAL ARTERIES: No focal stenosis or dissection. Balanced vertebral arteries.    AORTIC ARCH: Bovine origin left common carotid  artery. No significant stenosis at the origin of the great vessels.    NONVASCULAR STRUCTURES: Unremarkable.      Impression    IMPRESSION:   HEAD CT:  1.  No CT evidence for acute intracranial process.  2.  Brain atrophy and presumed chronic microvascular ischemic changes as above.    HEAD CTA:   1.  No large vessel occlusion or significant proximal branch stenosis.    NECK CTA:  1.  No large vessel occlusion or hemodynamically significant stenosis.   CTA Head Neck with Contrast    Narrative    EXAM: CT HEAD W/O CONTRAST, CTA HEAD NECK W CONTRAST  LOCATION: [facility]  DATE/TIME: 8/7/2023 1:21 AM CDT    INDICATION: Code stroke; altered mental status, nausea and vomiting  COMPARISON: None.  CONTRAST: 75 mL Isovue 370  TECHNIQUE: Head and neck CT angiogram with IV contrast. Noncontrast head CT followed by axial helical CT images of the head and neck vessels obtained during the arterial phase of intravenous contrast administration. Axial 2D reconstructed images and   multiplanar 3D MIP reconstructed images of the head and neck vessels were performed by the technologist. Dose reduction techniques were used. All stenosis measurements made according to NASCET criteria unless otherwise specified.    FINDINGS:   NONCONTRAST HEAD CT:   INTRACRANIAL CONTENTS: No intracranial hemorrhage, extraaxial collection, or mass effect.  No CT evidence of acute infarct. Mild to moderate presumed chronic small vessel ischemic changes. Mild to moderate generalized volume loss. No hydrocephalus.     VISUALIZED ORBITS/SINUSES/MASTOIDS: Prior bilateral cataract surgery. Visualized portions of the orbits are otherwise unremarkable. No significant paranasal sinus mucosal disease. No middle ear or mastoid effusion.    BONES/SOFT TISSUES: No acute abnormality.    HEAD CTA:  Dolichoectatic intracranial vasculature.    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 No occlusion,  aneurysm, or high flow vascular malformation. Right PCOM infundibulum. Standard [place] of [place] anatomy.    POSTERIOR CIRCULATION: No stenosis/occlusion, aneurysm, or high flow vascular malformation. Balanced vertebral arteries supply a normal basilar artery.     DURAL VENOUS SINUSES: Expected enhancement of the major dural venous sinuses.    NECK CTA:  RIGHT CAROTID: Calcified atherosclerotic plaque results in less than 25% stenosis in the proximal ICA. No dissection.    LEFT CAROTID: No measurable stenosis or dissection.    VERTEBRAL ARTERIES: No focal stenosis or dissection. Balanced vertebral arteries.    AORTIC ARCH: Bovine origin left common carotid artery. No significant stenosis at the origin of the great vessels.    NONVASCULAR STRUCTURES: Unremarkable.      Impression    IMPRESSION:   HEAD CT:  1.  No CT evidence for acute intracranial process.  2.  Brain atrophy and presumed chronic microvascular ischemic changes as above.    HEAD CTA:   1.  No large vessel occlusion or significant proximal branch stenosis.    NECK CTA:  1.  No large vessel occlusion or hemodynamically significant stenosis.   CT Abdomen Pelvis w Contrast    Narrative    EXAM: CT ABDOMEN PELVIS W CONTRAST  LOCATION: [facility]  DATE: 8/7/2023    INDICATION: abd pain, sepsis  COMPARISON: None.  TECHNIQUE: CT scan of the abdomen and pelvis was performed following injection of IV contrast. Multiplanar reformats were obtained. Dose reduction techniques were used.  CONTRAST: 75 mL Isovue 370    FINDINGS:   LOWER CHEST: Large hiatal hernia. Respiratory motion obscures details. Right hemidiaphragm is not well seen secondary to respiratory motion. There is interval mild to moderate elevation of the right hemidiaphragm    HEPATOBILIARY: Calcified gallstones in the gallbladder. Fatty infiltration the liver. Stable low-attenuation foci in the liver most consistent with cysts. No follow-up needed.    PANCREAS: No significant  mass, duct dilatation, or inflammatory change.    SPLEEN: Normal.    ADRENAL GLANDS: Normal.    KIDNEYS/BLADDER: Right renal cyst. No follow-up needed. No significant mass, stone, or hydronephrosis.    BOWEL: Respiratory motion obscures details. Nonspecific nonobstructive bowel gas pattern. Multiple colonic diverticula, greatest in the sigmoid colon. No definite CT evidence for diverticulitis but respiratory motion obscures details. Appendix not   visualized    LYMPH NODES: No lymphadenopathy.    VASCULATURE: No abdominal aortic aneurysm. Moderate to advanced aortoiliac vascular calcifications    PELVIC ORGANS: No pelvic masses.    MUSCULOSKELETAL: Moderate thoracic and lumbar spondylosis.      Impression    IMPRESSION:   1.  Respiratory motion obscures details. Nonspecific nonobstructive bowel gas pattern. Multiple colonic diverticula, greatest in the sigmoid colon. No definite CT evidence for diverticulitis but respiratory motion obscures details. Appendix not   visualized    2.  Calcified gallstones in the gallbladder.     3.  Fatty infiltration the liver.     4.  Stable low-attenuation foci in the liver most consistent with cysts. No follow-up needed.    5.  No abdominal aortic aneurysm. Moderate to advanced aortoiliac vascular calcifications    6.  Right renal cyst. No follow-up needed. No significant mass, stone, or hydronephrosis.   MR Brain w/o & w Contrast    Narrative    MRI BRAIN WITHOUT AND WITH CONTRAST  8/8/2023 3:33 PM     HISTORY:  Confusion. Evaluate for stroke.    TECHNIQUE:  Multiplanar, multisequence MRI of the brain without and  with 7.5 mL Gadavist.     COMPARISON: CT of the head dated 8/6/2023.    FINDINGS: No abnormal intracranial restricted diffusion is identified  to suggest recent infarct. The ventricles appear normal in size and  configuration. There is mild to moderate generalized brain parenchymal  volume loss. Mild scattered patchy nonspecific T2 FLAIR hyperintense  signal  abnormalities of the cerebral white matter, likely due to  chronic small vessel ischemic disease. No intracranial hemorrhage,  extra-axial fluid collection, or mass effect.    Abnormal appearance of the cavernous right internal carotid artery  flow void (series 5 image 12) which corresponds to previously  demonstrated predominantly fusiform-appearing segmental dilation of  the cavernous/paraclinoid segment of the right internal carotid  artery. Correlating with the prior CTA, the diameter of the cavernous  segment of the right internal carotid artery was approximately 10-11  mm. Otherwise, the major arterial flow voids of the skull base are  grossly maintained and they appear grossly unremarkable. Bilateral  lens implants. Minimal mucosal thickening in the ethmoid sinuses.  Small amount of fluid/membrane thickening in the bilateral mastoid air  cells. Hyperostosis frontalis interna. The calvarium, skull base, and  midface otherwise appear unremarkable.      Impression    IMPRESSION:  1. No acute intracranial process is identified. Specifically, no  findings to suggest recent infarct.  2. Brain atrophy and presumed chronic small vessel ischemic changes,  as described.  3. 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    MIKE REN MD         SYSTEM ID:  [id number]   Echocardiogram Limited     Value    LVEF  60-65%    Narrative    [id number]  [id number]  [id number]  [id number]^KATHERIN^LEYDA^SANDRITA     [facility]  Echocardiography Laboratory  [address] [place] [address]     Name: YUNG ORO  MRN: [id number]  : 1935  Study Date: 2023 01:41 PM  Age: 88 yrs  Gender: Female  Patient Location: [facility]  Reason For Study: Other, Please Specify in Comments  Ordering Physician: LEYDA PANDEY  Referring Physician: No Primary  Performed By: Bhavesh  LARA Parker     BSA: 1.8 m2  Height: 63 in  Weight: 159 lb  HR: 87  BP: 121/71 mmHg  ______________________________________________________________________________  Procedure  Limited Portable Echo Adult. Optison (NDC #[id number]) given intravenously.  ______________________________________________________________________________  Interpretation Summary     The patient exhibited frequent PVCs.  Left ventricular systolic function is normal.  The visual ejection fraction is 60-65%.  The left ventricle is normal in size.  There is mild concentric left ventricular hypertrophy.  The right ventricle is normal in structure, function and size.  The left atrium is mildly dilated.  There is mild (1+) mitral regurgitation.  Right ventricular systolic pressure is elevated, consistent with mild  pulmonary hypertension.     No change since 8/3/2021  ______________________________________________________________________________  Left Ventricle  The left ventricle is normal in size. There is mild concentric left  ventricular hypertrophy. Left ventricular systolic function is normal. The  visual ejection fraction is 60-65%. Grade I or early diastolic dysfunction. No  regional wall motion abnormalities noted. There is no thrombus seen in the  left ventricle.     Right Ventricle  The right ventricle is normal in structure, function and size. There is no  mass or thrombus in the right ventricle.     Atria  The left atrium is mildly dilated. There is no atrial shunt seen. No thrombus  is detected in the left atrial appendage.     Mitral Valve  The mitral valve leaflets are mildly thickened. There is mild to moderate  mitral annular calcification. There is mild (1+) mitral regurgitation. There  is no mitral valve stenosis.     Tricuspid Valve  Normal tricuspid valve. The right ventricular systolic pressure is  approximated at 32.3 mmHg plus the right atrial pressure. Right ventricular  systolic pressure is elevated, consistent with mild  pulmonary hypertension.  There is no tricuspid stenosis.     Aortic Valve  The aortic valve is trileaflet. No aortic regurgitation is present. No aortic  stenosis is present.     Pulmonic Valve  The pulmonic valve is not well seen, but is grossly normal.     Vessels  The aortic root is normal size. Normal size ascending aorta. The inferior vena  cava is normal. The pulmonary artery is normal size.     Pericardium  The pericardium appears normal. There is no pleural effusion.     Rhythm  The patient exhibited frequent PVCs.  ______________________________________________________________________________  MMode/2D Measurements & Calculations  IVC diam: 2.2 cm     asc Aorta Diam: 4.2 cm  TAPSE: 1.6 cm     Doppler Measurements & Calculations  MV E max kenton: 111.0 cm/sec  MV A max kenton: 130.0 cm/sec  MV E/A: 0.85  MV dec slope: 536.0 cm/sec2  MV dec time: 0.21 sec  TR max kenton: 284.0 cm/sec  TR max P.3 mmHg  E/E' av.4  Lateral E/e': 13.4  Medial E/e': 11.5  RV S Kenton: 14.4 cm/sec     ______________________________________________________________________________  Report approved by: Dr. Bigg Nye 2023 02:45 PM             Discharge Medications   Current Discharge Medication List        START taking these medications    Details   pantoprazole (PROTONIX) 40 MG EC tablet 40 mg p.o. twice daily for 4 weeks followed by 40 mg daily indefinitely.    Associated Diagnoses: Acute upper GI bleed      polyethylene glycol (MIRALAX) 17 GM/Dose powder Take 17 g by mouth daily as needed  Qty: 510 g    Associated Diagnoses: Acute upper GI bleed           CONTINUE these medications which have NOT CHANGED    Details   bismuth subsalicylate (PEPTO BISMOL) 262 MG chewable tablet Take 1-2 tablets every 4 hours as needed for heartburn    Comments: Med rec per Dr. Whitten 21      calcium carbonate (TUMS) 500 MG chewable tablet Take 1 chew tab by mouth 2 times daily as needed for heartburn      CRANBERRY PO Take 1 tablet by  mouth daily      gabapentin (NEURONTIN) 100 MG capsule Take 200 mg by mouth At Bedtime      multivitamin w/minerals (THERA-VIT-M) tablet Take 1 tablet by mouth daily      Omega-3 Fatty Acids (FISH OIL PO) Take 1,000 mg by mouth daily       atorvastatin (LIPITOR) 10 MG tablet TAKE 1 TABLET BY MOUTH EVERY DAY  Qty: 90 tablet, Refills: 2    Associated Diagnoses: Hyperlipidemia LDL goal <130           Allergies   Allergies   Allergen Reactions    Sulfa Antibiotics

## 2023-08-11 NOTE — PLAN OF CARE
Physical Therapy Discharge Summary    Reason for therapy discharge:    Discharged to transitional care facility.    Progress towards therapy goal(s). See goals on Care Plan in [facility] electronic health record for goal details.  Goals not met.  Barriers to achieving goals:   discharge from facility.    Therapy recommendation(s):    Continued therapy is recommended.  Rationale/Recommendations:  Recommend continued therapies at TCU to progress mobility and strength..

## 2023-08-12 ENCOUNTER — PATIENT OUTREACH (OUTPATIENT)
Dept: CARE COORDINATION | Facility: CLINIC | Age: 88
End: 2023-08-12
Payer: COMMERCIAL

## 2023-08-12 LAB
BACTERIA BLD CULT: NO GROWTH
BACTERIA BLD CULT: NO GROWTH

## 2023-08-12 NOTE — PROGRESS NOTES
[facility]    Background: Transitional Care Management program identified per system criteria and reviewed by [facility] team for possible outreach.    Assessment: Upon chart review, CCRC Team member will not proceed with patient outreach related to this episode of Transitional Care Management program due to reason below:    Non-[facility] TCU: CCRC team member noted patient discharged to TCU/ARU/LTACH. Patient is not established with a [facility] currently supported by Primary Care-Care Coordination therefore handoff to Primary Care-Care Coordination is not appropriate at this time.    Plan: Transitional Care Management episode addressed appropriately per reason noted above.      Sirisha Slaughter RN  [facility], [facility]    *Connected Care Resource Team does NOT follow patient ongoing. Referrals are identified based on internal discharge reports and the outreach is to ensure patient has an understanding of their discharge instructions.

## 2023-10-20 ENCOUNTER — TELEPHONE (OUTPATIENT)
Dept: FAMILY MEDICINE | Facility: CLINIC | Age: 88
End: 2023-10-20
Payer: COMMERCIAL

## 2023-10-20 NOTE — TELEPHONE ENCOUNTER
Summary:    Patient is due/failing the following:   PHQ9    Reviewed:    [] CARE EVERYWHERE  [] LAST OV NOTE   [] FYI TAB  [] MYCHART ACTIVE?  [] LAST PANEL ENCOUNTER  [] FUTURE APPTS  [] IMMUNIZATIONS  [] Media Tab            Action needed:   Patient needs to do PHQ9.    Type of outreach:    Phone, left message for patient to call back.                                                                                Chantelle Ambrocio/[facility]---[facility]

## (undated) DEVICE — DRSG KERLIX FLUFFS X5

## (undated) DEVICE — ESU GROUND PAD UNIVERSAL W/O CORD

## (undated) DEVICE — GLOVE PROTEXIS POWDER FREE 8.5 ORTHOPEDIC 2D73ET85

## (undated) DEVICE — SU ETHIBOND 0 CTX CR  8X18" CX31D

## (undated) DEVICE — BONE CEMENT MIXEVAC III HI VAC KIT  0206-015-000

## (undated) DEVICE — TUBING SUCTION 6"X3/16" N56A

## (undated) DEVICE — PAD CHUX UNDERPAD 30X36" P3036C

## (undated) DEVICE — DRAIN ROUND W/RESERV KIT JACKSON PRATT 10FR 400ML SU130-402D

## (undated) DEVICE — NDL 18GA 1.5" 305196

## (undated) DEVICE — SU ETHIBOND 1 CTX 30" X865H

## (undated) DEVICE — SYR 30ML LL W/O NDL 302832

## (undated) DEVICE — PACK TOTAL KNEE SOP15TKFSD

## (undated) DEVICE — BLADE SAW SAGITTAL STRK 18X90X1.27MM HD SYS 6 6118-127-090

## (undated) DEVICE — DRSG ADAPTIC 3X8" 6113

## (undated) DEVICE — GLOVE PROTEXIS BLUE W/NEU-THERA 8.5  2D73EB85

## (undated) DEVICE — GLOVE PROTEXIS POWDER FREE 8.0 ORTHOPEDIC 2D73ET80

## (undated) DEVICE — GLOVE PROTEXIS W/NEU-THERA 8.5  2D73TE85

## (undated) DEVICE — SUCTION IRR SYSTEM W/O TIP INTERPULSE HANDPIECE 0210-100-000

## (undated) DEVICE — KIT PROCEDURE W/CLEAN-A-SCOPE LINERS V2 200800

## (undated) DEVICE — SOL WATER IRRIG 1000ML BOTTLE 2F7114

## (undated) DEVICE — STPL SKIN 35W 6.9MM  PXW35

## (undated) DEVICE — MANIFOLD NEPTUNE 4 PORT 700-20

## (undated) DEVICE — SU VICRYL 0 CP-1 27" J467H

## (undated) DEVICE — PREP CHLORAPREP 26ML TINTED ORANGE  260815

## (undated) DEVICE — LINEN TOWEL PACK X5 5464

## (undated) DEVICE — SYR 10ML FINGER CONTROL W/O NDL 309695

## (undated) DEVICE — GLOVE PROTEXIS W/NEU-THERA 8.0  2D73TE80

## (undated) DEVICE — SOL NACL 0.9% IRRIG 1000ML BOTTLE 2F7124

## (undated) DEVICE — BLADE SAW SAGITTAL STRK 18X90X1.37MM HD SYS 6 6118-137-090

## (undated) DEVICE — SOL NACL 0.9% INJ 1000ML BAG 2B1324X

## (undated) DEVICE — SU VICRYL 2-0 CP-1 27" UND J266H

## (undated) DEVICE — BONE CLEANING TIP INTERPULSE  0210-010-000

## (undated) DEVICE — SUCTION MANIFOLD NEPTUNE 2 SYS 4 PORT 0702-020-000

## (undated) DEVICE — NDL 22GA 1.5"

## (undated) RX ORDER — FENTANYL CITRATE 50 UG/ML
INJECTION, SOLUTION INTRAMUSCULAR; INTRAVENOUS
Status: DISPENSED
Start: 2021-10-28

## (undated) RX ORDER — LIDOCAINE HYDROCHLORIDE 10 MG/ML
INJECTION, SOLUTION INFILTRATION; PERINEURAL
Status: DISPENSED
Start: 2021-11-02

## (undated) RX ORDER — HEPARIN SODIUM 200 [USP'U]/100ML
INJECTION, SOLUTION INTRAVENOUS
Status: DISPENSED
Start: 2021-11-02

## (undated) RX ORDER — FENTANYL CITRATE 50 UG/ML
INJECTION, SOLUTION INTRAMUSCULAR; INTRAVENOUS
Status: DISPENSED
Start: 2021-11-02

## (undated) RX ORDER — PROPOFOL 10 MG/ML
INJECTION, EMULSION INTRAVENOUS
Status: DISPENSED
Start: 2021-08-11

## (undated) RX ORDER — CEFAZOLIN SODIUM 2 G/100ML
INJECTION, SOLUTION INTRAVENOUS
Status: DISPENSED
Start: 2021-08-11

## (undated) RX ORDER — VANCOMYCIN HYDROCHLORIDE 1 G/20ML
INJECTION, POWDER, LYOPHILIZED, FOR SOLUTION INTRAVENOUS
Status: DISPENSED
Start: 2021-08-11

## (undated) RX ORDER — EPINEPHRINE IN SOD CHLOR,ISO 1 MG/10 ML
SYRINGE (ML) INTRAVENOUS
Status: DISPENSED
Start: 2021-10-30

## (undated) RX ORDER — LIDOCAINE HYDROCHLORIDE 20 MG/ML
INJECTION, SOLUTION EPIDURAL; INFILTRATION; INTRACAUDAL; PERINEURAL
Status: DISPENSED
Start: 2021-08-11

## (undated) RX ORDER — ONDANSETRON 2 MG/ML
INJECTION INTRAMUSCULAR; INTRAVENOUS
Status: DISPENSED
Start: 2021-10-30

## (undated) RX ORDER — NITROGLYCERIN 5 MG/ML
VIAL (ML) INTRAVENOUS
Status: DISPENSED
Start: 2021-11-02

## (undated) RX ORDER — ONDANSETRON 2 MG/ML
INJECTION INTRAMUSCULAR; INTRAVENOUS
Status: DISPENSED
Start: 2021-08-11

## (undated) RX ORDER — FENTANYL CITRATE 50 UG/ML
INJECTION, SOLUTION INTRAMUSCULAR; INTRAVENOUS
Status: DISPENSED
Start: 2021-08-11

## (undated) RX ORDER — EPINEPHRINE IN SOD CHLOR,ISO 1 MG/10 ML
SYRINGE (ML) INTRAVENOUS
Status: DISPENSED
Start: 2021-10-28

## (undated) RX ORDER — FENTANYL CITRATE 50 UG/ML
INJECTION, SOLUTION INTRAMUSCULAR; INTRAVENOUS
Status: DISPENSED
Start: 2021-10-30

## (undated) RX ORDER — DEXAMETHASONE SODIUM PHOSPHATE 4 MG/ML
INJECTION, SOLUTION INTRA-ARTICULAR; INTRALESIONAL; INTRAMUSCULAR; INTRAVENOUS; SOFT TISSUE
Status: DISPENSED
Start: 2021-08-11

## (undated) RX ORDER — TRANEXAMIC ACID 650 MG/1
TABLET ORAL
Status: DISPENSED
Start: 2021-08-11